# Patient Record
Sex: FEMALE | Race: WHITE | NOT HISPANIC OR LATINO
[De-identification: names, ages, dates, MRNs, and addresses within clinical notes are randomized per-mention and may not be internally consistent; named-entity substitution may affect disease eponyms.]

---

## 2017-04-01 ENCOUNTER — RX RENEWAL (OUTPATIENT)
Age: 27
End: 2017-04-01

## 2017-04-25 ENCOUNTER — RX RENEWAL (OUTPATIENT)
Age: 27
End: 2017-04-25

## 2017-05-22 ENCOUNTER — RX RENEWAL (OUTPATIENT)
Age: 27
End: 2017-05-22

## 2017-05-30 ENCOUNTER — APPOINTMENT (OUTPATIENT)
Dept: OBGYN | Facility: CLINIC | Age: 27
End: 2017-05-30

## 2017-05-30 VITALS
DIASTOLIC BLOOD PRESSURE: 62 MMHG | SYSTOLIC BLOOD PRESSURE: 114 MMHG | HEIGHT: 67 IN | WEIGHT: 267 LBS | BODY MASS INDEX: 41.91 KG/M2

## 2017-05-30 DIAGNOSIS — Z87.39 PERSONAL HISTORY OF OTHER DISEASES OF THE MUSCULOSKELETAL SYSTEM AND CONNECTIVE TISSUE: ICD-10-CM

## 2017-10-10 ENCOUNTER — RX RENEWAL (OUTPATIENT)
Age: 27
End: 2017-10-10

## 2017-12-02 ENCOUNTER — APPOINTMENT (OUTPATIENT)
Dept: OBGYN | Facility: CLINIC | Age: 27
End: 2017-12-02
Payer: MEDICAID

## 2017-12-02 ENCOUNTER — LABORATORY RESULT (OUTPATIENT)
Age: 27
End: 2017-12-02

## 2017-12-02 VITALS
DIASTOLIC BLOOD PRESSURE: 62 MMHG | SYSTOLIC BLOOD PRESSURE: 110 MMHG | BODY MASS INDEX: 43.95 KG/M2 | WEIGHT: 280 LBS | HEIGHT: 67 IN

## 2017-12-02 PROCEDURE — 99395 PREV VISIT EST AGE 18-39: CPT

## 2017-12-09 LAB — CYTOLOGY CVX/VAG DOC THIN PREP: NORMAL

## 2018-05-10 ENCOUNTER — EMERGENCY (EMERGENCY)
Facility: HOSPITAL | Age: 28
LOS: 1 days | Discharge: HOME | End: 2018-05-10
Attending: EMERGENCY MEDICINE
Payer: MEDICAID

## 2018-05-10 VITALS
RESPIRATION RATE: 18 BRPM | SYSTOLIC BLOOD PRESSURE: 133 MMHG | HEART RATE: 94 BPM | TEMPERATURE: 100 F | DIASTOLIC BLOOD PRESSURE: 83 MMHG | OXYGEN SATURATION: 100 %

## 2018-05-10 PROCEDURE — 99283 EMERGENCY DEPT VISIT LOW MDM: CPT

## 2018-05-10 PROCEDURE — 73630 X-RAY EXAM OF FOOT: CPT

## 2018-05-10 PROCEDURE — 73630 X-RAY EXAM OF FOOT: CPT | Mod: 26,RT

## 2018-05-10 RX ORDER — ACETAMINOPHEN 500 MG
975 TABLET ORAL ONCE
Qty: 0 | Refills: 0 | Status: COMPLETED | OUTPATIENT
Start: 2018-05-10 | End: 2018-05-10

## 2018-05-10 RX ORDER — IBUPROFEN 200 MG
600 TABLET ORAL ONCE
Qty: 0 | Refills: 0 | Status: DISCONTINUED | OUTPATIENT
Start: 2018-05-10 | End: 2018-05-10

## 2018-05-10 RX ADMIN — Medication 975 MILLIGRAM(S): at 21:40

## 2018-05-10 NOTE — ED ADULT NURSE NOTE - OBJECTIVE STATEMENT
28 Y female no PMH arrives to ED c/o ankle pain s/p trip and fall two hours prior. Patient reports she was chasing her toddler at her school, states she twisted her ankle during event. Reports she has pain when she dorsi flexes foot, no obvious deformities, bruising, trauma. Patient reports pain is 6/10 worse with movement.

## 2018-05-10 NOTE — ED PROVIDER NOTE - OBJECTIVE STATEMENT
29 y/o female no PMH presents with R foot injury. Was running on solid ground after her son on flat ground and inverted her R foot. Has pain over dorsal surface of her R foot, proximal metatarsals. No pain with ROM of ankle or knee. No f/c, CP, SOB, N/V/D, numbness, weakness. Took 600 motrin w/ mild relief. No allergies. Does take OCPs. 29 y/o female no PMH presents with R foot injury. Was running on solid ground after her son on flat ground and inverted her R foot. Has pain over dorsal surface of her R foot, proximal metatarsals. No pain with ROM of ankle or knee. No f/c, CP, SOB, N/V/D, numbness, weakness. Took 600 motrin w/ mild relief. No allergies. Does take OCPs.      Attending note. Patient was seen in fast track room #6. Agree with the above. Approximately 2 hours ago patient inverted her right ankle and is now complaining of pain in the midfoot. Pain is increased with weightbearing. Patient is any numbness or paresthesia. Patient has some pain in the left hand which has resolved. Patient denies any prior injury to the foot.

## 2018-05-10 NOTE — ED PROVIDER NOTE - PLAN OF CARE
1) Please return to the ED should you have any new or worsening symptoms, worsening pain, develop any concerning symptoms  2) Please follow up with [facility]. Please call [phone] to make an appointment.   3) Please take Ibuprofen (Motrin) 600mg by mouth every 6 hours as needed for pain. Please take this medication with food.

## 2018-05-10 NOTE — ED PROVIDER NOTE - PROGRESS NOTE DETAILS
Fuad Wolfe (Resident): no lisfranc fracture appreciated on XR - no other injuries identified - will wrap in Wagner dressing and d/c w/ podiatry f.u - nursing provided patient crutches and educated on how to use .

## 2018-05-10 NOTE — ED PROVIDER NOTE - MUSCULOSKELETAL, MLM
Strength appropriate for age. Full active range of motion of all 4 extremities. No pain with ROM of R knee or ankle. Tenderness over Lisfranc joint, dorsal surface of R foot. No bruising or open fracture. No tenderness over R lateral and medial malleolus. No pain with active ROM of R ankle. No tenderness over entire tib/fibula on the R. Remainder of extremities WNL.

## 2018-05-10 NOTE — ED PROVIDER NOTE - MEDICAL DECISION MAKING DETAILS
Fuad Wolfe (Resident): 29 y/o female p/w inversion injury to R foot - neurovasc intact, no knee pain or ankle pain w/ good ROM - based on exam, concern for poss Lisfranc vs metatarsal injury - will pain control and XR Fuad Wolfe (Resident): 27 y/o female p/w inversion injury to R foot - neurovasc intact, no knee pain or ankle pain w/ good ROM - based on exam, concern for poss Lisfranc vs metatarsal injury - will pain control and XR      Attending note-right foot injury. Patient has no ankle pain or tenderness. X-ray to rule out fracture. Likely sprain of Lisfranc joints.

## 2018-05-10 NOTE — ED PROVIDER NOTE - CARE PLAN
Principal Discharge DX:	Foot sprain, right, initial encounter Principal Discharge DX:	Foot sprain, right, initial encounter  Assessment and plan of treatment:	1) Please return to the ED should you have any new or worsening symptoms, worsening pain, develop any concerning symptoms  2) Please follow up with [facility]. Please call [phone] to make an appointment.   3) Please take Ibuprofen (Motrin) 600mg by mouth every 6 hours as needed for pain. Please take this medication with food.

## 2018-05-10 NOTE — ED PROVIDER NOTE - PHYSICAL EXAMINATION
Attending note. Patient is alert and in no acute distress. Examination of the right lower extremity reveals no deformity. Proximal leg is nontender. Squeeze test is negative. Malleoli are nontender. There is no tenderness deltoid, ATFL, CFL or Achilles. Patient has tenderness along the Lisfranc joint. There is no swelling of the foot with deformity of the foot. Sensation is intact and normal. Patient has good capillary refill. Skin is normal.

## 2018-06-25 ENCOUNTER — APPOINTMENT (OUTPATIENT)
Dept: OBGYN | Facility: CLINIC | Age: 28
End: 2018-06-25

## 2018-07-16 ENCOUNTER — RX RENEWAL (OUTPATIENT)
Age: 28
End: 2018-07-16

## 2018-07-30 ENCOUNTER — APPOINTMENT (OUTPATIENT)
Dept: OBGYN | Facility: CLINIC | Age: 28
End: 2018-07-30
Payer: MEDICAID

## 2018-07-30 VITALS
SYSTOLIC BLOOD PRESSURE: 102 MMHG | DIASTOLIC BLOOD PRESSURE: 80 MMHG | BODY MASS INDEX: 45.99 KG/M2 | HEIGHT: 67 IN | WEIGHT: 293 LBS

## 2018-07-30 PROCEDURE — 99213 OFFICE O/P EST LOW 20 MIN: CPT

## 2018-09-24 ENCOUNTER — EMERGENCY (EMERGENCY)
Facility: HOSPITAL | Age: 28
LOS: 1 days | Discharge: HOME | End: 2018-09-24
Attending: EMERGENCY MEDICINE
Payer: COMMERCIAL

## 2018-09-24 VITALS
HEIGHT: 70 IN | HEART RATE: 98 BPM | TEMPERATURE: 98 F | WEIGHT: 293 LBS | DIASTOLIC BLOOD PRESSURE: 86 MMHG | RESPIRATION RATE: 20 BRPM | SYSTOLIC BLOOD PRESSURE: 126 MMHG | OXYGEN SATURATION: 100 %

## 2018-09-24 LAB
ALBUMIN SERPL ELPH-MCNC: 4.3 G/DL — SIGNIFICANT CHANGE UP (ref 3.3–5)
ALP SERPL-CCNC: 60 U/L — SIGNIFICANT CHANGE UP (ref 40–120)
ALT FLD-CCNC: 16 U/L — SIGNIFICANT CHANGE UP (ref 10–45)
ANION GAP SERPL CALC-SCNC: 11 MMOL/L — SIGNIFICANT CHANGE UP (ref 5–17)
APPEARANCE UR: CLEAR — SIGNIFICANT CHANGE UP
APTT BLD: 30.6 SEC — SIGNIFICANT CHANGE UP (ref 27.5–37.4)
AST SERPL-CCNC: 14 U/L — SIGNIFICANT CHANGE UP (ref 10–40)
BACTERIA # UR AUTO: NEGATIVE — SIGNIFICANT CHANGE UP
BASOPHILS # BLD AUTO: 0 K/UL — SIGNIFICANT CHANGE UP (ref 0–0.2)
BASOPHILS NFR BLD AUTO: 0.3 % — SIGNIFICANT CHANGE UP (ref 0–2)
BILIRUB SERPL-MCNC: 0.2 MG/DL — SIGNIFICANT CHANGE UP (ref 0.2–1.2)
BILIRUB UR-MCNC: NEGATIVE — SIGNIFICANT CHANGE UP
BUN SERPL-MCNC: 12 MG/DL — SIGNIFICANT CHANGE UP (ref 7–23)
CALCIUM SERPL-MCNC: 9.7 MG/DL — SIGNIFICANT CHANGE UP (ref 8.4–10.5)
CHLORIDE SERPL-SCNC: 103 MMOL/L — SIGNIFICANT CHANGE UP (ref 96–108)
CO2 SERPL-SCNC: 27 MMOL/L — SIGNIFICANT CHANGE UP (ref 22–31)
COLOR SPEC: SIGNIFICANT CHANGE UP
CREAT SERPL-MCNC: 0.64 MG/DL — SIGNIFICANT CHANGE UP (ref 0.5–1.3)
DIFF PNL FLD: NEGATIVE — SIGNIFICANT CHANGE UP
EOSINOPHIL # BLD AUTO: 0.1 K/UL — SIGNIFICANT CHANGE UP (ref 0–0.5)
EOSINOPHIL NFR BLD AUTO: 1.3 % — SIGNIFICANT CHANGE UP (ref 0–6)
EPI CELLS # UR: 1 /HPF — SIGNIFICANT CHANGE UP
GAS PNL BLDV: SIGNIFICANT CHANGE UP
GLUCOSE SERPL-MCNC: 102 MG/DL — HIGH (ref 70–99)
GLUCOSE UR QL: NEGATIVE — SIGNIFICANT CHANGE UP
HCG SERPL-ACNC: <2 MIU/ML — SIGNIFICANT CHANGE UP
HCT VFR BLD CALC: 40.7 % — SIGNIFICANT CHANGE UP (ref 34.5–45)
HGB BLD-MCNC: 13.7 G/DL — SIGNIFICANT CHANGE UP (ref 11.5–15.5)
HYALINE CASTS # UR AUTO: 0 /LPF — SIGNIFICANT CHANGE UP (ref 0–2)
INR BLD: 0.95 RATIO — SIGNIFICANT CHANGE UP (ref 0.88–1.16)
KETONES UR-MCNC: NEGATIVE — SIGNIFICANT CHANGE UP
LEUKOCYTE ESTERASE UR-ACNC: NEGATIVE — SIGNIFICANT CHANGE UP
LIDOCAIN IGE QN: 31 U/L — SIGNIFICANT CHANGE UP (ref 7–60)
LYMPHOCYTES # BLD AUTO: 2.9 K/UL — SIGNIFICANT CHANGE UP (ref 1–3.3)
LYMPHOCYTES # BLD AUTO: 30 % — SIGNIFICANT CHANGE UP (ref 13–44)
MCHC RBC-ENTMCNC: 28.7 PG — SIGNIFICANT CHANGE UP (ref 27–34)
MCHC RBC-ENTMCNC: 33.7 GM/DL — SIGNIFICANT CHANGE UP (ref 32–36)
MCV RBC AUTO: 85.2 FL — SIGNIFICANT CHANGE UP (ref 80–100)
MONOCYTES # BLD AUTO: 0.6 K/UL — SIGNIFICANT CHANGE UP (ref 0–0.9)
MONOCYTES NFR BLD AUTO: 6.6 % — SIGNIFICANT CHANGE UP (ref 2–14)
NEUTROPHILS # BLD AUTO: 6 K/UL — SIGNIFICANT CHANGE UP (ref 1.8–7.4)
NEUTROPHILS NFR BLD AUTO: 61.8 % — SIGNIFICANT CHANGE UP (ref 43–77)
NITRITE UR-MCNC: NEGATIVE — SIGNIFICANT CHANGE UP
PH UR: 6 — SIGNIFICANT CHANGE UP (ref 5–8)
PLATELET # BLD AUTO: 257 K/UL — SIGNIFICANT CHANGE UP (ref 150–400)
POTASSIUM SERPL-MCNC: 3.9 MMOL/L — SIGNIFICANT CHANGE UP (ref 3.5–5.3)
POTASSIUM SERPL-SCNC: 3.9 MMOL/L — SIGNIFICANT CHANGE UP (ref 3.5–5.3)
PROT SERPL-MCNC: 7.3 G/DL — SIGNIFICANT CHANGE UP (ref 6–8.3)
PROT UR-MCNC: NEGATIVE — SIGNIFICANT CHANGE UP
PROTHROM AB SERPL-ACNC: 10.3 SEC — SIGNIFICANT CHANGE UP (ref 9.8–12.7)
RBC # BLD: 4.78 M/UL — SIGNIFICANT CHANGE UP (ref 3.8–5.2)
RBC # FLD: 12.2 % — SIGNIFICANT CHANGE UP (ref 10.3–14.5)
RBC CASTS # UR COMP ASSIST: 2 /HPF — SIGNIFICANT CHANGE UP (ref 0–4)
SODIUM SERPL-SCNC: 141 MMOL/L — SIGNIFICANT CHANGE UP (ref 135–145)
SP GR SPEC: 1.02 — SIGNIFICANT CHANGE UP
UROBILINOGEN FLD QL: NEGATIVE — SIGNIFICANT CHANGE UP
WBC # BLD: 9.7 K/UL — SIGNIFICANT CHANGE UP (ref 3.8–10.5)
WBC # FLD AUTO: 9.7 K/UL — SIGNIFICANT CHANGE UP (ref 3.8–10.5)
WBC UR QL: 1 /HPF — SIGNIFICANT CHANGE UP (ref 0–5)

## 2018-09-24 PROCEDURE — 76830 TRANSVAGINAL US NON-OB: CPT | Mod: 26

## 2018-09-24 PROCEDURE — 93975 VASCULAR STUDY: CPT

## 2018-09-24 PROCEDURE — 85730 THROMBOPLASTIN TIME PARTIAL: CPT

## 2018-09-24 PROCEDURE — 76830 TRANSVAGINAL US NON-OB: CPT

## 2018-09-24 PROCEDURE — 74177 CT ABD & PELVIS W/CONTRAST: CPT

## 2018-09-24 PROCEDURE — 83605 ASSAY OF LACTIC ACID: CPT

## 2018-09-24 PROCEDURE — 82803 BLOOD GASES ANY COMBINATION: CPT

## 2018-09-24 PROCEDURE — 82435 ASSAY OF BLOOD CHLORIDE: CPT

## 2018-09-24 PROCEDURE — 85027 COMPLETE CBC AUTOMATED: CPT

## 2018-09-24 PROCEDURE — 82330 ASSAY OF CALCIUM: CPT

## 2018-09-24 PROCEDURE — 85610 PROTHROMBIN TIME: CPT

## 2018-09-24 PROCEDURE — 80053 COMPREHEN METABOLIC PANEL: CPT

## 2018-09-24 PROCEDURE — 76705 ECHO EXAM OF ABDOMEN: CPT | Mod: 26,RT

## 2018-09-24 PROCEDURE — 83690 ASSAY OF LIPASE: CPT

## 2018-09-24 PROCEDURE — 99284 EMERGENCY DEPT VISIT MOD MDM: CPT

## 2018-09-24 PROCEDURE — 84702 CHORIONIC GONADOTROPIN TEST: CPT

## 2018-09-24 PROCEDURE — 93975 VASCULAR STUDY: CPT | Mod: 26

## 2018-09-24 PROCEDURE — 81001 URINALYSIS AUTO W/SCOPE: CPT

## 2018-09-24 PROCEDURE — 84295 ASSAY OF SERUM SODIUM: CPT

## 2018-09-24 PROCEDURE — 76705 ECHO EXAM OF ABDOMEN: CPT

## 2018-09-24 PROCEDURE — 84132 ASSAY OF SERUM POTASSIUM: CPT

## 2018-09-24 PROCEDURE — 87086 URINE CULTURE/COLONY COUNT: CPT

## 2018-09-24 PROCEDURE — 74177 CT ABD & PELVIS W/CONTRAST: CPT | Mod: 26

## 2018-09-24 PROCEDURE — 85014 HEMATOCRIT: CPT

## 2018-09-24 PROCEDURE — 82947 ASSAY GLUCOSE BLOOD QUANT: CPT

## 2018-09-24 PROCEDURE — 99284 EMERGENCY DEPT VISIT MOD MDM: CPT | Mod: 25

## 2018-09-24 PROCEDURE — 96374 THER/PROPH/DIAG INJ IV PUSH: CPT | Mod: XU

## 2018-09-24 RX ORDER — SODIUM CHLORIDE 9 MG/ML
2000 INJECTION, SOLUTION INTRAVENOUS ONCE
Qty: 0 | Refills: 0 | Status: COMPLETED | OUTPATIENT
Start: 2018-09-24 | End: 2018-09-24

## 2018-09-24 RX ORDER — ONDANSETRON 8 MG/1
4 TABLET, FILM COATED ORAL ONCE
Qty: 0 | Refills: 0 | Status: COMPLETED | OUTPATIENT
Start: 2018-09-24 | End: 2018-09-24

## 2018-09-24 RX ADMIN — SODIUM CHLORIDE 2000 MILLILITER(S): 9 INJECTION, SOLUTION INTRAVENOUS at 20:18

## 2018-09-24 RX ADMIN — ONDANSETRON 4 MILLIGRAM(S): 8 TABLET, FILM COATED ORAL at 20:17

## 2018-09-24 NOTE — ED PROVIDER NOTE - ATTENDING CONTRIBUTION TO CARE
I have seen and evaluated this patient with the [place] practice clinician.   I agree with the findings  unless other wise stated. I have amended notes where needed.  After my face to face bedside evaluation, I am noting: Prt with urinary frequency and lower abdominal pain transient blood in urine family h/o renal colic no fever h/o vomiting o/e obese no cva tenderness no abdominal masses discomfort epi and RUQ concern for UTI or renal colic CT abdomen RUQ US  all reported wnl No urine pyuria will have outpatient f/u   --engel

## 2018-09-24 NOTE — ED PROVIDER NOTE - OBJECTIVE STATEMENT
27 y/o obese female with no PMHx presented to the ED c/o back pain and urinary frequency x3 days. Patient stated 6 days ago had dysuria, urinary freq and hematuria. was prescribed cipro 500mg BIDx 5 days by urgent care for cystitis however pt stated Urgent Care called today with negative urine culture. dysuria/hematuria resolved however continues to have urinary frequency. Patient now c/o bilateral midthoracic pain L>R. Patient stated right sided back pain radiates to the groin. Has nausea and vomiting with associated chills but no fevers. Vomited 6 times within last 24 hours. LMP 2 days ago. Last BM today and has flatus. Similar episode occurred in the in 2016 was had negative RUQ ultrasound. Fam h/o gallstones. Patient denied CP, SOB, weakness, dizziness, headache, sick contacts, URI symptoms, current dysuria and hematuria, diarrhea

## 2018-09-24 NOTE — ED ADULT NURSE NOTE - OBJECTIVE STATEMENT
29 y/o F patient presents to ED from home c/o b/l flank pain and back pain today. Patient states she was recently seen at urgent care and was told she had cystitis. Patient reports she was prescribed ciprofloxacin. Patient endorses N/V. As per patient she had 6 episodes of vomiting in the last 24 hours. As per patient, LMP=2 days ago. Patient A&Ox3. lungs CTA. skin warm and intact. abdominal tenderness noted in right and left quadrants. ambulatory. Patient denies HA, dizziness, SOB, chest pain, bowel/bladder changes, fevers/chills. VSS. Safety and comfort ernestina 29 y/o F patient presents to ED from home c/o b/l flank pain and back pain today. Patient states she was recently seen at urgent care and was told she had cystitis. Patient reports she was prescribed ciprofloxacin. Patient endorses N/V. As per patient she had 6 episodes of vomiting in the last 24 hours. As per patient, LMP=2 days ago. Patient A&Ox3. lungs CTA. skin warm and intact. abdominal tenderness noted in right and left quadrants. ambulatory. Patient denies HA, dizziness, SOB, chest pain, bowel/bladder changes, fevers/chills. VSS. Safety and comfort measures provided and maintained. Family bedside. MD bedside.

## 2018-09-24 NOTE — ED ADULT NURSE NOTE - NSIMPLEMENTINTERV_GEN_ALL_ED
Implemented All Universal Safety Interventions:  [place] to call system. Call bell, personal items and telephone within reach. Instruct patient to call for assistance. Room bathroom lighting operational. Non-slip footwear when patient is off stretcher. Physically safe environment: no spills, clutter or unnecessary equipment. Stretcher in lowest position, wheels locked, appropriate side rails in place.

## 2018-09-24 NOTE — ED ADULT TRIAGE NOTE - CHIEF COMPLAINT QUOTE
flank pain radiating to front had blood visualized in urine last Thurs went to urgi care Fri started on antibiotics but pain continues despite cipro

## 2018-09-24 NOTE — ED PROVIDER NOTE - MEDICAL DECISION MAKING DETAILS
Prt with urinary frequency and lower abdominal pain transient blood in urine family h/o renal colic no fever h/o vomiting o/e obese no cva tenderness no abdominal masses discomfort epi and RUQ concern for UTI or renal colic CT abdomen RUQ US  re evaluate --engel

## 2018-09-25 VITALS
SYSTOLIC BLOOD PRESSURE: 120 MMHG | RESPIRATION RATE: 18 BRPM | TEMPERATURE: 98 F | OXYGEN SATURATION: 100 % | DIASTOLIC BLOOD PRESSURE: 81 MMHG | HEART RATE: 74 BPM

## 2018-09-25 LAB
CULTURE RESULTS: SIGNIFICANT CHANGE UP
SPECIMEN SOURCE: SIGNIFICANT CHANGE UP

## 2018-12-01 ENCOUNTER — EMERGENCY (EMERGENCY)
Facility: HOSPITAL | Age: 28
LOS: 1 days | Discharge: HOME | End: 2018-12-01
Attending: EMERGENCY MEDICINE
Payer: COMMERCIAL

## 2018-12-01 VITALS
SYSTOLIC BLOOD PRESSURE: 136 MMHG | WEIGHT: 289.91 LBS | HEART RATE: 98 BPM | TEMPERATURE: 99 F | HEIGHT: 70 IN | DIASTOLIC BLOOD PRESSURE: 80 MMHG | RESPIRATION RATE: 20 BRPM

## 2018-12-01 VITALS
RESPIRATION RATE: 16 BRPM | TEMPERATURE: 99 F | DIASTOLIC BLOOD PRESSURE: 60 MMHG | OXYGEN SATURATION: 100 % | HEART RATE: 91 BPM | SYSTOLIC BLOOD PRESSURE: 120 MMHG

## 2018-12-01 LAB
ALBUMIN SERPL ELPH-MCNC: 4.2 G/DL — SIGNIFICANT CHANGE UP (ref 3.3–5)
ALP SERPL-CCNC: 62 U/L — SIGNIFICANT CHANGE UP (ref 40–120)
ALT FLD-CCNC: 18 U/L — SIGNIFICANT CHANGE UP (ref 10–45)
ANION GAP SERPL CALC-SCNC: 18 MMOL/L — HIGH (ref 5–17)
AST SERPL-CCNC: 14 U/L — SIGNIFICANT CHANGE UP (ref 10–40)
BASOPHILS # BLD AUTO: 0 K/UL — SIGNIFICANT CHANGE UP (ref 0–0.2)
BASOPHILS NFR BLD AUTO: 0.4 % — SIGNIFICANT CHANGE UP (ref 0–2)
BILIRUB SERPL-MCNC: 0.2 MG/DL — SIGNIFICANT CHANGE UP (ref 0.2–1.2)
BUN SERPL-MCNC: 12 MG/DL — SIGNIFICANT CHANGE UP (ref 7–23)
CALCIUM SERPL-MCNC: 9.6 MG/DL — SIGNIFICANT CHANGE UP (ref 8.4–10.5)
CHLORIDE SERPL-SCNC: 102 MMOL/L — SIGNIFICANT CHANGE UP (ref 96–108)
CO2 SERPL-SCNC: 20 MMOL/L — LOW (ref 22–31)
CREAT SERPL-MCNC: 0.44 MG/DL — LOW (ref 0.5–1.3)
D DIMER BLD IA.RAPID-MCNC: 186 NG/ML DDU — SIGNIFICANT CHANGE UP
EOSINOPHIL # BLD AUTO: 0.1 K/UL — SIGNIFICANT CHANGE UP (ref 0–0.5)
EOSINOPHIL NFR BLD AUTO: 1.3 % — SIGNIFICANT CHANGE UP (ref 0–6)
GLUCOSE SERPL-MCNC: 106 MG/DL — HIGH (ref 70–99)
HCT VFR BLD CALC: 39.9 % — SIGNIFICANT CHANGE UP (ref 34.5–45)
HGB BLD-MCNC: 13.6 G/DL — SIGNIFICANT CHANGE UP (ref 11.5–15.5)
LYMPHOCYTES # BLD AUTO: 1.9 K/UL — SIGNIFICANT CHANGE UP (ref 1–3.3)
LYMPHOCYTES # BLD AUTO: 19.3 % — SIGNIFICANT CHANGE UP (ref 13–44)
MCHC RBC-ENTMCNC: 28.9 PG — SIGNIFICANT CHANGE UP (ref 27–34)
MCHC RBC-ENTMCNC: 34 GM/DL — SIGNIFICANT CHANGE UP (ref 32–36)
MCV RBC AUTO: 85 FL — SIGNIFICANT CHANGE UP (ref 80–100)
MONOCYTES # BLD AUTO: 0.5 K/UL — SIGNIFICANT CHANGE UP (ref 0–0.9)
MONOCYTES NFR BLD AUTO: 4.9 % — SIGNIFICANT CHANGE UP (ref 2–14)
NEUTROPHILS # BLD AUTO: 7.1 K/UL — SIGNIFICANT CHANGE UP (ref 1.8–7.4)
NEUTROPHILS NFR BLD AUTO: 74.1 % — SIGNIFICANT CHANGE UP (ref 43–77)
PLATELET # BLD AUTO: 280 K/UL — SIGNIFICANT CHANGE UP (ref 150–400)
POTASSIUM SERPL-MCNC: 3.8 MMOL/L — SIGNIFICANT CHANGE UP (ref 3.5–5.3)
POTASSIUM SERPL-SCNC: 3.8 MMOL/L — SIGNIFICANT CHANGE UP (ref 3.5–5.3)
PROT SERPL-MCNC: 7.2 G/DL — SIGNIFICANT CHANGE UP (ref 6–8.3)
RBC # BLD: 4.7 M/UL — SIGNIFICANT CHANGE UP (ref 3.8–5.2)
RBC # FLD: 12.5 % — SIGNIFICANT CHANGE UP (ref 10.3–14.5)
SODIUM SERPL-SCNC: 140 MMOL/L — SIGNIFICANT CHANGE UP (ref 135–145)
TROPONIN T, HIGH SENSITIVITY RESULT: <6 NG/L — SIGNIFICANT CHANGE UP (ref 0–51)
WBC # BLD: 9.6 K/UL — SIGNIFICANT CHANGE UP (ref 3.8–10.5)
WBC # FLD AUTO: 9.6 K/UL — SIGNIFICANT CHANGE UP (ref 3.8–10.5)

## 2018-12-01 PROCEDURE — 93308 TTE F-UP OR LMTD: CPT | Mod: 26

## 2018-12-01 PROCEDURE — 85379 FIBRIN DEGRADATION QUANT: CPT

## 2018-12-01 PROCEDURE — 80053 COMPREHEN METABOLIC PANEL: CPT

## 2018-12-01 PROCEDURE — 84484 ASSAY OF TROPONIN QUANT: CPT

## 2018-12-01 PROCEDURE — 99285 EMERGENCY DEPT VISIT HI MDM: CPT | Mod: 25

## 2018-12-01 PROCEDURE — 71046 X-RAY EXAM CHEST 2 VIEWS: CPT | Mod: 26

## 2018-12-01 PROCEDURE — 99284 EMERGENCY DEPT VISIT MOD MDM: CPT | Mod: 25

## 2018-12-01 PROCEDURE — 71046 X-RAY EXAM CHEST 2 VIEWS: CPT

## 2018-12-01 PROCEDURE — 93005 ELECTROCARDIOGRAM TRACING: CPT

## 2018-12-01 PROCEDURE — 85027 COMPLETE CBC AUTOMATED: CPT

## 2018-12-01 PROCEDURE — 93010 ELECTROCARDIOGRAM REPORT: CPT

## 2018-12-01 PROCEDURE — 93308 TTE F-UP OR LMTD: CPT

## 2018-12-01 RX ORDER — SODIUM CHLORIDE 9 MG/ML
1000 INJECTION, SOLUTION INTRAVENOUS ONCE
Qty: 0 | Refills: 0 | Status: COMPLETED | OUTPATIENT
Start: 2018-12-01 | End: 2018-12-01

## 2018-12-01 RX ADMIN — SODIUM CHLORIDE 2000 MILLILITER(S): 9 INJECTION, SOLUTION INTRAVENOUS at 19:01

## 2018-12-01 NOTE — ED PROVIDER NOTE - PROGRESS NOTE DETAILS
Elizabeth Goldberger PGY-2: dimer neg, cxr unrem. Pt feeling better Attending Viktoria Hester: pt feeling well. urine preganncy negative. d/w pt importance of following up with a cardiologist, consider possible need for holter. plan to d/c

## 2018-12-01 NOTE — ED PROVIDER NOTE - PLAN OF CARE
You were seen in the emergency department for chest pain. Please follow up with your primary doctor in the next 2-3 days. Return to the emergency department immediately if you experience difficulty breathing, worsening chest pain or any other concerning symptoms.

## 2018-12-01 NOTE — ED ADULT NURSE REASSESSMENT NOTE - NS ED NURSE REASSESS COMMENT FT1
Received report from ED NISREEN Cadet; patient reassessed- A&Ox3, has intermittent neck pain but does not need pain medication at this time. 20 g IV in L forearm patent and site WNL. Patient getting fluids and hen pending dispo.

## 2018-12-01 NOTE — ED PROVIDER NOTE - MEDICAL DECISION MAKING DETAILS
28f here for cp, lightheadedness. w hx recent URI sx. Tachycardic on exam (though not in triage ekg) and tachypneic w/o distress, clear lungs. Low-mod susp for PE given on OCPs and current vitals, vs possible pna. Labs incl Dimer, cxr, reassess 28f here for cp, lightheadedness. w hx recent URI sx. Tachycardic on exam (though not in triage ekg) and tachypneic w/o distress, clear lungs. Low-mod susp for PE given on OCPs and current vitals, vs. possible pna. Labs incl Dimer, cxr, reassess 28f here for cp, lightheadedness. w hx recent URI sx. Tachycardic on exam (though not in triage ekg) and tachypneic w/o distress, clear lungs. Low-mod susp for PE given on OCPs and current vitals, vs. possible pna. Labs incl Dimer, cxr, reassess  Attending Viktoria Hester: 27 y/o female on OCP presenting with chest pain, fatigue and mild sob. upon arrival pt with . no evidence of respiratory distress. pt is on abx for possible uri symptoms. lungs ctab. d dimer sent as pt on OCP with some mild sob which was negative making PE unlikely. pt does have family history of cardiac disease. pocus shows no sig pceff and troponin negative ischemia less likely. consider possible arrythmia. pt on tele. well appearing. will likely d/c with cards follow up and close return precautions. no abdominal pain

## 2018-12-01 NOTE — ED ADULT TRIAGE NOTE - CHIEF COMPLAINT QUOTE
left sided chest pain, intermittent radiating to the left side of neck starting at work at 1330 yesterday that lasted for 15 minutes. describes pain as aching with palpitations. patient c/o last episode cp 45 minutes ago. nausea, HA, and numbness to the mouth.

## 2018-12-01 NOTE — ED ADULT TRIAGE NOTE - WEIGHT IN LBS
mark to call the office reg 's response below and also sent the pt a msg thru Judith Wilde.   [phone]

## 2018-12-01 NOTE — ED PROVIDER NOTE - CARE PLAN
Assessment and plan of treatment:	You were seen in the emergency department for chest pain. Please follow up with your primary doctor in the next 2-3 days. Return to the emergency department immediately if you experience difficulty breathing, worsening chest pain or any other concerning symptoms. Principal Discharge DX:	Palpitation  Assessment and plan of treatment:	You were seen in the emergency department for chest pain. Please follow up with your primary doctor in the next 2-3 days. Return to the emergency department immediately if you experience difficulty breathing, worsening chest pain or any other concerning symptoms.

## 2018-12-01 NOTE — ED ADULT NURSE NOTE - NSIMPLEMENTINTERV_GEN_ALL_ED
Implemented All Universal Safety Interventions:  [place] to call system. Call bell, personal items and telephone within reach. Instruct patient to call for assistance. Room bathroom lighting operational. Non-slip footwear when patient is off stretcher. Physically safe environment: no spills, clutter or unnecessary equipment. Stretcher in lowest position, wheels locked, appropriate side rails in place.

## 2018-12-01 NOTE — ED PROVIDER NOTE - ATTENDING CONTRIBUTION TO CARE
Attending MD Viktoria Hester:  I personally have seen and examined this patient.  Resident note reviewed and agree on plan of care and except where noted.  See HPI, PE, and MDM for details.

## 2018-12-01 NOTE — ED ADULT NURSE NOTE - OBJECTIVE STATEMENT
27 y/o female denies PMH presents to ED reporting chest intermittent chest pain, beginning today. Pt is non-exertional, dull in nature and non-radiating. Pt reports having a h/a, nausea and neck pain for about one week. Pt reports oral contraceptive use and taking Amoxicillin for strep throat. On exam, AOx3, speaking in full & complete sentences. Lung sounds CTA, NAD, O2 sat 100%. Pt denies recent travel, sedentary lifestyle, smoking & recent surgery. Pt denies any warmth or tenderness of lower extremities. Lower extremities appear equal in size, no warmth, +2 peripheral pulses & capillary refill less than 2 seconds. Abdomen soft, non-tender, non-distended, normoactive bowel sounds in all 4 quadrants. Pt declines N/V/D, SOB, CP, fevers and chills at this time. Heplock placed, labs collected & sent to lab. Seen and evaluated by MD. EKG done. Place on CM, NSR HR 62.  at bedside.

## 2018-12-01 NOTE — ED PROVIDER NOTE - PHYSICAL EXAMINATION
Attending Viktoria Hester: Gen: NAD, heent: atrauamtic, eomi, perrla, mmm, op pink, uvula midline, neck; nttp, no nuchal rigidity, chest: nttp, no crepitus, cv: rrr, no murmurs, lungs: ctab, abd: soft, nontender, nondistended, no peritoneal signs, +BS, no guarding, ext: wwp, neg homans, skin: no rash, neuro: awake and alert, following commands, speech clear, sensation and strength intact, no focal deficits

## 2018-12-01 NOTE — ED PROVIDER NOTE - OBJECTIVE STATEMENT
28f no medical hx here c/o cp. 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 Also w SOB though pt states always feels SOB. Sx improved since then but recurred again today w worse pain. Also w left upper back pain. Pt takes OCPs, but no recent travel, no leg pain/swelling, no hx VTE. Also mentions sore throat w cough and nasal congestion 3 days ago for which was started on amox but has not taken consistently. Had fever 2 days ago none since.

## 2019-03-23 ENCOUNTER — EMERGENCY (EMERGENCY)
Facility: HOSPITAL | Age: 29
LOS: 1 days | Discharge: HOME | End: 2019-03-23
Attending: STUDENT IN AN ORGANIZED HEALTH CARE EDUCATION/TRAINING PROGRAM
Payer: SELF-PAY

## 2019-03-23 VITALS
HEIGHT: 70 IN | DIASTOLIC BLOOD PRESSURE: 78 MMHG | SYSTOLIC BLOOD PRESSURE: 113 MMHG | WEIGHT: 293 LBS | RESPIRATION RATE: 20 BRPM | HEART RATE: 98 BPM | OXYGEN SATURATION: 98 % | TEMPERATURE: 98 F

## 2019-03-23 PROCEDURE — 93010 ELECTROCARDIOGRAM REPORT: CPT

## 2019-03-23 PROCEDURE — 99284 EMERGENCY DEPT VISIT MOD MDM: CPT | Mod: 25

## 2019-03-23 PROCEDURE — 71046 X-RAY EXAM CHEST 2 VIEWS: CPT | Mod: 26

## 2019-03-23 NOTE — ED STATDOCS - OBJECTIVE STATEMENT
28 y/o female with no PMHx/no PSx presented to the ED c/o intermittent left sided chest x1 week. Patient described pain to be a heaviness or occasionally stabbing, SOB and associated palpitations. Patient has associated numbness to the LUE. Patient was evaluated by Urgent Care 4 days ago with negative EKG and asymptomatic bacteruria prescribed cipro. Patient had similar symptoms in 12/2018 was recommended cardiology eval but patient did not follow up. Patient also has URI symptoms as well. Patient denied OCP use, prolonged sedentary period, N/V/D, fever, chills, abdominal pain

## 2019-03-24 ENCOUNTER — TRANSCRIPTION ENCOUNTER (OUTPATIENT)
Age: 29
End: 2019-03-24

## 2019-03-24 VITALS
TEMPERATURE: 98 F | RESPIRATION RATE: 18 BRPM | DIASTOLIC BLOOD PRESSURE: 70 MMHG | SYSTOLIC BLOOD PRESSURE: 128 MMHG | HEART RATE: 81 BPM | OXYGEN SATURATION: 99 %

## 2019-03-24 LAB
ALBUMIN SERPL ELPH-MCNC: 4.2 G/DL — SIGNIFICANT CHANGE UP (ref 3.3–5)
ALP SERPL-CCNC: 68 U/L — SIGNIFICANT CHANGE UP (ref 40–120)
ALT FLD-CCNC: 24 U/L — SIGNIFICANT CHANGE UP (ref 10–45)
ANION GAP SERPL CALC-SCNC: 14 MMOL/L — SIGNIFICANT CHANGE UP (ref 5–17)
AST SERPL-CCNC: 17 U/L — SIGNIFICANT CHANGE UP (ref 10–40)
BASOPHILS # BLD AUTO: 0.1 K/UL — SIGNIFICANT CHANGE UP (ref 0–0.2)
BASOPHILS NFR BLD AUTO: 0.7 % — SIGNIFICANT CHANGE UP (ref 0–2)
BILIRUB SERPL-MCNC: 0.5 MG/DL — SIGNIFICANT CHANGE UP (ref 0.2–1.2)
BUN SERPL-MCNC: 8 MG/DL — SIGNIFICANT CHANGE UP (ref 7–23)
CALCIUM SERPL-MCNC: 9.7 MG/DL — SIGNIFICANT CHANGE UP (ref 8.4–10.5)
CHLORIDE SERPL-SCNC: 103 MMOL/L — SIGNIFICANT CHANGE UP (ref 96–108)
CO2 SERPL-SCNC: 23 MMOL/L — SIGNIFICANT CHANGE UP (ref 22–31)
CREAT SERPL-MCNC: 0.45 MG/DL — LOW (ref 0.5–1.3)
EOSINOPHIL # BLD AUTO: 0 K/UL — SIGNIFICANT CHANGE UP (ref 0–0.5)
EOSINOPHIL NFR BLD AUTO: 0.6 % — SIGNIFICANT CHANGE UP (ref 0–6)
GLUCOSE SERPL-MCNC: 106 MG/DL — HIGH (ref 70–99)
HCT VFR BLD CALC: 39.6 % — SIGNIFICANT CHANGE UP (ref 34.5–45)
HGB BLD-MCNC: 13.4 G/DL — SIGNIFICANT CHANGE UP (ref 11.5–15.5)
LIDOCAIN IGE QN: 25 U/L — SIGNIFICANT CHANGE UP (ref 7–60)
LYMPHOCYTES # BLD AUTO: 2.4 K/UL — SIGNIFICANT CHANGE UP (ref 1–3.3)
LYMPHOCYTES # BLD AUTO: 28.6 % — SIGNIFICANT CHANGE UP (ref 13–44)
MAGNESIUM SERPL-MCNC: 1.9 MG/DL — SIGNIFICANT CHANGE UP (ref 1.6–2.6)
MCHC RBC-ENTMCNC: 28.9 PG — SIGNIFICANT CHANGE UP (ref 27–34)
MCHC RBC-ENTMCNC: 33.8 GM/DL — SIGNIFICANT CHANGE UP (ref 32–36)
MCV RBC AUTO: 85.6 FL — SIGNIFICANT CHANGE UP (ref 80–100)
MONOCYTES # BLD AUTO: 0.7 K/UL — SIGNIFICANT CHANGE UP (ref 0–0.9)
MONOCYTES NFR BLD AUTO: 8.7 % — SIGNIFICANT CHANGE UP (ref 2–14)
NEUTROPHILS # BLD AUTO: 5.2 K/UL — SIGNIFICANT CHANGE UP (ref 1.8–7.4)
NEUTROPHILS NFR BLD AUTO: 61.4 % — SIGNIFICANT CHANGE UP (ref 43–77)
PHOSPHATE SERPL-MCNC: 3.6 MG/DL — SIGNIFICANT CHANGE UP (ref 2.5–4.5)
PLATELET # BLD AUTO: 252 K/UL — SIGNIFICANT CHANGE UP (ref 150–400)
POTASSIUM SERPL-MCNC: 3.7 MMOL/L — SIGNIFICANT CHANGE UP (ref 3.5–5.3)
POTASSIUM SERPL-SCNC: 3.7 MMOL/L — SIGNIFICANT CHANGE UP (ref 3.5–5.3)
PROT SERPL-MCNC: 6.9 G/DL — SIGNIFICANT CHANGE UP (ref 6–8.3)
RBC # BLD: 4.62 M/UL — SIGNIFICANT CHANGE UP (ref 3.8–5.2)
RBC # FLD: 12.5 % — SIGNIFICANT CHANGE UP (ref 10.3–14.5)
SODIUM SERPL-SCNC: 140 MMOL/L — SIGNIFICANT CHANGE UP (ref 135–145)
TSH SERPL-MCNC: 2.56 UIU/ML — SIGNIFICANT CHANGE UP (ref 0.27–4.2)
WBC # BLD: 8.5 K/UL — SIGNIFICANT CHANGE UP (ref 3.8–10.5)
WBC # FLD AUTO: 8.5 K/UL — SIGNIFICANT CHANGE UP (ref 3.8–10.5)

## 2019-03-24 PROCEDURE — 85027 COMPLETE CBC AUTOMATED: CPT

## 2019-03-24 PROCEDURE — 99283 EMERGENCY DEPT VISIT LOW MDM: CPT | Mod: 25

## 2019-03-24 PROCEDURE — 84100 ASSAY OF PHOSPHORUS: CPT

## 2019-03-24 PROCEDURE — 80053 COMPREHEN METABOLIC PANEL: CPT

## 2019-03-24 PROCEDURE — 84443 ASSAY THYROID STIM HORMONE: CPT

## 2019-03-24 PROCEDURE — 83690 ASSAY OF LIPASE: CPT

## 2019-03-24 PROCEDURE — 93005 ELECTROCARDIOGRAM TRACING: CPT

## 2019-03-24 PROCEDURE — 71046 X-RAY EXAM CHEST 2 VIEWS: CPT

## 2019-03-24 PROCEDURE — 83735 ASSAY OF MAGNESIUM: CPT

## 2019-03-24 RX ORDER — FAMOTIDINE 10 MG/ML
20 INJECTION INTRAVENOUS DAILY
Qty: 0 | Refills: 0 | Status: DISCONTINUED | OUTPATIENT
Start: 2019-03-24 | End: 2019-03-27

## 2019-03-24 RX ADMIN — Medication 30 MILLILITER(S): at 03:40

## 2019-03-24 RX ADMIN — FAMOTIDINE 20 MILLIGRAM(S): 10 INJECTION INTRAVENOUS at 03:40

## 2019-03-24 NOTE — ED PROVIDER NOTE - OBJECTIVE STATEMENT
29 yoF, otherwise healthy complaining of 1 week of L sided chest pain, palpitations, nausea, lightheadedness. Sx intermittent worse at night. Treated for UTI with cipro after being seen ar urgent care but stopped after 3 days. No urinary sx. Also complaining of rash, throat tightness and LUE numbness. Mild cough. No diarrhea or vomiting. LNMP was 3/10, takes oral contraceptives. No hx of VTE or recent travel. Does not weight gain, hair loss, and dry skin over last year. Thinks she had an abnormal TSH in the past but no follow up. Had workup here in ED for CP in Dec. Normal ekg, cxr, labs, neg dimer.

## 2019-03-24 NOTE — ED PROVIDER NOTE - PROGRESS NOTE DETAILS
Haverty PGY1- work up neg, pt feeling better and reassured, stable for d/c, will give nathaly f/u or pt can see parents PCP [place] PGY1- work up neg, pt feeling better and reassured, stable for d/c, will give nathaly f/u or pt can see parents PCP  Mikael Mckeon DO: agree with above. return precautions discussed.

## 2019-03-24 NOTE — ED PROVIDER NOTE - NSFOLLOWUPCLINICS_GEN_ALL_ED_FT
[facility] General Internal Medicine  General Internal Medicine  [address] [id number]  Phone: [phone]  Fax:   Follow Up Time:

## 2019-03-24 NOTE — ED PROVIDER NOTE - PHYSICAL EXAMINATION
PHYSICAL EXAM:  GENERAL: NAD, well-groomed, well-developed  HEAD:  Atraumatic, Normocephalic  EYES: EOMI, PERRLA, conjunctiva and sclera clear  ENMT: No tonsillar erythema, exudates, or enlargement; Moist mucous membranes, airway patent  NECK: Supple, No JVD, Normal thyroid  HEART: Regular rate and rhythm; No murmurs, rubs, or gallops  RESPIRATORY: CTA B/L, No W/R/R, no chest wall tenderness, no rash, no crepitus  ABDOMEN: Soft, Nontender, Nondistended;  BACK: no cva or midline tenderness  NEURO: A&Ox3, nonfocal, moving all extremities  EXTREMITIES:  2+ Peripheral Pulses, No clubbing, cyanosis, or edema  SKIN: warm, dry, normal color, no rash

## 2019-03-24 NOTE — ED PROVIDER NOTE - CLINICAL SUMMARY MEDICAL DECISION MAKING FREE TEXT BOX
Mino PGY1- non speficic sx for 1 week, seen by urgent care this week, significant w/u in ED in dec. 2018, no red flags, normal exam, tsh, lytes, thyroid, ekg, cxr, likely d/c with PCP f/u Mino PGY1- non speficic sx for 1 week, seen by urgent care this week, significant w/u in ED in dec. 2018, no red flags, normal exam, tsh, lytes, thyroid, ekg, cxr, likely d/c with PCP f/u  Mikael Mckeon DO: 28 yo F pmh with cp, palpitations, n/v. exam as above. low suspicion for acs, vte. plan: symptom relief, labs, cxr, ekg, reassess

## 2019-03-24 NOTE — ED ADULT NURSE NOTE - OBJECTIVE STATEMENT
Pt presents to ED with chest pain, AXOX3, mother at bedside, reports 1 week of left sided chest pain, radiating Pt presents to ED with chest pain, AXOX3, mother at bedside, reports 1 week of left sided chest pain with palpitations, radiating to left arm and neck, pt was seen at urgent care for symptoms and dx with UTI, pt took 3 days of cipro and and stopped abx, pt reports nausea, lightheadedness, reports cough, no vomiting or diarrhea reported, breathing unlabored, symmetrical, no shortness of breath, reports chills no fevers, no dysuria or hematuria.

## 2019-03-24 NOTE — ED PROVIDER NOTE - NSFOLLOWUPINSTRUCTIONS_ED_ALL_ED_FT
You workup here was normal. I have printed the results for you.     You can take MOTRIN for pain or the PEPCID which we gave you here if you feel it helped.    Please follow up with your primary doctor or be seen at [facility].     Return to ER for new or concerning symptoms.

## 2019-05-20 ENCOUNTER — EMERGENCY (EMERGENCY)
Facility: HOSPITAL | Age: 29
LOS: 1 days | Discharge: HOME | End: 2019-05-20
Attending: EMERGENCY MEDICINE | Admitting: EMERGENCY MEDICINE
Payer: MEDICAID

## 2019-05-20 VITALS
OXYGEN SATURATION: 100 % | SYSTOLIC BLOOD PRESSURE: 125 MMHG | HEART RATE: 91 BPM | TEMPERATURE: 98 F | RESPIRATION RATE: 17 BRPM | DIASTOLIC BLOOD PRESSURE: 62 MMHG

## 2019-05-20 LAB
ALBUMIN SERPL ELPH-MCNC: 4.6 G/DL — SIGNIFICANT CHANGE UP (ref 3.3–5)
ALP SERPL-CCNC: 61 U/L — SIGNIFICANT CHANGE UP (ref 40–120)
ALT FLD-CCNC: 24 U/L — SIGNIFICANT CHANGE UP (ref 4–33)
ANION GAP SERPL CALC-SCNC: 14 MMO/L — SIGNIFICANT CHANGE UP (ref 7–14)
APPEARANCE UR: CLEAR — SIGNIFICANT CHANGE UP
AST SERPL-CCNC: 18 U/L — SIGNIFICANT CHANGE UP (ref 4–32)
BASOPHILS # BLD AUTO: 0.06 K/UL — SIGNIFICANT CHANGE UP (ref 0–0.2)
BASOPHILS NFR BLD AUTO: 0.7 % — SIGNIFICANT CHANGE UP (ref 0–2)
BILIRUB SERPL-MCNC: 0.3 MG/DL — SIGNIFICANT CHANGE UP (ref 0.2–1.2)
BILIRUB UR-MCNC: NEGATIVE — SIGNIFICANT CHANGE UP
BLOOD UR QL VISUAL: NEGATIVE — SIGNIFICANT CHANGE UP
BUN SERPL-MCNC: 8 MG/DL — SIGNIFICANT CHANGE UP (ref 7–23)
CALCIUM SERPL-MCNC: 10.3 MG/DL — SIGNIFICANT CHANGE UP (ref 8.4–10.5)
CHLORIDE SERPL-SCNC: 103 MMOL/L — SIGNIFICANT CHANGE UP (ref 98–107)
CO2 SERPL-SCNC: 23 MMOL/L — SIGNIFICANT CHANGE UP (ref 22–31)
COLOR SPEC: SIGNIFICANT CHANGE UP
CREAT SERPL-MCNC: 0.47 MG/DL — LOW (ref 0.5–1.3)
EOSINOPHIL # BLD AUTO: 0.05 K/UL — SIGNIFICANT CHANGE UP (ref 0–0.5)
EOSINOPHIL NFR BLD AUTO: 0.5 % — SIGNIFICANT CHANGE UP (ref 0–6)
GLUCOSE SERPL-MCNC: 97 MG/DL — SIGNIFICANT CHANGE UP (ref 70–99)
GLUCOSE UR-MCNC: NEGATIVE — SIGNIFICANT CHANGE UP
HBA1C BLD-MCNC: 4.9 % — SIGNIFICANT CHANGE UP (ref 4–5.6)
HCG SERPL-ACNC: SIGNIFICANT CHANGE UP MIU/ML
HCT VFR BLD CALC: 40.7 % — SIGNIFICANT CHANGE UP (ref 34.5–45)
HGB BLD-MCNC: 13.5 G/DL — SIGNIFICANT CHANGE UP (ref 11.5–15.5)
IMM GRANULOCYTES NFR BLD AUTO: 0.3 % — SIGNIFICANT CHANGE UP (ref 0–1.5)
KETONES UR-MCNC: NEGATIVE — SIGNIFICANT CHANGE UP
LEUKOCYTE ESTERASE UR-ACNC: NEGATIVE — SIGNIFICANT CHANGE UP
LIDOCAIN IGE QN: 25.2 U/L — SIGNIFICANT CHANGE UP (ref 7–60)
LYMPHOCYTES # BLD AUTO: 2.05 K/UL — SIGNIFICANT CHANGE UP (ref 1–3.3)
LYMPHOCYTES # BLD AUTO: 22.3 % — SIGNIFICANT CHANGE UP (ref 13–44)
MCHC RBC-ENTMCNC: 28.5 PG — SIGNIFICANT CHANGE UP (ref 27–34)
MCHC RBC-ENTMCNC: 33.2 % — SIGNIFICANT CHANGE UP (ref 32–36)
MCV RBC AUTO: 85.9 FL — SIGNIFICANT CHANGE UP (ref 80–100)
MONOCYTES # BLD AUTO: 0.56 K/UL — SIGNIFICANT CHANGE UP (ref 0–0.9)
MONOCYTES NFR BLD AUTO: 6.1 % — SIGNIFICANT CHANGE UP (ref 2–14)
NEUTROPHILS # BLD AUTO: 6.43 K/UL — SIGNIFICANT CHANGE UP (ref 1.8–7.4)
NEUTROPHILS NFR BLD AUTO: 70.1 % — SIGNIFICANT CHANGE UP (ref 43–77)
NITRITE UR-MCNC: NEGATIVE — SIGNIFICANT CHANGE UP
NRBC # FLD: 0 K/UL — SIGNIFICANT CHANGE UP (ref 0–0)
PH UR: 6 — SIGNIFICANT CHANGE UP (ref 5–8)
PLATELET # BLD AUTO: 274 K/UL — SIGNIFICANT CHANGE UP (ref 150–400)
PMV BLD: 11.6 FL — SIGNIFICANT CHANGE UP (ref 7–13)
POTASSIUM SERPL-MCNC: 4.2 MMOL/L — SIGNIFICANT CHANGE UP (ref 3.5–5.3)
POTASSIUM SERPL-SCNC: 4.2 MMOL/L — SIGNIFICANT CHANGE UP (ref 3.5–5.3)
PROT SERPL-MCNC: 7.4 G/DL — SIGNIFICANT CHANGE UP (ref 6–8.3)
PROT UR-MCNC: NEGATIVE — SIGNIFICANT CHANGE UP
RBC # BLD: 4.74 M/UL — SIGNIFICANT CHANGE UP (ref 3.8–5.2)
RBC # FLD: 13 % — SIGNIFICANT CHANGE UP (ref 10.3–14.5)
SODIUM SERPL-SCNC: 140 MMOL/L — SIGNIFICANT CHANGE UP (ref 135–145)
SP GR SPEC: 1.01 — SIGNIFICANT CHANGE UP (ref 1–1.04)
UROBILINOGEN FLD QL: NORMAL — SIGNIFICANT CHANGE UP
WBC # BLD: 9.18 K/UL — SIGNIFICANT CHANGE UP (ref 3.8–10.5)
WBC # FLD AUTO: 9.18 K/UL — SIGNIFICANT CHANGE UP (ref 3.8–10.5)

## 2019-05-20 PROCEDURE — 72195 MRI PELVIS W/O DYE: CPT | Mod: 26

## 2019-05-20 PROCEDURE — 99218: CPT

## 2019-05-20 PROCEDURE — 76817 TRANSVAGINAL US OBSTETRIC: CPT | Mod: 26

## 2019-05-20 PROCEDURE — 74181 MRI ABDOMEN W/O CONTRAST: CPT | Mod: 26

## 2019-05-20 RX ORDER — ONDANSETRON 8 MG/1
4 TABLET, FILM COATED ORAL ONCE
Refills: 0 | Status: COMPLETED | OUTPATIENT
Start: 2019-05-20 | End: 2019-05-20

## 2019-05-20 RX ORDER — ONDANSETRON 8 MG/1
4 TABLET, FILM COATED ORAL EVERY 4 HOURS
Refills: 0 | Status: DISCONTINUED | OUTPATIENT
Start: 2019-05-20 | End: 2019-05-24

## 2019-05-20 RX ORDER — SODIUM CHLORIDE 9 MG/ML
1000 INJECTION INTRAMUSCULAR; INTRAVENOUS; SUBCUTANEOUS
Refills: 0 | Status: DISCONTINUED | OUTPATIENT
Start: 2019-05-20 | End: 2019-05-24

## 2019-05-20 RX ORDER — ACETAMINOPHEN 500 MG
1000 TABLET ORAL ONCE
Refills: 0 | Status: COMPLETED | OUTPATIENT
Start: 2019-05-20 | End: 2019-05-20

## 2019-05-20 RX ADMIN — Medication 1000 MILLIGRAM(S): at 20:00

## 2019-05-20 RX ADMIN — ONDANSETRON 4 MILLIGRAM(S): 8 TABLET, FILM COATED ORAL at 15:17

## 2019-05-20 RX ADMIN — Medication 400 MILLIGRAM(S): at 19:11

## 2019-05-20 RX ADMIN — SODIUM CHLORIDE 150 MILLILITER(S): 9 INJECTION INTRAMUSCULAR; INTRAVENOUS; SUBCUTANEOUS at 19:39

## 2019-05-20 RX ADMIN — Medication 1000 MILLIGRAM(S): at 19:39

## 2019-05-20 NOTE — CONSULT NOTE ADULT - ASSESSMENT
28yo  at 6w5d by LMP evaluated for diffuse abdominal and back pain. TVUS consistent with viable IUP at 6w. Pt with benign abdominal exam, no acute GYN complaints. Viable IUP confirmed on sono.       FULL NOTE TO FOLLOW 30yo  at 6w5d by LMP evaluated for diffuse abdominal and back pain. TVUS consistent with viable IUP at 6w. Pt with benign abdominal exam, no acute GYN complaints. Viable IUP confirmed on sono.   -Pt to be discussed with attending with final recommendations to follow

## 2019-05-20 NOTE — ED CDU PROVIDER INITIAL DAY NOTE - OBJECTIVE STATEMENT
30 y/o female,  (7 weeks pregnant), LMP , with no other significant medical history, presents to the ED complaining of persisting UTI symptoms.  Patient complains of suprapubic tenderness and urinary frequency 1 week ago.  She also experiences mid-thoracic back and flank pain that comes and goes.  The suprapubic tenderness, back pain and flank pain are described as a deep, aching pain that is graded a 6/10.  She has not taken anything for her pain.  Patient was seen by Urgent Care on Monday.   UA from Urgent Care was negative, however she was prescribed Amoxicillin due to her symptoms.  Patient has finished her course of Amoxicillin without relief of her symptoms.  Dr. De La Cruz recommended further evaluation in ED.  Denies dysuria, cloudy urine, fever, chills, SOB, chest pain, vomiting, vaginal bleeding, change in bowel movements.    CDU CASTILLO Lynn: 30 y/o F  currently 6 weeks pregnant here w/ periumbilical abdominal pain x 1 week, worse in the RLQ, associated w/ urinary frequency. States she initially went to an urgent care where she was treated for a UTI. Pt took abx w/o relief. When sx continued today, she called her gyn Dr. De La Cruz, who recommended going to the ED for further evaluation. Pt currently admits to lower abdominal cramps, sent to CDU for MRI abdomen to R/O acute appendicitis. Denies fevers, chills, N/V, vaginal discharge or any other complaints.

## 2019-05-20 NOTE — ED CDU PROVIDER INITIAL DAY NOTE - ABDOMINAL TENDER
VSS on RA. Bottled well throughout night. Voiding and stooling. Parents independent with cares. Plan on discharge today. Continue to monitor, report changes to provider.    suprapubic

## 2019-05-20 NOTE — ED ADULT TRIAGE NOTE - CHIEF COMPLAINT QUOTE
pt c/o abd pain x 7 days, went to urgi care and treated for UTI but states pain continues. pt 7w preg. denies n/v/d, vaginal bleeding/spotting.

## 2019-05-20 NOTE — ED PROVIDER NOTE - OBJECTIVE STATEMENT
30 y/o female,  (7 weeks pregnant), LMP , with no other significant medical history, presents to the ED complaining of persisting UTI symptoms.  Patient complains of suprapubic tenderness and urinary frequency 1 week ago.  She also experiences mid-thoracic back and flank pain that comes and goes.  The suprapubic tenderness, back pain and flank pain are described as a deep, aching pain that is graded a 6/10.  She has not taken anything for her pain.  Patient was seen by Urgent Care on Monday.   UA from Urgent Care was negative, however she was prescribed Amoxicillin due to her symptoms.  Patient has finished her course of Amoxicillin without relief of her symptoms.  Dr. De La Cruz recommended further evaluation in ED.  Denies dysuria, cloudy urine, fever, chills, SOB, chest pain, vomiting, vaginal bleeding, change in bowel movements. 28 y/o female,  (7 weeks pregnant), LMP , with no other significant medical history, presents to the ED complaining of persisting UTI symptoms.  Patient complains of suprapubic tenderness and urinary frequency 1 week ago.  She also experiences mid-thoracic back and flank pain that comes and goes.  The suprapubic tenderness, back pain and flank pain are described as a deep, aching pain that is graded a 6/10.  She has not taken anything for her pain.  Patient was seen by Urgent Care on Monday.   UA from Urgent Care was negative, however she was prescribed Amoxicillin due to her symptoms.  Patient has finished her course of Amoxicillin without relief of her symptoms.  Dr. De La Cruz recommended further evaluation in ED.  Denies dysuria, cloudy urine, fever, chills, SOB, chest pain, vomiting, vaginal bleeding, change in bowel movements.  Moe: 28 y/o F w/o Hx  @ 7 wks pw 1 wk of suprapubic pain, frequency and intermittent R flank pain.  Initially thought 2/2 to UTI, no improvement on amox.  Went to OB who advised ED visit.  No CP, SOB, fever, vomiting, d/c.

## 2019-05-20 NOTE — ED CDU PROVIDER INITIAL DAY NOTE - ATTENDING CONTRIBUTION TO CARE
I, Mikael Rosa MD, personally saw the patient with ACP.  I have personally performed a face to face diagnostic evaluation on this patient.  I have reviewed the ACP note and agree with the history, exam, and plan of care, except as noted.    pt w/ persistent RLQ pain and unremarkable w/u so far.  placed in CDU for MRI for evaluation of appendicitis.

## 2019-05-20 NOTE — ED PROVIDER NOTE - PHYSICAL EXAMINATION
Moe:  ***GEN - NAD; well appearing; A+O x3   ***PULMONARY - CTA b/l, symmetric breath sounds. ***CARDIAC -s1s2, RRR, no M,G,R  ***ABDOMEN - ND, RLQ TTP, soft, no guarding, no rebound, no ladi's   ***SKIN - no rash or bruising   ***NEUROLOGIC - alert and oriented, follows commands, sensation nl, motor nl, ***PSYCH - insight and judgment nl, memory nl, affect nl, thought nl

## 2019-05-20 NOTE — CONSULT NOTE ADULT - SUBJECTIVE AND OBJECTIVE BOX
HPI:  30yo  at 6w5d by LMP 4/3 p/w complaint of diffuse abdominal and back pain x1wk. Pain is cramping and aching in nature and intermittent. PMH significant for chronic back pain. Pain is 5/10 severity, she has not taken any medication to relive pain. Pain is worse with movement and better with rest. Pt also admits to dizziness/lightheadedness x5d. She is tolerating PO intake. Pt denies suprapubic pain, dysuria, increased urinary frequency/urgency, constipation/diarrhea, fever/chills, nausea/vomiting, cp/sob. Pt previously seen at  for symptoms and found to have a negative UA however she was discharged with Amoxicillin as there was suspicious symptoms due to UTI (however pt denies dysuria, hematuria). pt denies vaginal bleeding, vaginal discharge.       OB/GYN HISTORY:    status post uncomplicated    Regular monthly menses  GYN = Dr. De La Cruz  Last pap = 2018 nml per pt   denies h/o abnl paps, fibroids, endometriosis    PAST MEDICAL & SURGICAL HISTORY:  No pertinent past medical history  No significant past surgical history    Allergies    No Known Allergies    Intolerances    MEDICATIONS  (STANDING):  sodium chloride 0.9%. 1000 milliLiter(s) (150 mL/Hr) IV Continuous <Continuous>    MEDICATIONS  (PRN):  ondansetron Injectable 4 milliGRAM(s) IV Push every 4 hours PRN Nausea and/or Vomiting    FAMILY HISTORY:  noncontributory     SOCIAL HISTORY:   denies etoh abuse, denies tobacco use, denies drug use.       Vital Signs Last 24 Hrs  T(C): 36.6 (21 May 2019 00:27), Max: 36.7 (20 May 2019 13:08)  T(F): 97.9 (21 May 2019 00:27), Max: 98.1 (20 May 2019 20:30)  HR: 87 (21 May 2019 00:27) (87 - 94)  BP: 117/60 (21 May 2019 00:27) (117/60 - 131/51)  BP(mean): --  RR: 18 (21 May 2019 00:27) (17 - 18)  SpO2: 100% (21 May 2019 00:27) (99% - 100%)    PHYSICAL EXAM:      Constitutional: alert and oriented x 3    Respiratory: clear to ascultation bilaterally     Cardiovascular: regular rate and rhythm, no murmur    Gastrointestinal: soft, non tender, non-distended,  no rebound/guarding, + bowel sounds. No organomegaly, no palpable masses    Rectal: deferred    Extremities: Non-tender bilaterally, No edema    Neurological: Grossly intact            LABS:                        13.5   9.18  )-----------( 274      ( 20 May 2019 15:30 )             40.7     -    140  |  103  |  8   ----------------------------<  97  4.2   |  23  |  0.47<L>    Ca    10.3      20 May 2019 15:30    TPro  7.4  /  Alb  4.6  /  TBili  0.3  /  DBili  x   /  AST  18  /  ALT  24  /  AlkPhos  61        Urinalysis Basic - ( 20 May 2019 16:19 )    Color: LIGHT YELLOW / Appearance: CLEAR / S.010 / pH: 6.0  Gluc: NEGATIVE / Ketone: NEGATIVE  / Bili: NEGATIVE / Urobili: NORMAL   Blood: NEGATIVE / Protein: NEGATIVE / Nitrite: NEGATIVE   Leuk Esterase: NEGATIVE / RBC: x / WBC x   Sq Epi: x / Non Sq Epi: x / Bacteria: x            RADIOLOGY & ADDITIONAL STUDIES:  < from: US Transvaginal, OB (19 @ 16:33) >    EXAM:  US OB TRANSVAGINAL        PROCEDURE DATE:  May 20 2019         INTERPRETATION:  CLINICAL INFORMATION: Lower abdominal pain and dysuria.    LMP: 4/3/2019.    Estimated Gestational Age by LMP: 6 weeks 5 days.    COMPARISON: Pelvic sonogram 2018.    Endovaginal pelvic sonogram only. Color and Spectral Doppler was   performed.    FINDINGS:    Uterus: Within normal limits.    Gestational Sac Size (mean): 1.7 cm, consistent with 6 weeks 0 days.    Crown Rump Length: 0.6 cm.     Estimated Gestational Age: 6 weeks 3 days.    Yolk Sac: Normal.    Fetal Heart Rate: 119 bpm.    Right ovary: 1.4 x 1.2 x 1.7 cm. Within normal limits. Normal arterial   and venous waveforms.    Left ovary: 1.8 x 0.9 x 1.9 cm. Within normal limits. Normal arterial and   venous waveforms.    Fluid: None.    IMPRESSION:    Single viable intrauterine pregnancy.  Estimated gestational age of 6 weeks 0 days.  Estimated due date of  2020.    LEXA TELLEZ M.D., ATTENDING RADIOLOGIST  This document has been electronically signed. May 20 2019  4:44PM          < end of copied text >    < from: MR Pelvis No Cont (.20.19 @ 22:44) >    EXAM:  MR PELVIS      EXAM:  MR ABDOMEN        PROCEDURE DATE:  May 20 2019         INTERPRETATION:  CLINICAL INFORMATION: Right lower quadrant abdominal pain    COMPARISON: CT abdomen/pelvis 2018    PROCEDURE:   MRI of the pelvis was performed without intravenous contrast.  -    FINDINGS:    A normal appendix is identified. There are no inflammatory changes in the   right lower quadrant abdominal area.    Both ovaries are within normal limits. There are no adnexal masses seen.    There isan intrauterine gestational sac. There is a trace amount of   pelvic free fluid.     The retroperitoneal vascular structures are within normal limits.    The visualized portions of the liver, gallbladder, spleen, pancreas and   kidneys are within normal limits.    IMPRESSION:    Normal appendix.    LEROY LARES M.D., RADIOLOGY RESIDENT  This document has been electronically signed.  LEIGH ANN SADLER M.D., ATTENDING RADIOLOGIST  This document has been electronically signed. May 20 2019 11:30PM     < end of copied text >

## 2019-05-21 VITALS
HEART RATE: 88 BPM | SYSTOLIC BLOOD PRESSURE: 118 MMHG | DIASTOLIC BLOOD PRESSURE: 54 MMHG | OXYGEN SATURATION: 100 % | RESPIRATION RATE: 18 BRPM | TEMPERATURE: 98 F

## 2019-05-21 DIAGNOSIS — O26.899 OTHER SPECIFIED PREGNANCY RELATED CONDITIONS, UNSPECIFIED TRIMESTER: ICD-10-CM

## 2019-05-21 PROCEDURE — 99217: CPT

## 2019-05-21 NOTE — ED CDU PROVIDER SUBSEQUENT DAY NOTE - CONSTITUTIONAL, MLM
normal... Well appearing, well nourished, awake, alert, oriented to person, place, time/situation and in no apparent distress.  Pt. verbalizing clearly and effortlessly.

## 2019-05-21 NOTE — ED CDU PROVIDER DISPOSITION NOTE - CLINICAL COURSE
Patient is a 31 yo female that was admitted to observation for abdominal pain during pregnancy. She is  at 7 weeks. She had a negative urinalysis and MRI of the abdomen. She was seen and cleared by OBGYN. She has been comfortable and tolerating PO. At this point, patient is stable for discharge and f/u with OBGYN as an outpatient. Return precautions discussed.

## 2019-05-21 NOTE — ED CDU PROVIDER SUBSEQUENT DAY NOTE - PROGRESS NOTE DETAILS
Pt objectively noted to be resting comfortably in the interim; no issues thus far.  Pt. will be signed out to the day CDU PA (Ursula) and attending (Dr. Gonzalez) at 0700 hrs.

## 2019-05-21 NOTE — ED CDU PROVIDER SUBSEQUENT DAY NOTE - HISTORY
CDU Initial Note HPI: "..28 y/o F  currently 6 weeks pregnant here w/ periumbilical abdominal pain x 1 week, worse in the RLQ, associated w/ urinary frequency. States she initially went to an urgent care where she was treated for a UTI. Pt took abx w/o relief. When sx continued today, she called her gyn Dr. De La Cruz, who recommended going to the ED for further evaluation. Pt currently admits to lower abdominal cramps, sent to CDU for MRI abdomen to R/O acute appendicitis. Denies fevers, chills, N/V, vaginal discharge or any other complaints."  In the interim, pt objectively noted to be resting comfortably; no issues thus far.  MRI abdomen/pelvis completed; "trace amount of pelvic free fluid" noted, but no acute pathology identified.  Pt. was evaluated by Ob/Gyn team; no acute intervention felt warranted.  PO challenge with clears will be attempted.

## 2019-05-21 NOTE — PROGRESS NOTE ADULT - SUBJECTIVE AND OBJECTIVE BOX
Doing well  Not bleeding anymore  AVSS   RENATE >200 cc last shift  Hgb 10.0  creatinine 0.71  abd; soft, NT, ND +BS  ext:0c/c/e  plan:  possible DC RENATE and DC home tomorrow Patient seen and examined at bedside, no acute overnight events. No acute complaints other than some cramps, pain well controlled. No VB. Patient is ambulating, passing flatus, voiding spontaneously, and tolerating regular diet. Denies CP, SOB, N/V, fevers, and chills.    Vital Signs Last 24 Hours  T(C): 36.8 (05-21-19 @ 05:00), Max: 36.8 (05-21-19 @ 05:00)  HR: 88 (05-21-19 @ 05:00) (87 - 94)  BP: 118/54 (05-21-19 @ 05:00) (117/60 - 131/51)  RR: 18 (05-21-19 @ 05:00) (17 - 18)  SpO2: 100% (05-21-19 @ 05:00) (99% - 100%)    I&O's Detail      Physical Exam:  General: NAD  CV: NR, RR, S1, S2, no M/R/G  Lungs: CTA-B  Abdomen: Soft, mildly LLQ tenderness on deep palpation, non-distended, +BS  Ext: No pain or swelling    Labs:             13.5   9.18  )-----------( 274      ( 05-20 @ 15:30 )             40.7         MEDICATIONS  (STANDING):  sodium chloride 0.9%. 1000 milliLiter(s) (150 mL/Hr) IV Continuous <Continuous>    MEDICATIONS  (PRN):  ondansetron Injectable 4 milliGRAM(s) IV Push every 4 hours PRN Nausea and/or Vomiting

## 2019-05-21 NOTE — PROGRESS NOTE ADULT - PROBLEM SELECTOR PLAN 1
- no acute obstetric issues at this time, plan to discuss clearance for discharge with attending  - counseled to return to office or ED if abdominal pain worsens or with vaginal bleeding  - plan to f/u at Dr. De La Cruz's office in 1-2 weeks    H Jose PGY1

## 2019-05-21 NOTE — PROGRESS NOTE ADULT - ASSESSMENT
29yo  @ 6w5dp/w abdominal pain. CDU overnight for r/o appy. MRI revealed normal appendix. TVUS showed IUP consistent with dates. Cramping likely consistent with 1st trimester cramping. No acute obstetric issues at this time.

## 2019-05-22 LAB
BACTERIA UR CULT: SIGNIFICANT CHANGE UP
SPECIMEN SOURCE: SIGNIFICANT CHANGE UP

## 2019-06-03 ENCOUNTER — APPOINTMENT (OUTPATIENT)
Dept: OBGYN | Facility: CLINIC | Age: 29
End: 2019-06-03
Payer: MEDICAID

## 2019-06-03 VITALS
BODY MASS INDEX: 41.95 KG/M2 | SYSTOLIC BLOOD PRESSURE: 120 MMHG | DIASTOLIC BLOOD PRESSURE: 70 MMHG | WEIGHT: 293 LBS | HEIGHT: 70 IN

## 2019-06-03 DIAGNOSIS — Z30.9 ENCOUNTER FOR CONTRACEPTIVE MANAGEMENT, UNSPECIFIED: ICD-10-CM

## 2019-06-03 LAB
HCG UR QL: POSITIVE
QUALITY CONTROL: YES

## 2019-06-03 PROCEDURE — 81025 URINE PREGNANCY TEST: CPT

## 2019-06-03 PROCEDURE — 81003 URINALYSIS AUTO W/O SCOPE: CPT | Mod: QW

## 2019-06-03 PROCEDURE — 0501F PRENATAL FLOW SHEET: CPT

## 2019-06-03 PROCEDURE — 76817 TRANSVAGINAL US OBSTETRIC: CPT

## 2019-06-04 ENCOUNTER — RESULT CHARGE (OUTPATIENT)
Age: 29
End: 2019-06-04

## 2019-06-04 ENCOUNTER — APPOINTMENT (OUTPATIENT)
Dept: OBGYN | Facility: CLINIC | Age: 29
End: 2019-06-04

## 2019-06-04 LAB
BILIRUB UR QL STRIP: NORMAL
GLUCOSE UR-MCNC: NORMAL
HCG UR QL: 0.2 EU/DL
HGB UR QL STRIP.AUTO: NORMAL
KETONES UR-MCNC: NORMAL
LEUKOCYTE ESTERASE UR QL STRIP: NORMAL
NITRITE UR QL STRIP: NORMAL
PH UR STRIP: 5.5
PROT UR STRIP-MCNC: NORMAL
SP GR UR STRIP: 1.03

## 2019-06-06 ENCOUNTER — NON-APPOINTMENT (OUTPATIENT)
Age: 29
End: 2019-06-06

## 2019-06-06 LAB
BACTERIA UR CULT: NORMAL
C TRACH RRNA SPEC QL NAA+PROBE: NOT DETECTED
N GONORRHOEA RRNA SPEC QL NAA+PROBE: NOT DETECTED
SOURCE AMPLIFICATION: NORMAL
SOURCE AMPLIFICATION: NORMAL
T VAGINALIS RRNA SPEC QL NAA+PROBE: NOT DETECTED

## 2019-06-06 RX ORDER — NORETHINDRONE ACETATE AND ETHINYL ESTRADIOL AND FERROUS FUMARATE 1MG-20(21)
1-20 KIT ORAL DAILY
Qty: 28 | Refills: 5 | Status: DISCONTINUED | COMMUNITY
Start: 2018-08-14 | End: 2019-06-06

## 2019-06-06 RX ORDER — NORETHINDRONE ACETATE AND ETHINYL ESTRADIOL AND FERROUS FUMARATE 1MG-20(21)
1-20 KIT ORAL
Qty: 28 | Refills: 0 | Status: DISCONTINUED | COMMUNITY
Start: 2018-08-14 | End: 2019-06-06

## 2019-06-25 ENCOUNTER — NON-APPOINTMENT (OUTPATIENT)
Age: 29
End: 2019-06-25

## 2019-06-25 ENCOUNTER — APPOINTMENT (OUTPATIENT)
Dept: OBGYN | Facility: CLINIC | Age: 29
End: 2019-06-25
Payer: MEDICAID

## 2019-06-25 VITALS
HEIGHT: 70 IN | WEIGHT: 293 LBS | SYSTOLIC BLOOD PRESSURE: 116 MMHG | BODY MASS INDEX: 41.95 KG/M2 | DIASTOLIC BLOOD PRESSURE: 68 MMHG

## 2019-06-25 DIAGNOSIS — O36.80X0 PREGNANCY WITH INCONCLUSIVE FETAL VIABILITY, NOT APPLICABLE OR UNSPECIFIED: ICD-10-CM

## 2019-06-25 LAB
BILIRUB UR QL STRIP: NORMAL
GLUCOSE UR-MCNC: NORMAL
HCG UR QL: 0.2 EU/DL
HGB UR QL STRIP.AUTO: NORMAL
KETONES UR-MCNC: NORMAL
LEUKOCYTE ESTERASE UR QL STRIP: NORMAL
NITRITE UR QL STRIP: NORMAL
PH UR STRIP: 6
PROT UR STRIP-MCNC: NORMAL
SP GR UR STRIP: 1.01

## 2019-06-25 PROCEDURE — 36415 COLL VENOUS BLD VENIPUNCTURE: CPT

## 2019-06-25 PROCEDURE — 0502F SUBSEQUENT PRENATAL CARE: CPT

## 2019-06-25 PROCEDURE — 76801 OB US < 14 WKS SINGLE FETUS: CPT

## 2019-06-27 LAB
ABO + RH PNL BLD: NORMAL
B19V IGG SER QL IA: 5.5 INDEX
B19V IGG+IGM SER-IMP: NORMAL
B19V IGG+IGM SER-IMP: POSITIVE
B19V IGM FLD-ACNC: 0.5
B19V IGM SER-ACNC: NEGATIVE
BASOPHILS # BLD AUTO: 0.02 K/UL
BASOPHILS NFR BLD AUTO: 0.2 %
BLD GP AB SCN SERPL QL: NORMAL
EOSINOPHIL # BLD AUTO: 0.02 K/UL
EOSINOPHIL NFR BLD AUTO: 0.2 %
HBV SURFACE AG SERPL QL IA: NONREACTIVE
HCT VFR BLD CALC: 36.7 %
HCV AB SER QL: NONREACTIVE
HCV S/CO RATIO: 0.09 S/CO
HGB BLD-MCNC: 11.6 G/DL
HIV1+2 AB SPEC QL IA.RAPID: NONREACTIVE
IMM GRANULOCYTES NFR BLD AUTO: 0.5 %
LEAD BLD-MCNC: <1 UG/DL
LYMPHOCYTES # BLD AUTO: 1.52 K/UL
LYMPHOCYTES NFR BLD AUTO: 18 %
MAN DIFF?: NORMAL
MCHC RBC-ENTMCNC: 28.1 PG
MCHC RBC-ENTMCNC: 31.6 GM/DL
MCV RBC AUTO: 88.9 FL
MEV IGG FLD QL IA: 23.1 AU/ML
MEV IGG+IGM SER-IMP: NEGATIVE
MONOCYTES # BLD AUTO: 0.41 K/UL
MONOCYTES NFR BLD AUTO: 4.9 %
NEUTROPHILS # BLD AUTO: 6.43 K/UL
NEUTROPHILS NFR BLD AUTO: 76.2 %
PLATELET # BLD AUTO: 229 K/UL
RBC # BLD: 4.13 M/UL
RBC # FLD: 13.6 %
RUBV IGG FLD-ACNC: 1.1 INDEX
RUBV IGG SER-IMP: POSITIVE
T PALLIDUM AB SER QL IA: NEGATIVE
TSH SERPL-ACNC: 1.08 UIU/ML
VZV AB TITR SER: POSITIVE
VZV IGG SER IF-ACNC: 744.3 INDEX
WBC # FLD AUTO: 8.44 K/UL

## 2019-07-02 ENCOUNTER — OTHER (OUTPATIENT)
Age: 29
End: 2019-07-02

## 2019-07-23 ENCOUNTER — APPOINTMENT (OUTPATIENT)
Dept: OBGYN | Facility: CLINIC | Age: 29
End: 2019-07-23
Payer: MEDICAID

## 2019-07-23 ENCOUNTER — NON-APPOINTMENT (OUTPATIENT)
Age: 29
End: 2019-07-23

## 2019-07-23 VITALS
HEIGHT: 70 IN | WEIGHT: 293 LBS | SYSTOLIC BLOOD PRESSURE: 120 MMHG | DIASTOLIC BLOOD PRESSURE: 80 MMHG | BODY MASS INDEX: 41.95 KG/M2

## 2019-07-23 PROCEDURE — 0502F SUBSEQUENT PRENATAL CARE: CPT

## 2019-07-25 ENCOUNTER — APPOINTMENT (OUTPATIENT)
Dept: ANTEPARTUM | Facility: CLINIC | Age: 29
End: 2019-07-25

## 2019-07-25 ENCOUNTER — APPOINTMENT (OUTPATIENT)
Dept: MATERNAL FETAL MEDICINE | Facility: CLINIC | Age: 29
End: 2019-07-25
Payer: MEDICAID

## 2019-07-25 ENCOUNTER — ASOB RESULT (OUTPATIENT)
Age: 29
End: 2019-07-25

## 2019-07-25 PROCEDURE — 99213 OFFICE O/P EST LOW 20 MIN: CPT

## 2019-07-29 LAB
2ND TRIMESTER DATA: NORMAL
AFP PNL SERPL: NORMAL
AFP SERPL-ACNC: NORMAL
B-HCG FREE SERPL-MCNC: NORMAL
CLINICAL BIOCHEMIST REVIEW: NORMAL
INHIBIN A SERPL-MCNC: NORMAL
NOTES NTD: NORMAL
U ESTRIOL SERPL-SCNC: NORMAL

## 2019-08-20 ENCOUNTER — NON-APPOINTMENT (OUTPATIENT)
Age: 29
End: 2019-08-20

## 2019-08-20 ENCOUNTER — APPOINTMENT (OUTPATIENT)
Dept: OBGYN | Facility: CLINIC | Age: 29
End: 2019-08-20
Payer: MEDICAID

## 2019-08-20 VITALS
BODY MASS INDEX: 41.95 KG/M2 | HEIGHT: 70 IN | DIASTOLIC BLOOD PRESSURE: 64 MMHG | WEIGHT: 293 LBS | SYSTOLIC BLOOD PRESSURE: 120 MMHG

## 2019-08-20 LAB
BILIRUB UR QL STRIP: NORMAL
GLUCOSE UR-MCNC: NORMAL
HCG UR QL: 0.2 EU/DL
HGB UR QL STRIP.AUTO: NORMAL
KETONES UR-MCNC: NORMAL
LEUKOCYTE ESTERASE UR QL STRIP: NORMAL
NITRITE UR QL STRIP: NORMAL
PH UR STRIP: 6
PROT UR STRIP-MCNC: NORMAL
SP GR UR STRIP: 1.02

## 2019-08-20 PROCEDURE — 0502F SUBSEQUENT PRENATAL CARE: CPT

## 2019-08-23 ENCOUNTER — APPOINTMENT (OUTPATIENT)
Dept: ANTEPARTUM | Facility: CLINIC | Age: 29
End: 2019-08-23
Payer: MEDICAID

## 2019-08-23 ENCOUNTER — ASOB RESULT (OUTPATIENT)
Age: 29
End: 2019-08-23

## 2019-08-23 PROCEDURE — 76805 OB US >/= 14 WKS SNGL FETUS: CPT

## 2019-09-06 ENCOUNTER — APPOINTMENT (OUTPATIENT)
Dept: ANTEPARTUM | Facility: CLINIC | Age: 29
End: 2019-09-06

## 2019-09-17 ENCOUNTER — NON-APPOINTMENT (OUTPATIENT)
Age: 29
End: 2019-09-17

## 2019-09-17 ENCOUNTER — APPOINTMENT (OUTPATIENT)
Dept: OBGYN | Facility: CLINIC | Age: 29
End: 2019-09-17
Payer: MEDICAID

## 2019-09-17 VITALS
BODY MASS INDEX: 41.95 KG/M2 | WEIGHT: 293 LBS | HEIGHT: 70 IN | SYSTOLIC BLOOD PRESSURE: 114 MMHG | DIASTOLIC BLOOD PRESSURE: 70 MMHG

## 2019-09-17 DIAGNOSIS — Z3A.15 15 WEEKS GESTATION OF PREGNANCY: ICD-10-CM

## 2019-09-17 PROCEDURE — 0502F SUBSEQUENT PRENATAL CARE: CPT

## 2019-09-20 LAB
BILIRUB UR QL STRIP: NORMAL
GLUCOSE UR-MCNC: NORMAL
HCG UR QL: 0.2 EU/DL
HGB UR QL STRIP.AUTO: NORMAL
KETONES UR-MCNC: NORMAL
LEUKOCYTE ESTERASE UR QL STRIP: NORMAL
NITRITE UR QL STRIP: NORMAL
PH UR STRIP: 6
PROT UR STRIP-MCNC: NORMAL
SP GR UR STRIP: 1.03

## 2019-10-15 ENCOUNTER — NON-APPOINTMENT (OUTPATIENT)
Age: 29
End: 2019-10-15

## 2019-10-15 ENCOUNTER — APPOINTMENT (OUTPATIENT)
Dept: OBGYN | Facility: CLINIC | Age: 29
End: 2019-10-15
Payer: MEDICAID

## 2019-10-15 VITALS
WEIGHT: 293 LBS | HEIGHT: 70 IN | SYSTOLIC BLOOD PRESSURE: 124 MMHG | DIASTOLIC BLOOD PRESSURE: 74 MMHG | BODY MASS INDEX: 41.95 KG/M2

## 2019-10-15 DIAGNOSIS — Z3A.23 23 WEEKS GESTATION OF PREGNANCY: ICD-10-CM

## 2019-10-15 LAB
BILIRUB UR QL STRIP: NORMAL
GLUCOSE UR-MCNC: NORMAL
HCG UR QL: 0.2 EU/DL
HGB UR QL STRIP.AUTO: NORMAL
KETONES UR-MCNC: NORMAL
LEUKOCYTE ESTERASE UR QL STRIP: NORMAL
NITRITE UR QL STRIP: NORMAL
PH UR STRIP: 6.5
PROT UR STRIP-MCNC: NORMAL
SP GR UR STRIP: 1.01

## 2019-10-15 PROCEDURE — 0502F SUBSEQUENT PRENATAL CARE: CPT

## 2019-10-15 PROCEDURE — 36415 COLL VENOUS BLD VENIPUNCTURE: CPT

## 2019-10-16 ENCOUNTER — NON-APPOINTMENT (OUTPATIENT)
Age: 29
End: 2019-10-16

## 2019-10-16 LAB
BASOPHILS # BLD AUTO: 0.02 K/UL
BASOPHILS NFR BLD AUTO: 0.2 %
EOSINOPHIL # BLD AUTO: 0.03 K/UL
EOSINOPHIL NFR BLD AUTO: 0.3 %
GLUCOSE 1H P 50 G GLC PO SERPL-MCNC: 110 MG/DL
HCT VFR BLD CALC: 34.9 %
HGB BLD-MCNC: 11.2 G/DL
IMM GRANULOCYTES NFR BLD AUTO: 0.6 %
LYMPHOCYTES # BLD AUTO: 1.46 K/UL
LYMPHOCYTES NFR BLD AUTO: 14.4 %
MAN DIFF?: NORMAL
MCHC RBC-ENTMCNC: 28.1 PG
MCHC RBC-ENTMCNC: 32.1 GM/DL
MCV RBC AUTO: 87.7 FL
MONOCYTES # BLD AUTO: 0.48 K/UL
MONOCYTES NFR BLD AUTO: 4.7 %
NEUTROPHILS # BLD AUTO: 8.12 K/UL
NEUTROPHILS NFR BLD AUTO: 79.8 %
PLATELET # BLD AUTO: 190 K/UL
RBC # BLD: 3.98 M/UL
RBC # FLD: 14.6 %
T PALLIDUM AB SER QL IA: NEGATIVE
WBC # FLD AUTO: 10.17 K/UL

## 2019-11-04 ENCOUNTER — ASOB RESULT (OUTPATIENT)
Age: 29
End: 2019-11-04

## 2019-11-04 ENCOUNTER — APPOINTMENT (OUTPATIENT)
Dept: ANTEPARTUM | Facility: CLINIC | Age: 29
End: 2019-11-04
Payer: MEDICAID

## 2019-11-04 PROCEDURE — 76816 OB US FOLLOW-UP PER FETUS: CPT

## 2019-11-12 ENCOUNTER — NON-APPOINTMENT (OUTPATIENT)
Age: 29
End: 2019-11-12

## 2019-11-12 ENCOUNTER — APPOINTMENT (OUTPATIENT)
Dept: OBGYN | Facility: CLINIC | Age: 29
End: 2019-11-12
Payer: MEDICAID

## 2019-11-12 VITALS
DIASTOLIC BLOOD PRESSURE: 68 MMHG | HEIGHT: 70 IN | SYSTOLIC BLOOD PRESSURE: 114 MMHG | BODY MASS INDEX: 41.95 KG/M2 | WEIGHT: 293 LBS

## 2019-11-12 DIAGNOSIS — Z3A.27 27 WEEKS GESTATION OF PREGNANCY: ICD-10-CM

## 2019-11-12 DIAGNOSIS — Z23 ENCOUNTER FOR IMMUNIZATION: ICD-10-CM

## 2019-11-12 PROCEDURE — 90471 IMMUNIZATION ADMIN: CPT

## 2019-11-12 PROCEDURE — 90472 IMMUNIZATION ADMIN EACH ADD: CPT

## 2019-11-12 PROCEDURE — 90715 TDAP VACCINE 7 YRS/> IM: CPT

## 2019-11-12 PROCEDURE — 90656 IIV3 VACC NO PRSV 0.5 ML IM: CPT

## 2019-11-13 LAB
BILIRUB UR QL STRIP: NORMAL
GLUCOSE UR-MCNC: NORMAL
HCG UR QL: 0.2 EU/DL
HGB UR QL STRIP.AUTO: NORMAL
KETONES UR-MCNC: NORMAL
LEUKOCYTE ESTERASE UR QL STRIP: NORMAL
NITRITE UR QL STRIP: NORMAL
PH UR STRIP: 7
PROT UR STRIP-MCNC: NORMAL
SP GR UR STRIP: 1.02

## 2019-11-26 ENCOUNTER — NON-APPOINTMENT (OUTPATIENT)
Age: 29
End: 2019-11-26

## 2019-11-26 ENCOUNTER — OUTPATIENT (OUTPATIENT)
Dept: OUTPATIENT SERVICES | Facility: HOSPITAL | Age: 29
LOS: 1 days | Discharge: HOME | End: 2019-11-26
Payer: MEDICAID

## 2019-11-26 ENCOUNTER — APPOINTMENT (OUTPATIENT)
Dept: OBGYN | Facility: CLINIC | Age: 29
End: 2019-11-26
Payer: MEDICAID

## 2019-11-26 VITALS
DIASTOLIC BLOOD PRESSURE: 90 MMHG | SYSTOLIC BLOOD PRESSURE: 142 MMHG | BODY MASS INDEX: 41.95 KG/M2 | WEIGHT: 293 LBS | HEIGHT: 70 IN

## 2019-11-26 VITALS
HEART RATE: 107 BPM | SYSTOLIC BLOOD PRESSURE: 124 MMHG | RESPIRATION RATE: 16 BRPM | TEMPERATURE: 99 F | DIASTOLIC BLOOD PRESSURE: 71 MMHG

## 2019-11-26 VITALS — HEART RATE: 108 BPM | OXYGEN SATURATION: 98 %

## 2019-11-26 DIAGNOSIS — O26.899 OTHER SPECIFIED PREGNANCY RELATED CONDITIONS, UNSPECIFIED TRIMESTER: ICD-10-CM

## 2019-11-26 DIAGNOSIS — Z3A.00 WEEKS OF GESTATION OF PREGNANCY NOT SPECIFIED: ICD-10-CM

## 2019-11-26 LAB
ALBUMIN SERPL ELPH-MCNC: 3.6 G/DL — SIGNIFICANT CHANGE UP (ref 3.3–5)
ALP SERPL-CCNC: 73 U/L — SIGNIFICANT CHANGE UP (ref 40–120)
ALT FLD-CCNC: 10 U/L — SIGNIFICANT CHANGE UP (ref 4–33)
ANION GAP SERPL CALC-SCNC: 12 MMO/L — SIGNIFICANT CHANGE UP (ref 7–14)
APPEARANCE UR: SIGNIFICANT CHANGE UP
APTT BLD: 26.4 SEC — LOW (ref 27.5–36.3)
AST SERPL-CCNC: 11 U/L — SIGNIFICANT CHANGE UP (ref 4–32)
BACTERIA # UR AUTO: SIGNIFICANT CHANGE UP
BASOPHILS # BLD AUTO: 0.03 K/UL — SIGNIFICANT CHANGE UP (ref 0–0.2)
BASOPHILS NFR BLD AUTO: 0.3 % — SIGNIFICANT CHANGE UP (ref 0–2)
BILIRUB SERPL-MCNC: 0.4 MG/DL — SIGNIFICANT CHANGE UP (ref 0.2–1.2)
BILIRUB UR QL STRIP: NORMAL
BILIRUB UR-MCNC: NEGATIVE — SIGNIFICANT CHANGE UP
BLOOD UR QL VISUAL: NEGATIVE — SIGNIFICANT CHANGE UP
BUN SERPL-MCNC: 10 MG/DL — SIGNIFICANT CHANGE UP (ref 7–23)
CALCIUM SERPL-MCNC: 9.4 MG/DL — SIGNIFICANT CHANGE UP (ref 8.4–10.5)
CHLORIDE SERPL-SCNC: 104 MMOL/L — SIGNIFICANT CHANGE UP (ref 98–107)
CO2 SERPL-SCNC: 22 MMOL/L — SIGNIFICANT CHANGE UP (ref 22–31)
COLOR SPEC: YELLOW — SIGNIFICANT CHANGE UP
CREAT ?TM UR-MCNC: 143.9 MG/DL — SIGNIFICANT CHANGE UP
CREAT SERPL-MCNC: 0.54 MG/DL — SIGNIFICANT CHANGE UP (ref 0.5–1.3)
EOSINOPHIL # BLD AUTO: 0.03 K/UL — SIGNIFICANT CHANGE UP (ref 0–0.5)
EOSINOPHIL NFR BLD AUTO: 0.3 % — SIGNIFICANT CHANGE UP (ref 0–6)
EPI CELLS # UR: SIGNIFICANT CHANGE UP
FIBRINOGEN PPP-MCNC: 658 MG/DL — HIGH (ref 350–510)
GLUCOSE SERPL-MCNC: 122 MG/DL — HIGH (ref 70–99)
GLUCOSE UR-MCNC: NEGATIVE — SIGNIFICANT CHANGE UP
GLUCOSE UR-MCNC: NORMAL
HCG UR QL: 0.2 EU/DL
HCT VFR BLD CALC: 35.8 % — SIGNIFICANT CHANGE UP (ref 34.5–45)
HGB BLD-MCNC: 11.3 G/DL — LOW (ref 11.5–15.5)
HGB UR QL STRIP.AUTO: NORMAL
IMM GRANULOCYTES NFR BLD AUTO: 0.6 % — SIGNIFICANT CHANGE UP (ref 0–1.5)
INR BLD: 0.97 — SIGNIFICANT CHANGE UP (ref 0.88–1.17)
KETONES UR-MCNC: 40 — SIGNIFICANT CHANGE UP
KETONES UR-MCNC: NORMAL
LDH SERPL L TO P-CCNC: 163 U/L — SIGNIFICANT CHANGE UP (ref 135–225)
LEUKOCYTE ESTERASE UR QL STRIP: NORMAL
LEUKOCYTE ESTERASE UR-ACNC: SIGNIFICANT CHANGE UP
LYMPHOCYTES # BLD AUTO: 17.8 % — SIGNIFICANT CHANGE UP (ref 13–44)
LYMPHOCYTES # BLD AUTO: 2.06 K/UL — SIGNIFICANT CHANGE UP (ref 1–3.3)
MCHC RBC-ENTMCNC: 27.2 PG — SIGNIFICANT CHANGE UP (ref 27–34)
MCHC RBC-ENTMCNC: 31.6 % — LOW (ref 32–36)
MCV RBC AUTO: 86.3 FL — SIGNIFICANT CHANGE UP (ref 80–100)
MONOCYTES # BLD AUTO: 0.54 K/UL — SIGNIFICANT CHANGE UP (ref 0–0.9)
MONOCYTES NFR BLD AUTO: 4.7 % — SIGNIFICANT CHANGE UP (ref 2–14)
NEUTROPHILS # BLD AUTO: 8.83 K/UL — HIGH (ref 1.8–7.4)
NEUTROPHILS NFR BLD AUTO: 76.3 % — SIGNIFICANT CHANGE UP (ref 43–77)
NITRITE UR QL STRIP: NORMAL
NITRITE UR-MCNC: NEGATIVE — SIGNIFICANT CHANGE UP
NRBC # FLD: 0.05 K/UL — SIGNIFICANT CHANGE UP (ref 0–0)
PH UR STRIP: 7
PH UR: 6 — SIGNIFICANT CHANGE UP (ref 5–8)
PLATELET # BLD AUTO: 195 K/UL — SIGNIFICANT CHANGE UP (ref 150–400)
PMV BLD: 12.5 FL — SIGNIFICANT CHANGE UP (ref 7–13)
POTASSIUM SERPL-MCNC: 4.2 MMOL/L — SIGNIFICANT CHANGE UP (ref 3.5–5.3)
POTASSIUM SERPL-SCNC: 4.2 MMOL/L — SIGNIFICANT CHANGE UP (ref 3.5–5.3)
PROT SERPL-MCNC: 6.5 G/DL — SIGNIFICANT CHANGE UP (ref 6–8.3)
PROT UR STRIP-MCNC: NORMAL
PROT UR-MCNC: 12.8 MG/DL — SIGNIFICANT CHANGE UP
PROT UR-MCNC: 20 — SIGNIFICANT CHANGE UP
PROTHROM AB SERPL-ACNC: 11.1 SEC — SIGNIFICANT CHANGE UP (ref 9.8–13.1)
RBC # BLD: 4.15 M/UL — SIGNIFICANT CHANGE UP (ref 3.8–5.2)
RBC # FLD: 15.2 % — HIGH (ref 10.3–14.5)
SODIUM SERPL-SCNC: 138 MMOL/L — SIGNIFICANT CHANGE UP (ref 135–145)
SP GR SPEC: 1.03 — SIGNIFICANT CHANGE UP (ref 1–1.04)
SP GR UR STRIP: 1.02
URATE SERPL-MCNC: 4.8 MG/DL — SIGNIFICANT CHANGE UP (ref 2.5–7)
UROBILINOGEN FLD QL: NORMAL — SIGNIFICANT CHANGE UP
WBC # BLD: 11.56 K/UL — HIGH (ref 3.8–10.5)
WBC # FLD AUTO: 11.56 K/UL — HIGH (ref 3.8–10.5)
WBC UR QL: SIGNIFICANT CHANGE UP (ref 0–?)

## 2019-11-26 PROCEDURE — 0502F SUBSEQUENT PRENATAL CARE: CPT

## 2019-11-26 PROCEDURE — 99202 OFFICE O/P NEW SF 15 MIN: CPT

## 2019-11-26 PROCEDURE — 59025 FETAL NON-STRESS TEST: CPT | Mod: 26

## 2019-11-26 PROCEDURE — 76818 FETAL BIOPHYS PROFILE W/NST: CPT | Mod: 26

## 2019-11-26 NOTE — OB PROVIDER TRIAGE NOTE - NSOBPROVIDERNOTE_OBGYN_ALL_OB_FT
Dr. De La Cruz's pt. is a 28y/o   EGA 33.6wks. Pt. was sent if from MD's office for elevated blood pressures (140/90s) and decrease fetal movement throughout the day. Pt. states she had good fetal movement while in triage. Pt. also reports of a headache this morning which was relieved with Tylenol. Pt. denies a headache currently, visual changes, epigastric/RUQ pain. Pt. denies abdominal contractions, VB, and LOF.    AP: Denies  Medical Hx: Migraines  Surgical Hx: Denies  OBGYN Hx:  FT 2015 7-9  Meds: PNV, magnesium (for migraines)  NKDA    Assessment/Plan:  BP:122/75, 125/62, 114/60, 113/57, 121/65, 109/59, 125/57  TAS: Cephalic presentation, anterior placenta, BERT:16, EFW: 2878gm, BPP:8/8  FHR: 135bpm, moderate variability, accels, no decels  1 contraction in 1.5hrs                         11.3   11.56 )-----------( 195      ( 2019 18:28 )             35.8   11-    138  |  104  |  10  ----------------------------<  122<H>  4.2   |  22  |  0.54    Ca    9.4      2019 18:28    TPro  6.5  /  Alb  3.6  /  TBili  0.4  /  DBili  x   /  AST  11  /  ALT  10  /  AlkPhos  73  -    PT:  PTT:  INR:  Fibrinogen:    Uric Acid:  LDH:    P:C Ratio:    Urinalysis Basic - ( 2019 18:28 )    Color: YELLOW / Appearance: HAZY / S.026 / pH: 6.0  Gluc: NEGATIVE / Ketone: 40  / Bili: NEGATIVE / Urobili: NORMAL   Blood: NEGATIVE / Protein: 20 / Nitrite: NEGATIVE   Leuk Esterase: SMALL / RBC: x / WBC 2-5   Sq Epi: x / Non Sq Epi: SMALL / Bacteria: SMALL Dr. De La Cruz's pt. is a 28y/o   EGA 33.6wks. Pt. was sent if from MD's office for elevated blood pressures (140/90s) and decrease fetal movement throughout the day. Pt. states she had good fetal movement while in triage. Pt. also reports of a headache this morning which was relieved with Tylenol. Pt. denies a headache currently, visual changes, epigastric/RUQ pain. Pt. denies abdominal contractions, VB, and LOF.    AP: Denies  Medical Hx: Migraines  Surgical Hx: Denies  OBGYN Hx:  FT 2015 7-9  Meds: PNV, magnesium (for migraines)  NKDA    Assessment/Plan:  BP:122/75, 125/62, 114/60, 113/57, 121/65, 109/59, 125/57  TAS: Cephalic presentation, anterior placenta, BERT:16, EFW: 2878gm, BPP:8/8  FHR: 135bpm, moderate variability, accels, no decels  1 contraction in 1.5hrs                         11.3   11.56 )-----------( 195      ( 2019 18:28 )             35.8       138  |  104  |  10  ----------------------------<  122<H>  4.2   |  22  |  0.54    Ca    9.4      2019 18:28    TPro  6.5  /  Alb  3.6  /  TBili  0.4  /  DBili  x   /  AST  11  /  ALT  10  /  AlkPhos  73  -    PT: 11.1  INR: 0.97  PTT: 26.4  Fibrinogen: 658    Uric Acid: 4.8  LDH:163    P:C Ratio: 0.09    Urinalysis Basic - ( 2019 18:28 )    Color: YELLOW / Appearance: HAZY / S.026 / pH: 6.0  Gluc: NEGATIVE / Ketone: 40  / Bili: NEGATIVE / Urobili: NORMAL   Blood: NEGATIVE / Protein: 20 / Nitrite: NEGATIVE   Leuk Esterase: SMALL / RBC: x / WBC 2-5   Sq Epi: x / Non Sq Epi: SMALL / Bacteria: SMALL Dr. De La Cruz's pt. is a 30y/o   EGA 33.6wks. Pt. was sent if from MD's office for elevated blood pressures (140/90s) and decrease fetal movement throughout the day. Pt. states she had good fetal movement while in triage. Pt. also reports of a headache this morning which was relieved with Tylenol. Pt. denies a headache currently, visual changes, epigastric/RUQ pain. Pt. denies abdominal contractions, VB, and LOF.    AP: Denies  Medical Hx: Migraines  Surgical Hx: Denies  OBGYN Hx:  FT 2015 7-9  Meds: PNV, magnesium (for migraines)  NKDA    Assessment/Plan:  BP:122/75, 125/62, 114/60, 113/57, 121/65, 109/59, 125/57  TAS: Cephalic presentation, anterior placenta, BERT:16, EFW: 2878gm, BPP:8/8  FHR: 135bpm, moderate variability, accels, no decels  1 contraction in 1.5hrs                         11.3   11.56 )-----------( 195      ( 2019 18:28 )             35.8       138  |  104  |  10  ----------------------------<  122<H>  4.2   |  22  |  0.54    Ca    9.4      2019 18:28    TPro  6.5  /  Alb  3.6  /  TBili  0.4  /  DBili  x   /  AST  11  /  ALT  10  /  AlkPhos  73  -    PT: 11.1  INR: 0.97  PTT: 26.4  Fibrinogen: 658    Uric Acid: 4.8  LDH:163    P:C Ratio: 0.09    Urinalysis Basic - ( 2019 18:28 )    Color: YELLOW / Appearance: HAZY / S.026 / pH: 6.0  Gluc: NEGATIVE / Ketone: 40  / Bili: NEGATIVE / Urobili: NORMAL   Blood: NEGATIVE / Protein: 20 / Nitrite: NEGATIVE   Leuk Esterase: SMALL / RBC: x / WBC 2-5   Sq Epi: x / Non Sq Epi: SMALL / Bacteria: SMALL    No evidence of preeclampsia at this time. Discussed findings with Dr. Gilmore. Pt. d/c'd home with 24hr urine and to follow up with Dr. De La Cruz next week. Pt. instructed to return to triage with increase abdominal contractions, LOF, VB, headache, visual changes, and epigastric/RUQ pain. Increase PO hydration encouraged.

## 2019-11-26 NOTE — OB PROVIDER TRIAGE NOTE - ADDITIONAL INSTRUCTIONS
No evidence of preeclampsia at this time. Discussed findings with Dr. Gilmore. Pt. d/c'd home with 24hr urine and to follow up with Dr. De La Cruz next week. Pt. instructed to return to triage with increase abdominal contractions, LOF, VB, headache, visual changes, and epigastric/RUQ pain. Increase PO hydration encouraged.

## 2019-11-26 NOTE — OB PROVIDER TRIAGE NOTE - NS_FINALEDD_OBGYN_ALL_OB_DT
Elyssa Hernandez is a 53 y.o. year old  who presents for annual exam.  S/P LSH / BSO 2006.  Not on ERT.  No bleeding. Hot flashes and sweats have essentially resolved.  Denies recent changes in her medical / surgical history.  No GYN complaints.  Mammogram scheduled 19.    Pap 18: Negative    Past Medical History:   Diagnosis Date    Breast disorder     Thyroid disease        Past Surgical History:   Procedure Laterality Date    BREAST BIOPSY Left     core    COLONOSCOPY N/A 2014    Performed by Jad Hill MD at [facility] ([facility])    HYSTERECTOMY  2006    LYMPHADENECTOMY      OOPHORECTOMY Left 2006    TEMPOROMANDIBULAR JOINT SURGERY         OB History        0    Para        Term                AB        Living           SAB        TAB        Ectopic        Multiple        Live Births                     ROS:  GENERAL: Feeling well overall.   SKIN: Denies rash or lesions.   HEAD: Denies head injury or headache.   NODES: Denies enlarged lymph nodes.   CHEST: Denies chest pain or shortness of breath.   CARDIOVASCULAR: Denies palpitations or left sided chest pain.   ABDOMEN: No abdominal pain, nausea, vomiting or rectal bleeding.   URINARY: No dysuria or hematuria.  REPRODUCTIVE: See HPI.   BREASTS: Denies pain, lumps, or nipple discharge.   HEMATOLOGIC: No easy bruisability or excessive bleeding.   MUSCULOSKELETAL: Denies joint pain or swelling.   NEUROLOGIC: Denies syncope or weakness.   PSYCHIATRIC: Denies depression.    PE:  (chaperone present during entire exam)  APPEARANCE: Well nourished, well developed, in no acute distress.  NODES: No inguinal lymph node enlargement.  ABDOMEN: Soft. No tenderness or masses. No hernias.  BREASTS: Symmetrical, no skin changes or visible lesions. No palpable masses, nipple discharge or adenopathy bilaterally.  PELVIC: Normal external female genitalia without lesions. Normal hair distribution. Adequate perineal body, normal  urethral meatus. Vagina without lesions or discharge. No significant cystocele or rectocele. Cervix without lesions.   Uterus and cervix surgically absent. Bimanual exam revealed no masses, tenderness or abnormality.  ANUS: Normal.    Diagnosis:  1. Well woman exam with routine gynecological exam    2. Postmenopausal status    3. History of hysterectomy, supracervical    4. Visit for screening mammogram          PLAN:         Patient was counseled today on postmenopausal issues.  Mammogram 8/22/19.    Follow-up in 1 year.   08-Jan-2020

## 2019-11-26 NOTE — OB RN TRIAGE NOTE - NSNURSINGINSTR_OBGYN_ALL_OB_FT
pt evaluated, pt d/c to home with written and verbal instructions, 24 hour urine collection to start.

## 2019-11-26 NOTE — OB RN TRIAGE NOTE - CHIEF COMPLAINT QUOTE
"I was sent from the MD office for elevated BP, headaches and nausea and cramping and decreased fetal movement since yesterday." "I was sent from the MD office for elevated BP, headaches and nausea and cramping and decreased fetal movement since yesterday."  history of migraines "I was sent from the MD office for elevated BP, headaches-no headache at present after taking tylenol earlier, nausea and cramping and decreased fetal movement since yesterday."  history of migraines

## 2019-11-26 NOTE — OB PROVIDER TRIAGE NOTE - HISTORY OF PRESENT ILLNESS
Dr. De La Cruz's pt. is a 30y/o   EGA 33.6wks. Pt. was sent if from MD's office for elevated blood pressures (140/90s) and decrease fetal movement throughout the day. Pt. states she had good fetal movement while in triage. Pt. also reports of a headache this morning which was relieved with Tylenol. Pt. denies a headache currently, visual changes, epigastric/RUQ pain. Pt. denies abdominal contractions, VB, and LOF.

## 2019-12-10 ENCOUNTER — APPOINTMENT (OUTPATIENT)
Dept: OBGYN | Facility: CLINIC | Age: 29
End: 2019-12-10
Payer: MEDICAID

## 2019-12-10 ENCOUNTER — NON-APPOINTMENT (OUTPATIENT)
Age: 29
End: 2019-12-10

## 2019-12-10 VITALS
WEIGHT: 293 LBS | BODY MASS INDEX: 41.95 KG/M2 | HEIGHT: 70 IN | SYSTOLIC BLOOD PRESSURE: 112 MMHG | DIASTOLIC BLOOD PRESSURE: 70 MMHG

## 2019-12-10 DIAGNOSIS — Z3A.31 31 WEEKS GESTATION OF PREGNANCY: ICD-10-CM

## 2019-12-10 DIAGNOSIS — Z3A.34 34 WEEKS GESTATION OF PREGNANCY: ICD-10-CM

## 2019-12-10 PROCEDURE — 36415 COLL VENOUS BLD VENIPUNCTURE: CPT

## 2019-12-10 PROCEDURE — 0502F SUBSEQUENT PRENATAL CARE: CPT

## 2019-12-11 LAB
BASOPHILS # BLD AUTO: 0.02 K/UL
BASOPHILS NFR BLD AUTO: 0.2 %
EOSINOPHIL # BLD AUTO: 0.02 K/UL
EOSINOPHIL NFR BLD AUTO: 0.2 %
HCT VFR BLD CALC: 37.1 %
HGB BLD-MCNC: 11.7 G/DL
HIV1+2 AB SPEC QL IA.RAPID: NONREACTIVE
IMM GRANULOCYTES NFR BLD AUTO: 0.5 %
LYMPHOCYTES # BLD AUTO: 1.69 K/UL
LYMPHOCYTES NFR BLD AUTO: 15.8 %
MAN DIFF?: NORMAL
MCHC RBC-ENTMCNC: 27.9 PG
MCHC RBC-ENTMCNC: 31.5 GM/DL
MCV RBC AUTO: 88.3 FL
MONOCYTES # BLD AUTO: 0.57 K/UL
MONOCYTES NFR BLD AUTO: 5.3 %
NEUTROPHILS # BLD AUTO: 8.38 K/UL
NEUTROPHILS NFR BLD AUTO: 78 %
PLATELET # BLD AUTO: 181 K/UL
RBC # BLD: 4.2 M/UL
RBC # FLD: 15.9 %
WBC # FLD AUTO: 10.73 K/UL

## 2019-12-12 ENCOUNTER — NON-APPOINTMENT (OUTPATIENT)
Age: 29
End: 2019-12-12

## 2019-12-12 LAB
GP B STREP DNA SPEC QL NAA+PROBE: DETECTED
GP B STREP DNA SPEC QL NAA+PROBE: NORMAL
SOURCE GBS: NORMAL

## 2019-12-17 ENCOUNTER — NON-APPOINTMENT (OUTPATIENT)
Age: 29
End: 2019-12-17

## 2019-12-17 ENCOUNTER — APPOINTMENT (OUTPATIENT)
Dept: OBGYN | Facility: CLINIC | Age: 29
End: 2019-12-17
Payer: MEDICAID

## 2019-12-17 VITALS
SYSTOLIC BLOOD PRESSURE: 126 MMHG | BODY MASS INDEX: 41.95 KG/M2 | DIASTOLIC BLOOD PRESSURE: 80 MMHG | HEIGHT: 70 IN | WEIGHT: 293 LBS

## 2019-12-17 DIAGNOSIS — Z3A.36 36 WEEKS GESTATION OF PREGNANCY: ICD-10-CM

## 2019-12-17 DIAGNOSIS — E66.01 OBESITY COMPLICATING PREGNANCY, THIRD TRIMESTER: ICD-10-CM

## 2019-12-17 DIAGNOSIS — O99.213 OBESITY COMPLICATING PREGNANCY, THIRD TRIMESTER: ICD-10-CM

## 2019-12-17 LAB
BILIRUB UR QL STRIP: NORMAL
BILIRUB UR QL STRIP: NORMAL
GLUCOSE UR-MCNC: NORMAL
GLUCOSE UR-MCNC: NORMAL
HCG UR QL: 0.2 EU/DL
HCG UR QL: 0.2 EU/DL
HGB UR QL STRIP.AUTO: NORMAL
HGB UR QL STRIP.AUTO: NORMAL
KETONES UR-MCNC: 15
KETONES UR-MCNC: 15
LEUKOCYTE ESTERASE UR QL STRIP: NORMAL
LEUKOCYTE ESTERASE UR QL STRIP: NORMAL
NITRITE UR QL STRIP: NORMAL
NITRITE UR QL STRIP: NORMAL
PH UR STRIP: 5.5
PH UR STRIP: 5.5
PROT UR STRIP-MCNC: NORMAL
PROT UR STRIP-MCNC: NORMAL
SP GR UR STRIP: 1.03
SP GR UR STRIP: 1.03

## 2019-12-17 PROCEDURE — 0502F SUBSEQUENT PRENATAL CARE: CPT

## 2019-12-20 ENCOUNTER — ASOB RESULT (OUTPATIENT)
Age: 29
End: 2019-12-20

## 2019-12-20 ENCOUNTER — APPOINTMENT (OUTPATIENT)
Dept: ANTEPARTUM | Facility: CLINIC | Age: 29
End: 2019-12-20
Payer: MEDICAID

## 2019-12-20 PROCEDURE — 76816 OB US FOLLOW-UP PER FETUS: CPT

## 2019-12-20 PROCEDURE — 0502F SUBSEQUENT PRENATAL CARE: CPT

## 2019-12-20 PROCEDURE — 76819 FETAL BIOPHYS PROFIL W/O NST: CPT

## 2019-12-21 ENCOUNTER — OUTPATIENT (OUTPATIENT)
Dept: OUTPATIENT SERVICES | Facility: HOSPITAL | Age: 29
LOS: 1 days | Discharge: HOME | End: 2019-12-21
Payer: MEDICAID

## 2019-12-21 VITALS — HEART RATE: 105 BPM | OXYGEN SATURATION: 96 %

## 2019-12-21 VITALS
RESPIRATION RATE: 14 BRPM | TEMPERATURE: 99 F | SYSTOLIC BLOOD PRESSURE: 113 MMHG | HEART RATE: 131 BPM | DIASTOLIC BLOOD PRESSURE: 56 MMHG

## 2019-12-21 DIAGNOSIS — Z3A.00 WEEKS OF GESTATION OF PREGNANCY NOT SPECIFIED: ICD-10-CM

## 2019-12-21 DIAGNOSIS — O26.899 OTHER SPECIFIED PREGNANCY RELATED CONDITIONS, UNSPECIFIED TRIMESTER: ICD-10-CM

## 2019-12-21 PROCEDURE — 99213 OFFICE O/P EST LOW 20 MIN: CPT | Mod: 25

## 2019-12-21 PROCEDURE — 76815 OB US LIMITED FETUS(S): CPT | Mod: 26

## 2019-12-21 PROCEDURE — 93010 ELECTROCARDIOGRAM REPORT: CPT

## 2019-12-21 NOTE — OB RN TRIAGE NOTE - PMH
No pertinent past medical history    Normal vaginal delivery  2015  FT (M) 7#9  Termination of pregnancy (fetus)

## 2019-12-21 NOTE — OB PROVIDER TRIAGE NOTE - NSOBPROVIDERNOTE_OBGYN_ALL_OB_FT
30 y/o  @ 37.3 wks gest presents with c/o painful uterine ctxns every 4-5 minutes since 0300 denies any LOF or VB reports +FM denies any n/v/d denies any fever or chills ap care uncomplicated thus far        abdomen: soft, nt on palp  SVE: 0.5/50/-3  T(C): 37 (19 @ 09:42), Max: 37 (19 @ 09:42)  HR: 107 (19 @ 10:26) (101 - 131)  BP: 113/56 (19 @ 10:10) (113/56 - 113/56)  RR: 14 (19 @ 09:42) (14 - 14)  SpO2: 97% (19 @ 10:26) (97% - 97%)  NST in progress    NKA  med hx: denies  surg hx:  D&C x's 1  gyn hx: denies  ob hx:   ETOP x's 1  2015  FT 7#9, no comp  meds: PNV 30 y/o  @ 37.3 wks gest presents with c/o painful uterine ctxns every 4-5 minutes since 0300 denies any LOF or VB reports +FM denies any n/v/d denies any fever or chills ap care uncomplicated thus far        abdomen: soft, nt on palp  SVE: 0.5/50/-3  T(C): 37 (19 @ 09:42), Max: 37 (19 @ 09:42)  HR: 107 (19 @ 10:26) (101 - 131)  BP: 113/56 (19 @ 10:10) (113/56 - 113/56)  RR: 14 (19 @ 09:42) (14 - 14)  SpO2: 97% (19 @ 10:26) (97% - 97%)  NST in progress    NKA  med hx: denies  surg hx:  D&C x's 1  gyn hx: denies  ob hx:   ETOP x's 1  2015  FT 7#9, no comp  meds: PNV      addendum @ 1135    cat 1 FHT  toco: irreg       TAS: BPP;  vtx anterior placenta BERT: 11.14      no evidence of active labor   pt comfortable   maternal and fetal status reassuring   d/w dr tse   discharge home  labor precautions d/w pt  increase fluid intake  instructed on fetal kickcounts  follow up with dr riggins as sched   w/v discharge instructions given  discharged home

## 2019-12-21 NOTE — OB PROVIDER TRIAGE NOTE - ADDITIONAL INSTRUCTIONS
no evidence of active labor   pt comfortable   maternal and fetal status reassuring   d/w dr tse   discharge home  labor precautions d/w pt  increase fluid intake  instructed on fetal kickcounts  follow up with dr riggins as sched 12/23  w/v discharge instructions given  discharged home

## 2019-12-23 ENCOUNTER — NON-APPOINTMENT (OUTPATIENT)
Age: 29
End: 2019-12-23

## 2019-12-23 ENCOUNTER — APPOINTMENT (OUTPATIENT)
Dept: OBGYN | Facility: CLINIC | Age: 29
End: 2019-12-23
Payer: MEDICAID

## 2019-12-23 VITALS
SYSTOLIC BLOOD PRESSURE: 120 MMHG | WEIGHT: 293 LBS | DIASTOLIC BLOOD PRESSURE: 80 MMHG | BODY MASS INDEX: 41.95 KG/M2 | HEIGHT: 70 IN

## 2019-12-23 PROCEDURE — 0502F SUBSEQUENT PRENATAL CARE: CPT

## 2019-12-30 ENCOUNTER — NON-APPOINTMENT (OUTPATIENT)
Age: 29
End: 2019-12-30

## 2019-12-30 ENCOUNTER — APPOINTMENT (OUTPATIENT)
Dept: OBGYN | Facility: CLINIC | Age: 29
End: 2019-12-30
Payer: MEDICAID

## 2019-12-30 VITALS
DIASTOLIC BLOOD PRESSURE: 80 MMHG | BODY MASS INDEX: 41.95 KG/M2 | HEIGHT: 70 IN | WEIGHT: 293 LBS | SYSTOLIC BLOOD PRESSURE: 124 MMHG

## 2019-12-30 LAB
BILIRUB UR QL STRIP: NORMAL
GLUCOSE UR-MCNC: NORMAL
HCG UR QL: 0.2 EU/DL
HGB UR QL STRIP.AUTO: NORMAL
KETONES UR-MCNC: NORMAL
LEUKOCYTE ESTERASE UR QL STRIP: NORMAL
NITRITE UR QL STRIP: NORMAL
PH UR STRIP: 6.5
PROT UR STRIP-MCNC: NORMAL
SP GR UR STRIP: 1.02

## 2019-12-30 PROCEDURE — 0502F SUBSEQUENT PRENATAL CARE: CPT

## 2019-12-31 ENCOUNTER — FORM ENCOUNTER (OUTPATIENT)
Age: 29
End: 2019-12-31

## 2020-01-01 ENCOUNTER — OUTPATIENT (OUTPATIENT)
Dept: INPATIENT UNIT | Facility: HOSPITAL | Age: 30
LOS: 1 days | Discharge: HOME | End: 2020-01-01
Payer: MEDICAID

## 2020-01-01 VITALS
HEART RATE: 105 BPM | SYSTOLIC BLOOD PRESSURE: 134 MMHG | RESPIRATION RATE: 18 BRPM | TEMPERATURE: 98 F | DIASTOLIC BLOOD PRESSURE: 86 MMHG

## 2020-01-01 VITALS — HEART RATE: 94 BPM | DIASTOLIC BLOOD PRESSURE: 64 MMHG | SYSTOLIC BLOOD PRESSURE: 125 MMHG

## 2020-01-01 DIAGNOSIS — O26.899 OTHER SPECIFIED PREGNANCY RELATED CONDITIONS, UNSPECIFIED TRIMESTER: ICD-10-CM

## 2020-01-01 DIAGNOSIS — Z3A.00 WEEKS OF GESTATION OF PREGNANCY NOT SPECIFIED: ICD-10-CM

## 2020-01-01 PROCEDURE — 71046 X-RAY EXAM CHEST 2 VIEWS: CPT | Mod: 26

## 2020-01-01 PROCEDURE — 99213 OFFICE O/P EST LOW 20 MIN: CPT | Mod: 25

## 2020-01-01 RX ORDER — CITRIC ACID/SODIUM CITRATE 300-500 MG
30 SOLUTION, ORAL ORAL ONCE
Refills: 0 | Status: COMPLETED | OUTPATIENT
Start: 2020-01-01 | End: 2020-01-01

## 2020-01-01 RX ORDER — OXYCODONE AND ACETAMINOPHEN 5; 325 MG/1; MG/1
1 TABLET ORAL ONCE
Refills: 0 | Status: DISCONTINUED | OUTPATIENT
Start: 2020-01-01 | End: 2020-01-01

## 2020-01-01 RX ADMIN — OXYCODONE AND ACETAMINOPHEN 1 TABLET(S): 5; 325 TABLET ORAL at 13:21

## 2020-01-01 RX ADMIN — Medication 30 MILLILITER(S): at 14:38

## 2020-01-01 NOTE — OB PROVIDER TRIAGE NOTE - ADDITIONAL INSTRUCTIONS
addendum @ 1630:  Dr Wang d/w radiology resident  preliminary report : liver obstructing view of ribs  Dr Nicole aware of report  maternal and fetal staus reassuring  likely musculoskeletal related / costochondritis  pt cleared for discharge home  pt reports 0/10 on pain scale  labor precautions d/w pt  increase fluid intake  instructed on fetal kickcounts   follow up with dr riggins as sched 1/6  w/v discharge instructions given  discharged home addendum @ 1630:  Dr Wang d/w radiology resident  preliminary report : liver obstructing view of ribs  Dr Nicole aware of report  maternal and fetal status reassuring  likely musculoskeletal related / costochondritis  pt cleared for discharge home  pt reports 0/10 on pain scale  labor precautions d/w pt  increase fluid intake  instructed on fetal kickcounts   rx: Percocet 1 tab po every 6 hours as needed dispense # 5   follow up with dr riggins as sched 1/6  w/v discharge instructions given  discharged home

## 2020-01-01 NOTE — OB PROVIDER TRIAGE NOTE - NSOBPROVIDERNOTE_OBGYN_ALL_OB_FT
30 y/o  @ 39 wks gest presents with c/o pain on her right rib cage since Monday and states since Monday the pain has intensified denies any SOB or chest pain denies any pain upon inhalation or exhalation  pt reports when she turns to her left side she experiences the pain or when she tries to sit up she experiences the pain 5-6/10 on pain scale pt reports she had a productive cough x's 1 week and last took Tylenol 1000 mg @ 1100 with " some" relief . denies any uc's vb or lof reports +FM ap care comp by:  pt reports on  EFW was 9#3 and is to follow up with dr De La Cruz on Monday for mode of delivery      abdomen: soft, nt on palp  SVE: closed/50/-3  T(C): 36.9 (20 @ 12:13), Max: 36.9 (20 @ 11:47)  HR: 125 (20 @ 13:15) (99 - 125)  BP: 132/80 (20 @ 13:21) (119/74 - 134/86)  RR: 18 (20 @ 11:47) (18 - 18)  SpO2: --  right rib tender upon palpation   NST in progress  warm pack applied to right rib       NKA  med hx: denies  surg hx: denies  gyn hx: denies  ob hx:   2015  FT 7#9 , no comp  meds:   PNV      ht: 5'10  wt: 297 lbs      plan of care d/w dr velasquez  likely musculoskeletal/ costochondritis  Percocet 1 tab po   will continue to monitor 30 y/o  @ 39 wks gest presents with c/o pain on her right rib cage since Monday and states since Monday the pain has intensified denies any SOB or chest pain denies any pain upon inhalation or exhalation  pt reports when she turns to her left side she experiences the pain or when she tries to sit up she experiences the pain 5-6/10 on pain scale pt reports she had a productive cough x's 1 week and last took Tylenol 1000 mg @ 1100 with " some" relief . denies any uc's vb or lof reports +FM ap care comp by:  pt reports on  EFW was 9#3 and is to follow up with dr De La Cruz on Monday for mode of delivery      abdomen: soft, nt on palp  SVE: closed/50/-3  T(C): 36.9 (20 @ 12:13), Max: 36.9 (20 @ 11:47)  HR: 125 (20 @ 13:15) (99 - 125)  BP: 132/80 (20 @ 13:21) (119/74 - 134/86)  RR: 18 (20 @ 11:47) (18 - 18)  SpO2: --  right rib tender upon palpation / point tenderness  NST in progress  warm pack applied to right rib       NKA  med hx: denies  surg hx: denies  gyn hx: denies  ob hx:   2015  FT 7#9 , no comp  meds:   PNV      ht: 5'10  wt: 297 lbs      plan of care d/w dr nicole  likely musculoskeletal/ costochondritis  Percocet 1 tab po   will continue to monitor      addendum @ 1330 :  Dr Nicole in to evaluate pt   pt desires to have x-ray to r/o broken rib   will continue to monitor 30 y/o  @ 39 wks gest presents with c/o pain on her right rib cage since Monday and states since Monday the pain has intensified denies any SOB or chest pain denies any pain upon inhalation or exhalation  pt reports when she turns to her left side she experiences the pain or when she tries to sit up she experiences the pain 5-6/10 on pain scale pt reports she had a productive cough x's 1 week and last took Tylenol 1000 mg @ 1100 with " some" relief . denies any uc's vb or lof reports +FM ap care comp by:  pt reports on  EFW was 9#3 and is to follow up with dr De La Cruz on Monday for mode of delivery      abdomen: soft, nt on palp  SVE: closed/50/-3  T(C): 36.9 (20 @ 12:13), Max: 36.9 (20 @ 11:47)  HR: 125 (20 @ 13:15) (99 - 125)  BP: 132/80 (20 @ 13:21) (119/74 - 134/86)  RR: 18 (20 @ 11:47) (18 - 18)  SpO2: --  right rib tender upon palpation / point tenderness  NST in progress  warm pack applied to right rib       NKA  med hx: denies  surg hx: denies  gyn hx: denies  ob hx:   2015  FT 7#9 , no comp  meds:   PNV      ht: 5'10  wt: 297 lbs      plan of care d/w dr nicole  likely musculoskeletal/ costochondritis  Percocet 1 tab po   will continue to monitor      addendum @ 1330 :  Dr Nicole in to evaluate pt   pt desires to have x-ray to r/o broken rib   will continue to monitor      addendum @ 1600:  pt reports "full" relief from Percocet   denies any discomfort at this time  cat 1 FHT  toco: irreg  pt to radiology for chest x-ray via wheelchair  will continue to monitor 30 y/o  @ 39 wks gest presents with c/o pain on her right rib cage since Monday and states since Monday the pain has intensified denies any SOB or chest pain denies any pain upon inhalation or exhalation  pt reports when she turns to her left side she experiences the pain or when she tries to sit up she experiences the pain 5-6/10 on pain scale pt reports she had a productive cough x's 1 week and last took Tylenol 1000 mg @ 1100 with " some" relief . denies any uc's vb or lof reports +FM ap care comp by:  pt reports on  EFW was 9#3 and is to follow up with dr De La Cruz on Monday for mode of delivery      abdomen: soft, nt on palp  SVE: closed/50/-3  T(C): 36.9 (20 @ 12:13), Max: 36.9 (20 @ 11:47)  HR: 125 (20 @ 13:15) (99 - 125)  BP: 132/80 (20 @ 13:21) (119/74 - 134/86)  RR: 18 (20 @ 11:47) (18 - 18)  SpO2: --  right rib tender upon palpation / point tenderness  NST in progress  warm pack applied to right rib       NKA  med hx: denies  surg hx: denies  gyn hx: denies  ob hx:   2015  FT 7#9 , no comp  meds:   PNV      ht: 5'10  wt: 297 lbs      plan of care d/w dr nicole  likely musculoskeletal/ costochondritis  Percocet 1 tab po   will continue to monitor      addendum @ 1330 :  Dr Nicole in to evaluate pt   pt desires to have x-ray to r/o broken rib   will continue to monitor      addendum @ 1600:  pt reports "full" relief from Percocet   denies any discomfort at this time  cat 1 FHT, FH tracing discontinuous 2/2 pt being uncomfortable   toco: irreg  pt to radiology for chest x-ray via wheelchair  will continue to monitor      addendum @ 1630:  Dr Wang d/w radiology resident  preliminary report : liver obstructing view of ribs  Dr Nicole aware of report  maternal and fetal staus reassuring  likely musculoskeletal related / costochondritis  pt cleared for discharge home  pt reports 0/10 on pain scale  labor precautions d/w pt  increase fluid intake  instructed on fetal kickcounts   follow up with dr de la cruz as sched   w/v discharge instructions given  discharged home 28 y/o  @ 39 wks gest presents with c/o pain on her right rib cage since Monday and states since Monday the pain has intensified denies any SOB or chest pain denies any pain upon inhalation or exhalation  pt reports when she turns to her left side she experiences the pain or when she tries to sit up she experiences the pain 5-6/10 on pain scale pt reports she had a productive cough x's 1 week and last took Tylenol 1000 mg @ 1100 with " some" relief . denies any uc's vb or lof reports +FM ap care comp by:  pt reports on  EFW was 9#3 and is to follow up with dr De La Cruz on Monday for mode of delivery      abdomen: soft, nt on palp  SVE: closed/50/-3  T(C): 36.9 (20 @ 12:13), Max: 36.9 (20 @ 11:47)  HR: 125 (20 @ 13:15) (99 - 125)  BP: 132/80 (20 @ 13:21) (119/74 - 134/86)  RR: 18 (20 @ 11:47) (18 - 18)  SpO2: --  right rib tender upon palpation / point tenderness  NST in progress  warm pack applied to right rib       NKA  med hx: denies  surg hx: denies  gyn hx: denies  ob hx:   2015  FT 7#9 , no comp  meds:   PNV      ht: 5'10  wt: 297 lbs      plan of care d/w dr nicole  likely musculoskeletal/ costochondritis  Percocet 1 tab po   will continue to monitor      addendum @ 1330 :  Dr Nicole in to evaluate pt   pt desires to have x-ray to r/o broken rib   will continue to monitor      addendum @ 1600:  pt reports "full" relief from Percocet   denies any discomfort at this time  cat 1 FHT, FH tracing discontinuous 2/2 pt being uncomfortable   toco: irreg  TAS: BPP: 8/8 vtx posterior placenta BERT: 10.5  pt to radiology for chest x-ray via wheelchair  will continue to monitor      addendum @ 1630:  Dr Wang d/w radiology resident  preliminary report : liver obstructing view of ribs  Dr Nicole aware of report  maternal and fetal staus reassuring  likely musculoskeletal related / costochondritis  pt cleared for discharge home  pt reports 0/10 on pain scale  labor precautions d/w pt  increase fluid intake  instructed on fetal kickcounts   follow up with dr de la cruz as sched   w/v discharge instructions given  discharged home

## 2020-01-01 NOTE — OB PROVIDER TRIAGE NOTE - NS_FETALHEARTHEAR_OBGYN_ALL_OB
Pressure injury noted to coccyx. Treatment ordered.       05/18/18 0930   Wound 05/18/18 0419 upper;midline coccyx pressure injury   Date first assessed/Time first assessed: 05/18/18 0419   Present On Admission : yes;picture taken  Orientation: upper;midline  Location: coccyx  Type: pressure injury  Stage, Pressure Injury: Stage 2   Dressing Appearance intact;moist drainage   Base clean;moist;other (see comments)  (Red)   Red (%), Wound Tissue Color 100   Periwound dry;intact;blanchable;other (see comments)  (scar tissue)   Periwound Temperature warm   Periwound Skin Turgor soft   Wound Length (cm) 1.5 cm   Wound Width (cm) 3 cm   Wound Depth (cm) 0.3 cm   Drainage Characteristics/Odor sanguineous   Drainage Amount small      Yes

## 2020-01-06 ENCOUNTER — NON-APPOINTMENT (OUTPATIENT)
Age: 30
End: 2020-01-06

## 2020-01-06 ENCOUNTER — APPOINTMENT (OUTPATIENT)
Dept: OBGYN | Facility: CLINIC | Age: 30
End: 2020-01-06
Payer: MEDICAID

## 2020-01-06 VITALS
BODY MASS INDEX: 41.95 KG/M2 | DIASTOLIC BLOOD PRESSURE: 78 MMHG | WEIGHT: 293 LBS | SYSTOLIC BLOOD PRESSURE: 114 MMHG | HEIGHT: 70 IN

## 2020-01-06 LAB
BILIRUB UR QL STRIP: NORMAL
GLUCOSE UR-MCNC: NORMAL
HCG UR QL: 0.2 EU/DL
HGB UR QL STRIP.AUTO: NORMAL
KETONES UR-MCNC: NORMAL
LEUKOCYTE ESTERASE UR QL STRIP: NORMAL
NITRITE UR QL STRIP: NORMAL
PH UR STRIP: 5.5
PROT UR STRIP-MCNC: 30
SP GR UR STRIP: 1.03

## 2020-01-06 PROCEDURE — 0502F SUBSEQUENT PRENATAL CARE: CPT

## 2020-01-07 ENCOUNTER — OUTPATIENT (OUTPATIENT)
Dept: OUTPATIENT SERVICES | Facility: HOSPITAL | Age: 30
LOS: 1 days | End: 2020-01-07

## 2020-01-07 ENCOUNTER — OUTPATIENT (OUTPATIENT)
Dept: INPATIENT UNIT | Facility: HOSPITAL | Age: 30
LOS: 1 days | Discharge: HOME | End: 2020-01-07
Payer: MEDICAID

## 2020-01-07 ENCOUNTER — TRANSCRIPTION ENCOUNTER (OUTPATIENT)
Age: 30
End: 2020-01-07

## 2020-01-07 VITALS
WEIGHT: 293 LBS | TEMPERATURE: 97 F | OXYGEN SATURATION: 98 % | DIASTOLIC BLOOD PRESSURE: 72 MMHG | HEIGHT: 68 IN | HEART RATE: 95 BPM | RESPIRATION RATE: 18 BRPM | SYSTOLIC BLOOD PRESSURE: 110 MMHG

## 2020-01-07 VITALS — SYSTOLIC BLOOD PRESSURE: 120 MMHG | HEART RATE: 102 BPM | DIASTOLIC BLOOD PRESSURE: 77 MMHG

## 2020-01-07 VITALS
DIASTOLIC BLOOD PRESSURE: 81 MMHG | RESPIRATION RATE: 16 BRPM | HEART RATE: 96 BPM | SYSTOLIC BLOOD PRESSURE: 124 MMHG | TEMPERATURE: 99 F

## 2020-01-07 DIAGNOSIS — O36.63X0 MATERNAL CARE FOR EXCESSIVE FETAL GROWTH, THIRD TRIMESTER, NOT APPLICABLE OR UNSPECIFIED: ICD-10-CM

## 2020-01-07 DIAGNOSIS — R06.02 SHORTNESS OF BREATH: ICD-10-CM

## 2020-01-07 DIAGNOSIS — Z01.818 ENCOUNTER FOR OTHER PREPROCEDURAL EXAMINATION: ICD-10-CM

## 2020-01-07 DIAGNOSIS — Z3A.00 WEEKS OF GESTATION OF PREGNANCY NOT SPECIFIED: ICD-10-CM

## 2020-01-07 DIAGNOSIS — O26.899 OTHER SPECIFIED PREGNANCY RELATED CONDITIONS, UNSPECIFIED TRIMESTER: ICD-10-CM

## 2020-01-07 DIAGNOSIS — O36.60X0 MATERNAL CARE FOR EXCESSIVE FETAL GROWTH, UNSPECIFIED TRIMESTER, NOT APPLICABLE OR UNSPECIFIED: ICD-10-CM

## 2020-01-07 LAB
AMNISURE ROM (RUPTURE OF MEMBRANES): NEGATIVE — SIGNIFICANT CHANGE UP
APPEARANCE UR: SIGNIFICANT CHANGE UP
BACTERIA # UR AUTO: SIGNIFICANT CHANGE UP
BILIRUB UR-MCNC: NEGATIVE — SIGNIFICANT CHANGE UP
BLD GP AB SCN SERPL QL: NEGATIVE — SIGNIFICANT CHANGE UP
BLOOD UR QL VISUAL: NEGATIVE — SIGNIFICANT CHANGE UP
COD CRY URNS QL: SIGNIFICANT CHANGE UP
COLOR SPEC: YELLOW — SIGNIFICANT CHANGE UP
EPI CELLS # UR: SIGNIFICANT CHANGE UP
GLUCOSE UR-MCNC: NEGATIVE — SIGNIFICANT CHANGE UP
HCT VFR BLD CALC: 37.9 % — SIGNIFICANT CHANGE UP (ref 34.5–45)
HGB BLD-MCNC: 12.2 G/DL — SIGNIFICANT CHANGE UP (ref 11.5–15.5)
KETONES UR-MCNC: SIGNIFICANT CHANGE UP
LEUKOCYTE ESTERASE UR-ACNC: HIGH
MCHC RBC-ENTMCNC: 27.7 PG — SIGNIFICANT CHANGE UP (ref 27–34)
MCHC RBC-ENTMCNC: 32.2 % — SIGNIFICANT CHANGE UP (ref 32–36)
MCV RBC AUTO: 85.9 FL — SIGNIFICANT CHANGE UP (ref 80–100)
NITRITE UR-MCNC: NEGATIVE — SIGNIFICANT CHANGE UP
NRBC # FLD: 0 K/UL — SIGNIFICANT CHANGE UP (ref 0–0)
PH UR: 6 — SIGNIFICANT CHANGE UP (ref 5–8)
PLATELET # BLD AUTO: 193 K/UL — SIGNIFICANT CHANGE UP (ref 150–400)
PMV BLD: 13 FL — SIGNIFICANT CHANGE UP (ref 7–13)
PROT UR-MCNC: 70 — SIGNIFICANT CHANGE UP
RBC # BLD: 4.41 M/UL — SIGNIFICANT CHANGE UP (ref 3.8–5.2)
RBC # FLD: 15.4 % — HIGH (ref 10.3–14.5)
RBC CASTS # UR COMP ASSIST: SIGNIFICANT CHANGE UP (ref 0–?)
RH IG SCN BLD-IMP: POSITIVE — SIGNIFICANT CHANGE UP
SP GR SPEC: 1.04 — SIGNIFICANT CHANGE UP (ref 1–1.04)
UROBILINOGEN FLD QL: NORMAL — SIGNIFICANT CHANGE UP
WBC # BLD: 9.8 K/UL — SIGNIFICANT CHANGE UP (ref 3.8–10.5)
WBC # FLD AUTO: 9.8 K/UL — SIGNIFICANT CHANGE UP (ref 3.8–10.5)
WBC UR QL: SIGNIFICANT CHANGE UP (ref 0–?)

## 2020-01-07 PROCEDURE — 99215 OFFICE O/P EST HI 40 MIN: CPT

## 2020-01-07 RX ORDER — MAGNESIUM CHLORIDE
0 CRYSTALS MISCELLANEOUS
Qty: 0 | Refills: 0 | DISCHARGE

## 2020-01-07 RX ORDER — CALCIUM CARBONATE 500(1250)
1 TABLET ORAL
Qty: 0 | Refills: 0 | DISCHARGE

## 2020-01-07 NOTE — OB PST NOTE - ASSESSMENT
30 yo female presents to PST unit with pre-op diagnosis of maternal care for excessive fetal growth, third trimester scheduled for primary  section on 2020.

## 2020-01-07 NOTE — OB PST NOTE - NSHPREVIEWOFSYSTEMS_GEN_ALL_CORE
General: No fever, chills, sweating, anorexia, weight loss or weight gain. No polyphagia, polyurea, polydypsia, malaise, or fatigue    Skin: No rashes, itching, or dryness. No change in size/color of moles. No tumors, brittle nails, pitted nails, or hair loss    Breast: No tenderness, lumps, or nipple discharge      Ophthalmologic: No diplopia, photophobia, lacrimation, blurred Vision , or eye discharge    ENMT Symptoms: No hearing difficulty, ear pain, tinnitus, or vertigo. No sinus symptoms, nasal congestion, nasal   discharge, or nasal obstruction    Respiratory and Thorax: No wheezing, dyspnea, cough, hemoptysis, or pleuritic chest pain     Cardiovascular: No chest pain, palpitations, dyspnea on exertion, orthopnea, paroxysmal nocturnal dyspnea,   peripheral edema, or claudication    Gastrointestinal: nausea & vomiting with pregnancy, heartburn,  diarrhea, constipation, change in bowel habits, flatulence, abdominal pain, or melena    Genitourinary/ Pelvis: No hematuria, renal colic, or flank pain.  No urine discoloration, incontinence, dysuria, or urinary hesitancy. Normal urinary frequency. No nocturia, abnormal vaginal bleeding, vaginal discharge, spotting, pelvic pain, or vaginal leakage    Musculoskeletal: Pain in the right upper chest wall due to recent coughing -improved with percocet, arthritis, joint swelling, muscle cramping, muscle weakness, neck pain, arm pain, or leg pain    Neurological: No transient paralysis, weakness, paresthesias, or seizures. No syncope, tremors, vertigo, loss of sensation, difficulty walking, loss of consciousness, hemiparesis, confusion, or facial palsy    Psychiatric: No suicidal ideation, depression, anxiety, insomnia, memory loss, paranoia, mood swings, agitation, hallucinations, or hyperactivity    Hematology: No gum bleeding, nose bleeding, or skin lumps    Lymphatic: No enlarged or tender lymph nodes. No extremity swelling    Endocrine: No heat or cold intolerance    Immunologic: No recurrent or persistent infections

## 2020-01-07 NOTE — OB PROVIDER TRIAGE NOTE - NS_RISKASSESSMENT_OBGYN_ALL_OB
Mastoid Interpolation Flap Division And Inset Text: Division and inset of the mastoid interpolation flap was performed to achieve optimal aesthetic result, restore normal anatomic appearance and avoid distortion of normal anatomy, expedite and facilitate wound healing, achieve optimal functional result and because linear closure either not possible or would produce suboptimal result. The patient was prepped and draped in the usual manner. The pedicle was infiltrated with local anesthesia. The pedicle was sectioned with a #15 blade. The pedicle was de-bulked and trimmed to match the shape of the defect. Hemostasis was achieved. The flap donor site and free margin of the flap were secured with deep buried sutures and the wound edges were re-approximated. No

## 2020-01-07 NOTE — OB PST NOTE - PMH
Maternal care for excessive fetal growth    No pertinent past medical history    Normal vaginal delivery  2015  FT (M) 7#9  Termination of pregnancy (fetus)

## 2020-01-07 NOTE — OB PST NOTE - PROBLEM SELECTOR PLAN 2
Discussed with Dr. Arroyo.  EKG printed in chart.   Advised pt. to follow up with PCP after delivery.

## 2020-01-07 NOTE — OB PST NOTE - HISTORY OF PRESENT ILLNESS
28 yo female presents to PST unit with pre-op diagnosis of maternal care for excessive fetal growth, third trimester scheduled for primary  section on 2020. PARVEEN 2020. She reports positive fetal movement but denies loss of fluid or vaginal bleeding. She reports infrequent uterine contractions.  Indication for  section is macrosomia.

## 2020-01-07 NOTE — OB PROVIDER TRIAGE NOTE - NSHPPHYSICALEXAM_GEN_ALL_CORE
Speculum: Negative pooling, equivocal nitrazine, negative fern  SVE: 1/20/-3  TAS: Cephalic presentation, anterior placenta, BERT:10.3, BPP:8/8  NST in progress

## 2020-01-07 NOTE — OB PST NOTE - NSHPPHYSICALEXAM_GEN_ALL_CORE
Constitutional: Well Developed, Well Groomed, Well Nourished, No Distress    Eyes: PERRL, EOMI, conjunctiva clear    Ears: Normal    Mouth & Gums: Normal, moist    Pharynx: No tenderness, discharge, or peritonsillar abscess    Tonsils: No Redness, discharge, tenderness, or swelling    Neck: Supple, no JVD, normal thyroid glands, no carotid bruits, no cervical vertebral or paraspinal tenderness    Breast: Normal shape, no masses, no tenderness, nipples normal, no nipple discharge    Back: Normal shape, ROM intact, strength intact, no vertebral tenderness    Respiratory: Airway patent, breath sounds equal, good air movement, respiration non-labored, clear to auscultation bilateral, no chest wall tenderness, no intercostal retractions, no rales, no wheezes, no rhonchi, no subcutaneous emphysema    Cardiovascular:  Regular rate and rhythm, no rubs or murmur, normal PMI    Gastrointestinal: Gravid abdomen     Extremities: No clubbing, cyanosis, or pedal edema    Vascular:  Carotid Pulse normal , Radial Pulse normal, Femoral Pulse normal, DP pulse normal, PT pulse normal    Neurological: alert & oriented x 3, sensation intact, deep reflexes intact, cranial nerve intact, normal strength    Skin: warm and dry, normal color    Lymph Nodes: normal posterior cervical lymph node, normal anterior cervical lymph node, normal supraclavicular lymph node, normal axillary lymph node, normal inguinal lymph node, normal femoral lymph node    Musculoskeletal: ROM intact, no joint swelling, warmth, or calf tenderness. Normal strength    Psychiatric: normal affect, normal behavior

## 2020-01-07 NOTE — OB PST NOTE - PROBLEM SELECTOR PLAN 1
scheduled for primary  section on 2020.  Verbal and written pre-op instructions provided to patient. Patient verbalized understanding. ]  patient given verbal and written instruction with teach back on chlorhexidine shampoo, and the patient verbalized understanding with return demonstration.   No food after midnight.  Pending labs T&S, CBC, RPR, UA

## 2020-01-07 NOTE — OB PST NOTE - NSANTHOSAYNRD_GEN_A_CORE
No. EUN screening performed.  STOP BANG Legend: 0-2 = LOW Risk; 3-4 = INTERMEDIATE Risk; 5-8 = HIGH Risk

## 2020-01-07 NOTE — OB PROVIDER TRIAGE NOTE - HISTORY OF PRESENT ILLNESS
Dr. De La Cruz's pt. is a 30y/o  EGA 39.6wks  pt reports of leakage of clear fluid around 1845 and irregular contractions. Pt. denies vaginal bleeding. Pt. reports good fetal movement. Pt. is scheduled for elective  tomorrow at 9am for LGA.

## 2020-01-07 NOTE — OB PROVIDER TRIAGE NOTE - NSOBPROVIDERNOTE_OBGYN_ALL_OB_FT
Dr. De La Cruz's pt. is a 30y/o  EGA 39.6wks  pt reports of leakage of clear fluid around 1845 and irregular contractions. Pt. denies vaginal bleeding. Pt. reports good fetal movement. Pt. is scheduled for elective  tomorrow at 9am for LGA.     AP: Denies  Medical/surgical Hx: Denies  OBGYN Hx:   2015 7-9  TOP [facility]  Meds:PNV  NKDA    Assessment/Plan  NPO since  "few pieces of chicken"  Speculum: Negative pooling, equivocal nitrazine, negative fern  SVE: /-3  TAS: Cephalic presentation, anterior placenta, BERT:10.3, BPP:8/8  FHR: 145bpm, moderate variability, accels, no decels  Chris irregularly Dr. De La Cruz's pt. is a 30y/o  EGA 39.6wks  pt reports of leakage of clear fluid around 1845 and irregular contractions. Pt. denies vaginal bleeding. Pt. reports good fetal movement. Pt. is scheduled for elective  tomorrow at 9am for LGA.     AP: Denies  Medical/surgical Hx: Denies  OBGYN Hx:   2015 7-9  TOP   Meds:PNV  NKDA    Assessment/Plan  NPO since  "few pieces of chicken"  Speculum: Negative pooling, equivocal nitrazine, negative fern  SVE: /3  TAS: Cephalic presentation, anterior placenta, BERT:10.3, BPP:8/8  FHR: 145bpm, moderate variability, accels, no decels  Chris irregularly  Amnisure Neg.     No evidence of rupture of membranes. Discussed findings with Dr. Obrien. Pt. d/c'd home. Pt. to return to the hospital tomorrow for scheduled . Pt. to return to triage with increase abdominal contractions, leakage of fluid, vaginal bleeding, or decrease fetal movement.

## 2020-01-07 NOTE — OB PROVIDER TRIAGE NOTE - ADDITIONAL INSTRUCTIONS
Discussed findings with Dr. Obrien. Pt. d/c'd home. Pt. to return to the hospital tomorrow for scheduled . Pt. to return to triage with increase abdominal contractions, leakage of fluid, vaginal bleeding, or decrease fetal movement.

## 2020-01-08 ENCOUNTER — APPOINTMENT (OUTPATIENT)
Dept: OBGYN | Facility: CLINIC | Age: 30
End: 2020-01-08

## 2020-01-08 ENCOUNTER — TRANSCRIPTION ENCOUNTER (OUTPATIENT)
Age: 30
End: 2020-01-08

## 2020-01-08 ENCOUNTER — INPATIENT (INPATIENT)
Facility: HOSPITAL | Age: 30
LOS: 2 days | Discharge: HOME | End: 2020-01-11
Attending: OBSTETRICS & GYNECOLOGY | Admitting: OBSTETRICS & GYNECOLOGY
Payer: MEDICAID

## 2020-01-08 VITALS — DIASTOLIC BLOOD PRESSURE: 68 MMHG | SYSTOLIC BLOOD PRESSURE: 126 MMHG | HEART RATE: 114 BPM | TEMPERATURE: 98 F

## 2020-01-08 DIAGNOSIS — O36.63X0 MATERNAL CARE FOR EXCESSIVE FETAL GROWTH, THIRD TRIMESTER, NOT APPLICABLE OR UNSPECIFIED: ICD-10-CM

## 2020-01-08 DIAGNOSIS — K08.409 PARTIAL LOSS OF TEETH, UNSPECIFIED CAUSE, UNSPECIFIED CLASS: Chronic | ICD-10-CM

## 2020-01-08 LAB
BLD GP AB SCN SERPL QL: NEGATIVE — SIGNIFICANT CHANGE UP
RH IG SCN BLD-IMP: POSITIVE — SIGNIFICANT CHANGE UP
T PALLIDUM AB TITR SER: NEGATIVE — SIGNIFICANT CHANGE UP

## 2020-01-08 PROCEDURE — 59510 CESAREAN DELIVERY: CPT | Mod: U9

## 2020-01-08 RX ORDER — SODIUM CHLORIDE 9 MG/ML
1000 INJECTION, SOLUTION INTRAVENOUS
Refills: 0 | Status: DISCONTINUED | OUTPATIENT
Start: 2020-01-08 | End: 2020-01-09

## 2020-01-08 RX ORDER — ACETAMINOPHEN 500 MG
975 TABLET ORAL
Refills: 0 | Status: DISCONTINUED | OUTPATIENT
Start: 2020-01-08 | End: 2020-01-11

## 2020-01-08 RX ORDER — FAMOTIDINE 10 MG/ML
20 INJECTION INTRAVENOUS ONCE
Refills: 0 | Status: COMPLETED | OUTPATIENT
Start: 2020-01-08 | End: 2020-01-08

## 2020-01-08 RX ORDER — SODIUM CHLORIDE 9 MG/ML
500 INJECTION, SOLUTION INTRAVENOUS ONCE
Refills: 0 | Status: COMPLETED | OUTPATIENT
Start: 2020-01-08 | End: 2020-01-08

## 2020-01-08 RX ORDER — CITRIC ACID/SODIUM CITRATE 300-500 MG
30 SOLUTION, ORAL ORAL ONCE
Refills: 0 | Status: DISCONTINUED | OUTPATIENT
Start: 2020-01-08 | End: 2020-01-08

## 2020-01-08 RX ORDER — OXYTOCIN 10 UNIT/ML
333.33 VIAL (ML) INJECTION
Qty: 20 | Refills: 0 | Status: DISCONTINUED | OUTPATIENT
Start: 2020-01-08 | End: 2020-01-08

## 2020-01-08 RX ORDER — OXYCODONE HYDROCHLORIDE 5 MG/1
5 TABLET ORAL
Refills: 0 | Status: COMPLETED | OUTPATIENT
Start: 2020-01-08 | End: 2020-01-15

## 2020-01-08 RX ORDER — HYDROMORPHONE HYDROCHLORIDE 2 MG/ML
1 INJECTION INTRAMUSCULAR; INTRAVENOUS; SUBCUTANEOUS ONCE
Refills: 0 | Status: DISCONTINUED | OUTPATIENT
Start: 2020-01-09 | End: 2020-01-09

## 2020-01-08 RX ORDER — SODIUM CHLORIDE 9 MG/ML
1000 INJECTION, SOLUTION INTRAVENOUS ONCE
Refills: 0 | Status: DISCONTINUED | OUTPATIENT
Start: 2020-01-08 | End: 2020-01-11

## 2020-01-08 RX ORDER — GLYCERIN ADULT
1 SUPPOSITORY, RECTAL RECTAL AT BEDTIME
Refills: 0 | Status: DISCONTINUED | OUTPATIENT
Start: 2020-01-08 | End: 2020-01-11

## 2020-01-08 RX ORDER — KETOROLAC TROMETHAMINE 30 MG/ML
30 SYRINGE (ML) INJECTION EVERY 6 HOURS
Refills: 0 | Status: DISCONTINUED | OUTPATIENT
Start: 2020-01-08 | End: 2020-01-09

## 2020-01-08 RX ORDER — ONDANSETRON 8 MG/1
4 TABLET, FILM COATED ORAL EVERY 6 HOURS
Refills: 0 | Status: DISCONTINUED | OUTPATIENT
Start: 2020-01-09 | End: 2020-01-09

## 2020-01-08 RX ORDER — METOCLOPRAMIDE HCL 10 MG
10 TABLET ORAL ONCE
Refills: 0 | Status: DISCONTINUED | OUTPATIENT
Start: 2020-01-08 | End: 2020-01-08

## 2020-01-08 RX ORDER — OXYCODONE HYDROCHLORIDE 5 MG/1
5 TABLET ORAL ONCE
Refills: 0 | Status: DISCONTINUED | OUTPATIENT
Start: 2020-01-08 | End: 2020-01-11

## 2020-01-08 RX ORDER — ACETAMINOPHEN 500 MG
2 TABLET ORAL
Qty: 0 | Refills: 0 | DISCHARGE

## 2020-01-08 RX ORDER — HEPARIN SODIUM 5000 [USP'U]/ML
10000 INJECTION INTRAVENOUS; SUBCUTANEOUS EVERY 12 HOURS
Refills: 0 | Status: DISCONTINUED | OUTPATIENT
Start: 2020-01-08 | End: 2020-01-11

## 2020-01-08 RX ORDER — TETANUS TOXOID, REDUCED DIPHTHERIA TOXOID AND ACELLULAR PERTUSSIS VACCINE, ADSORBED 5; 2.5; 8; 8; 2.5 [IU]/.5ML; [IU]/.5ML; UG/.5ML; UG/.5ML; UG/.5ML
0.5 SUSPENSION INTRAMUSCULAR ONCE
Refills: 0 | Status: DISCONTINUED | OUTPATIENT
Start: 2020-01-08 | End: 2020-01-11

## 2020-01-08 RX ORDER — METOCLOPRAMIDE HCL 10 MG
10 TABLET ORAL ONCE
Refills: 0 | Status: COMPLETED | OUTPATIENT
Start: 2020-01-08 | End: 2020-01-08

## 2020-01-08 RX ORDER — OXYCODONE HYDROCHLORIDE 5 MG/1
10 TABLET ORAL
Refills: 0 | Status: DISCONTINUED | OUTPATIENT
Start: 2020-01-09 | End: 2020-01-09

## 2020-01-08 RX ORDER — LANOLIN
1 OINTMENT (GRAM) TOPICAL EVERY 6 HOURS
Refills: 0 | Status: DISCONTINUED | OUTPATIENT
Start: 2020-01-08 | End: 2020-01-11

## 2020-01-08 RX ORDER — SODIUM CHLORIDE 9 MG/ML
1000 INJECTION, SOLUTION INTRAVENOUS
Refills: 0 | Status: DISCONTINUED | OUTPATIENT
Start: 2020-01-08 | End: 2020-01-08

## 2020-01-08 RX ORDER — CITRIC ACID/SODIUM CITRATE 300-500 MG
30 SOLUTION, ORAL ORAL ONCE
Refills: 0 | Status: COMPLETED | OUTPATIENT
Start: 2020-01-08 | End: 2020-01-08

## 2020-01-08 RX ORDER — MAGNESIUM HYDROXIDE 400 MG/1
30 TABLET, CHEWABLE ORAL
Refills: 0 | Status: DISCONTINUED | OUTPATIENT
Start: 2020-01-08 | End: 2020-01-11

## 2020-01-08 RX ORDER — IBUPROFEN 200 MG
600 TABLET ORAL EVERY 6 HOURS
Refills: 0 | Status: COMPLETED | OUTPATIENT
Start: 2020-01-08 | End: 2020-12-06

## 2020-01-08 RX ORDER — DIPHENHYDRAMINE HCL 50 MG
25 CAPSULE ORAL EVERY 6 HOURS
Refills: 0 | Status: DISCONTINUED | OUTPATIENT
Start: 2020-01-08 | End: 2020-01-11

## 2020-01-08 RX ORDER — SODIUM CHLORIDE 9 MG/ML
1000 INJECTION, SOLUTION INTRAVENOUS ONCE
Refills: 0 | Status: DISCONTINUED | OUTPATIENT
Start: 2020-01-08 | End: 2020-01-08

## 2020-01-08 RX ORDER — BUTORPHANOL TARTRATE 2 MG/ML
0.12 INJECTION, SOLUTION INTRAMUSCULAR; INTRAVENOUS EVERY 6 HOURS
Refills: 0 | Status: DISCONTINUED | OUTPATIENT
Start: 2020-01-09 | End: 2020-01-09

## 2020-01-08 RX ORDER — OXYCODONE HYDROCHLORIDE 5 MG/1
5 TABLET ORAL
Refills: 0 | Status: DISCONTINUED | OUTPATIENT
Start: 2020-01-09 | End: 2020-01-09

## 2020-01-08 RX ORDER — HYDROMORPHONE HYDROCHLORIDE 2 MG/ML
0.5 INJECTION INTRAMUSCULAR; INTRAVENOUS; SUBCUTANEOUS
Refills: 0 | Status: DISCONTINUED | OUTPATIENT
Start: 2020-01-08 | End: 2020-01-08

## 2020-01-08 RX ORDER — MAGNESIUM CARBONATE 54 MG/5 ML
1 LIQUID (ML) ORAL
Qty: 0 | Refills: 0 | DISCHARGE

## 2020-01-08 RX ORDER — FLUTICASONE PROPIONATE 50 MCG
1 SPRAY, SUSPENSION NASAL
Qty: 0 | Refills: 0 | DISCHARGE

## 2020-01-08 RX ORDER — SIMETHICONE 80 MG/1
80 TABLET, CHEWABLE ORAL EVERY 4 HOURS
Refills: 0 | Status: DISCONTINUED | OUTPATIENT
Start: 2020-01-08 | End: 2020-01-11

## 2020-01-08 RX ORDER — OXYTOCIN 10 UNIT/ML
333.33 VIAL (ML) INJECTION
Qty: 20 | Refills: 0 | Status: DISCONTINUED | OUTPATIENT
Start: 2020-01-08 | End: 2020-01-09

## 2020-01-08 RX ORDER — NALOXONE HYDROCHLORIDE 4 MG/.1ML
0.1 SPRAY NASAL
Refills: 0 | Status: DISCONTINUED | OUTPATIENT
Start: 2020-01-09 | End: 2020-01-09

## 2020-01-08 RX ORDER — SODIUM CHLORIDE 9 MG/ML
1000 INJECTION, SOLUTION INTRAVENOUS ONCE
Refills: 0 | Status: COMPLETED | OUTPATIENT
Start: 2020-01-08 | End: 2020-01-08

## 2020-01-08 RX ADMIN — Medication 975 MILLIGRAM(S): at 21:01

## 2020-01-08 RX ADMIN — HYDROMORPHONE HYDROCHLORIDE 0.5 MILLIGRAM(S): 2 INJECTION INTRAMUSCULAR; INTRAVENOUS; SUBCUTANEOUS at 18:30

## 2020-01-08 RX ADMIN — Medication 30 MILLILITER(S): at 08:12

## 2020-01-08 RX ADMIN — SODIUM CHLORIDE 1000 MILLILITER(S): 9 INJECTION, SOLUTION INTRAVENOUS at 16:04

## 2020-01-08 RX ADMIN — Medication 975 MILLIGRAM(S): at 22:00

## 2020-01-08 RX ADMIN — HYDROMORPHONE HYDROCHLORIDE 0.5 MILLIGRAM(S): 2 INJECTION INTRAMUSCULAR; INTRAVENOUS; SUBCUTANEOUS at 15:27

## 2020-01-08 RX ADMIN — HEPARIN SODIUM 10000 UNIT(S): 5000 INJECTION INTRAVENOUS; SUBCUTANEOUS at 18:18

## 2020-01-08 RX ADMIN — FAMOTIDINE 20 MILLIGRAM(S): 10 INJECTION INTRAVENOUS at 08:12

## 2020-01-08 RX ADMIN — Medication 10 MILLIGRAM(S): at 08:12

## 2020-01-08 RX ADMIN — HYDROMORPHONE HYDROCHLORIDE 0.5 MILLIGRAM(S): 2 INJECTION INTRAMUSCULAR; INTRAVENOUS; SUBCUTANEOUS at 18:18

## 2020-01-08 RX ADMIN — SODIUM CHLORIDE 2000 MILLILITER(S): 9 INJECTION, SOLUTION INTRAVENOUS at 08:12

## 2020-01-08 RX ADMIN — SODIUM CHLORIDE 200 MILLILITER(S): 9 INJECTION, SOLUTION INTRAVENOUS at 16:32

## 2020-01-08 RX ADMIN — SODIUM CHLORIDE 125 MILLILITER(S): 9 INJECTION, SOLUTION INTRAVENOUS at 08:12

## 2020-01-08 NOTE — OB PROVIDER H&P - HISTORY OF PRESENT ILLNESS
29 year old white female  EDC 2020 at 40weeks present for primary  section  today for LGA  EFW >9#    . She reports positive fetal movement but denies loss of fluid or vaginal bleeding.  denied any ap issues denied any fetal issues   was seen in triage  for rib pain  no suspected fracture and pain improved with percocet and last dose 1/3     med hx obesity BMI 45  surghx wisdom teeth extraction 2015   meds pnvqd  tums Tylenol Mucinex Flonase   allergies  Neosporin  ear drops  ear swelling   OB hx  2015  FT  male 7.9#               2014 TOP x1    Gyn hx denied

## 2020-01-08 NOTE — OB RN DELIVERY SUMMARY - NS_SEPSISRSKCALC_OBGYN_ALL_OB_FT
EOS calculated successfully. EOS Risk Factor: 0.5/1000 live births ([facility] national incidence); GA=40w;Temp=98.42; ROM=0.017; GBS='Positive'; Antibiotics='No antibiotics or any antibiotics < 2 hrs prior to birth'

## 2020-01-08 NOTE — OB PROVIDER H&P - PMH
"Anesthesia Transfer of Care Note    Patient: Crystal Hurley    Procedure(s) Performed: Procedure(s) (LRB):  DELIVERY-CEASAREAN SECTION (N/A)    Patient location: Labor and Delivery    Anesthesia Type: spinal    Transport from OR: Transported from OR on room air with adequate spontaneous ventilation    Post pain: adequate analgesia    Post assessment: no apparent anesthetic complications    Post vital signs: stable    Level of consciousness: awake, alert and oriented    Nausea/Vomiting: no nausea/vomiting    Complications: none    Transfer of care protocol was followed      Last vitals:     8:24 AM  Bp: 124/74  Hr: 83  SpO2: 99%      Visit Vitals  /82   Pulse 85   Temp 36.7 °C (98 °F) (Oral)   Resp 18   Ht 5' 7" (1.702 m)   Wt 75.3 kg (166 lb 0.1 oz)   LMP 07/08/2016 (Approximate)   SpO2 (!) 91%   Breastfeeding? No   BMI 26.00 kg/m²     "
Maternal care for excessive fetal growth    No pertinent past medical history    Normal vaginal delivery  2015  FT (M) 7#9  Termination of pregnancy (fetus)

## 2020-01-08 NOTE — OB POSTPARTUM EVENT NOTE - NS_EVENTSUMMARY1_OBGYN_ALL_OB_FT
Pt s/p primary scheduled c section for macrosomia. First hour urine output 15ml, pt 134kg, adequate output thus 67ml/hour. Vitals WNL, lochia moderate with small clot. 1500ml iv fluids given during case.

## 2020-01-08 NOTE — DISCHARGE NOTE OB - MEDICATION SUMMARY - MEDICATIONS TO TAKE
I will START or STAY ON the medications listed below when I get home from the hospital:    ibuprofen 600 mg oral tablet  -- 1 tab(s) by mouth every 6 hours  -- Indication: For  delivery delivered    acetaminophen 325 mg oral tablet  -- 3 tab(s) by mouth   -- Indication: For  delivery delivered    Prenatal Multivitamins with Folic Acid 1 mg oral tablet  -- 1 tab(s) by mouth once a day  -- Indication: For  delivery delivered

## 2020-01-08 NOTE — DISCHARGE NOTE OB - PATIENT PORTAL LINK FT
You can access the FollowMyHealth Patient Portal offered by [facility] by registering at the following website: [email or web address]. By joining [employer]’s FollowMyHealth portal, you will also be able to view your health information using other applications (apps) compatible with our system.

## 2020-01-08 NOTE — DISCHARGE NOTE OB - MATERIALS PROVIDED
Vaccinations/[facility] Hearing Screen Program/Shaken Baby Prevention Handout/Back To Sleep Handout/Birth Certificate Instructions/Discharge Medication Information for Patients and Families Pocket Guide/Prescription for Breast Pump/Breastfeeding Mother’s Support Group Information/Guide to Postpartum Care/[facility] Screening Program/Bottle Feeding Log/MMR Vaccination (VIS Pub Date: 2012)/Tdap Vaccination (VIS Pub Date: 2012)/[facility]  Immunization Record

## 2020-01-08 NOTE — OB NEONATOLOGY/PEDIATRICIAN DELIVERY SUMMARY - NSPEDSNEONOTESA_OBGYN_ALL_OB_FT
Baby born to a 29 year old white female at 40weeks via primary  for macrosomia. HIV NR, RPR NR, HepB negative, rubella immune, GBS positive. no ROM, not in labor.    Baby came out crying, good tone. DCC for 20 seconds done. Dried and stimulated under radiant warmer. Transitioned well. APGAR 8/9 at 1 and 5 minutes respectively.    Gross physical examination was unremarkable, full physical exam do be done in  nursery.    Mother wants to breast feed, yes for hepatitis B vaccine for the baby.

## 2020-01-08 NOTE — OB NEONATOLOGY/PEDIATRICIAN DELIVERY SUMMARY - BABY A: APGAR 5 MIN COLOR, DELIVERY
(1) body pink, extremities blue [General Appearance - Alert] : alert [General Appearance - In No Acute Distress] : in no acute distress [General Appearance - Well-Appearing] : healthy appearing [Sclera] : the sclera and conjunctiva were normal [PERRL With Normal Accommodation] : pupils were equal in size, round, and reactive to light [Extraocular Movements] : extraocular movements were intact [Outer Ear] : the ears and nose were normal in appearance [Nasal Cavity] : the nasal mucosa and septum were normal [Oropharynx] : the oropharynx was normal [Neck Appearance] : the appearance of the neck was normal [Thyroid Diffuse Enlargement] : the thyroid was not enlarged [Respiration, Rhythm And Depth] : normal respiratory rhythm and effort [Auscultation Breath Sounds / Voice Sounds] : lungs were clear to auscultation bilaterally [Heart Rate And Rhythm] : heart rate was normal and rhythm regular [Heart Sounds] : normal S1 and S2 [Murmurs] : no murmurs [Full Pulse] : the pedal pulses are present [Edema] : there was no peripheral edema [Bowel Sounds] : normal bowel sounds [Abdomen Soft] : soft [Abdomen Tenderness] : non-tender [Cervical Lymph Nodes Enlarged Posterior Bilaterally] : posterior cervical [Cervical Lymph Nodes Enlarged Anterior Bilaterally] : anterior cervical [Supraclavicular Lymph Nodes Enlarged Bilaterally] : supraclavicular [No CVA Tenderness] : no ~M costovertebral angle tenderness [No Spinal Tenderness] : no spinal tenderness [Abnormal Walk] : normal gait [Involuntary Movements] : no involuntary movements were seen [Musculoskeletal - Swelling] : no joint swelling seen [Motor Tone] : muscle strength and tone were normal [Deep Tendon Reflexes (DTR)] : deep tendon reflexes were 2+ and symmetric [Motor Exam] : the motor exam was normal [Oriented To Time, Place, And Person] : oriented to person, place, and time [Affect] : the affect was normal [Mood] : the mood was normal [] : no hepato-splenomegaly [FreeTextEntry1] : scattered freckles

## 2020-01-08 NOTE — OB RN PATIENT PROFILE - ALERT: PERTINENT HISTORY
Non Invasive Prenatal Screen (NIPS)/Ultra Screen at 12 Weeks/Fetal Non-Stress Test (NST)/1st Trimester Sonogram/20 Week Level II Sonogram

## 2020-01-08 NOTE — DISCHARGE NOTE OB - CARE PROVIDER_API CALL
Mikael De La Cruz)  Obstetrics and Gynecology  [address] [facility], [address] [place] [address]  Phone: [phone]  Fax: [phone]  Follow Up Time:

## 2020-01-09 LAB
BASOPHILS # BLD AUTO: 0.02 K/UL — SIGNIFICANT CHANGE UP (ref 0–0.2)
BASOPHILS NFR BLD AUTO: 0.2 % — SIGNIFICANT CHANGE UP (ref 0–2)
EOSINOPHIL # BLD AUTO: 0.01 K/UL — SIGNIFICANT CHANGE UP (ref 0–0.5)
EOSINOPHIL NFR BLD AUTO: 0.1 % — SIGNIFICANT CHANGE UP (ref 0–6)
HCT VFR BLD CALC: 27.1 % — LOW (ref 34.5–45)
HGB BLD-MCNC: 9 G/DL — LOW (ref 11.5–15.5)
IMM GRANULOCYTES NFR BLD AUTO: 0.7 % — SIGNIFICANT CHANGE UP (ref 0–1.5)
LYMPHOCYTES # BLD AUTO: 1.82 K/UL — SIGNIFICANT CHANGE UP (ref 1–3.3)
LYMPHOCYTES # BLD AUTO: 18.4 % — SIGNIFICANT CHANGE UP (ref 13–44)
MCHC RBC-ENTMCNC: 28.5 PG — SIGNIFICANT CHANGE UP (ref 27–34)
MCHC RBC-ENTMCNC: 33.2 % — SIGNIFICANT CHANGE UP (ref 32–36)
MCV RBC AUTO: 85.8 FL — SIGNIFICANT CHANGE UP (ref 80–100)
MONOCYTES # BLD AUTO: 0.69 K/UL — SIGNIFICANT CHANGE UP (ref 0–0.9)
MONOCYTES NFR BLD AUTO: 7 % — SIGNIFICANT CHANGE UP (ref 2–14)
NEUTROPHILS # BLD AUTO: 7.27 K/UL — SIGNIFICANT CHANGE UP (ref 1.8–7.4)
NEUTROPHILS NFR BLD AUTO: 73.6 % — SIGNIFICANT CHANGE UP (ref 43–77)
NRBC # FLD: 0 K/UL — SIGNIFICANT CHANGE UP (ref 0–0)
PLATELET # BLD AUTO: 173 K/UL — SIGNIFICANT CHANGE UP (ref 150–400)
PMV BLD: 13.2 FL — HIGH (ref 7–13)
RBC # BLD: 3.16 M/UL — LOW (ref 3.8–5.2)
RBC # FLD: 15.3 % — HIGH (ref 10.3–14.5)
WBC # BLD: 9.88 K/UL — SIGNIFICANT CHANGE UP (ref 3.8–10.5)
WBC # FLD AUTO: 9.88 K/UL — SIGNIFICANT CHANGE UP (ref 3.8–10.5)

## 2020-01-09 RX ORDER — FERROUS SULFATE 325(65) MG
325 TABLET ORAL THREE TIMES A DAY
Refills: 0 | Status: DISCONTINUED | OUTPATIENT
Start: 2020-01-09 | End: 2020-01-11

## 2020-01-09 RX ORDER — OXYCODONE HYDROCHLORIDE 5 MG/1
5 TABLET ORAL
Refills: 0 | Status: DISCONTINUED | OUTPATIENT
Start: 2020-01-09 | End: 2020-01-11

## 2020-01-09 RX ORDER — ASCORBIC ACID 60 MG
500 TABLET,CHEWABLE ORAL DAILY
Refills: 0 | Status: DISCONTINUED | OUTPATIENT
Start: 2020-01-09 | End: 2020-01-11

## 2020-01-09 RX ORDER — IBUPROFEN 200 MG
600 TABLET ORAL EVERY 6 HOURS
Refills: 0 | Status: DISCONTINUED | OUTPATIENT
Start: 2020-01-09 | End: 2020-01-11

## 2020-01-09 RX ADMIN — Medication 30 MILLIGRAM(S): at 06:23

## 2020-01-09 RX ADMIN — OXYCODONE HYDROCHLORIDE 5 MILLIGRAM(S): 5 TABLET ORAL at 13:39

## 2020-01-09 RX ADMIN — Medication 975 MILLIGRAM(S): at 04:36

## 2020-01-09 RX ADMIN — OXYCODONE HYDROCHLORIDE 5 MILLIGRAM(S): 5 TABLET ORAL at 14:39

## 2020-01-09 RX ADMIN — Medication 975 MILLIGRAM(S): at 03:35

## 2020-01-09 RX ADMIN — Medication 975 MILLIGRAM(S): at 22:00

## 2020-01-09 RX ADMIN — OXYCODONE HYDROCHLORIDE 5 MILLIGRAM(S): 5 TABLET ORAL at 23:53

## 2020-01-09 RX ADMIN — OXYCODONE HYDROCHLORIDE 5 MILLIGRAM(S): 5 TABLET ORAL at 07:00

## 2020-01-09 RX ADMIN — Medication 30 MILLIGRAM(S): at 13:00

## 2020-01-09 RX ADMIN — Medication 30 MILLIGRAM(S): at 00:40

## 2020-01-09 RX ADMIN — Medication 975 MILLIGRAM(S): at 21:20

## 2020-01-09 RX ADMIN — HEPARIN SODIUM 10000 UNIT(S): 5000 INJECTION INTRAVENOUS; SUBCUTANEOUS at 06:22

## 2020-01-09 RX ADMIN — Medication 30 MILLIGRAM(S): at 00:01

## 2020-01-09 RX ADMIN — Medication 30 MILLIGRAM(S): at 13:42

## 2020-01-09 RX ADMIN — SIMETHICONE 80 MILLIGRAM(S): 80 TABLET, CHEWABLE ORAL at 21:24

## 2020-01-09 RX ADMIN — OXYCODONE HYDROCHLORIDE 5 MILLIGRAM(S): 5 TABLET ORAL at 19:01

## 2020-01-09 RX ADMIN — HEPARIN SODIUM 10000 UNIT(S): 5000 INJECTION INTRAVENOUS; SUBCUTANEOUS at 18:01

## 2020-01-09 RX ADMIN — OXYCODONE HYDROCHLORIDE 5 MILLIGRAM(S): 5 TABLET ORAL at 18:01

## 2020-01-09 RX ADMIN — OXYCODONE HYDROCHLORIDE 5 MILLIGRAM(S): 5 TABLET ORAL at 06:40

## 2020-01-09 RX ADMIN — Medication 30 MILLIGRAM(S): at 06:50

## 2020-01-09 RX ADMIN — Medication 600 MILLIGRAM(S): at 23:53

## 2020-01-09 RX ADMIN — Medication 325 MILLIGRAM(S): at 23:53

## 2020-01-09 NOTE — PROGRESS NOTE ADULT - SUBJECTIVE AND OBJECTIVE BOX
OB Progress Note:  Delivery, POD#1    S: 30yo POD#1 s/p LTCS . Her pain is well controlled. She is tolerating a regular diet and passing flatus. Denies N/V. Denies CP/SOB/lightheadedness/dizziness.   She is ambulating without difficulty.   Voiding spontaneously.     O:   Vital Signs Last 24 Hrs  T(C): 36.8 (2020 05:20), Max: 37 (2020 22:35)  T(F): 98.3 (2020 05:20), Max: 98.6 (2020 22:35)  HR: 60 (2020 01:47) (60 - 107)  BP: 107/50 (2020 01:47) (86/55 - 146/60)  BP(mean): 81 (2020 17:00) (61 - 85)  RR: 18 (2020 01:47) (15 - 23)  SpO2: 98% (2020 01:47) (98% - 100%)    Labs:  Blood type: A Positive  Rubella IgG: RPR: Negative                          12.2   9.80 >-----------< 193    ( 07 @ 10:35 )             37.9                  PE:  General: NAD  Abdomen: Mildly distended, appropriately tender, incision c/d/i.  Extremities: No erythema, no pitting edema

## 2020-01-09 NOTE — PROGRESS NOTE ADULT - PROBLEM SELECTOR PLAN 1
- Continue regular diet.  - Increase ambulation.  - Continue motrin, tylenol, oxycodone PRN for pain control.   - F/u AM HELADIO Moseley PGY1

## 2020-01-10 RX ORDER — ACETAMINOPHEN 500 MG
3 TABLET ORAL
Qty: 0 | Refills: 0 | DISCHARGE
Start: 2020-01-10

## 2020-01-10 RX ORDER — IBUPROFEN 200 MG
1 TABLET ORAL
Qty: 0 | Refills: 0 | DISCHARGE
Start: 2020-01-10

## 2020-01-10 RX ADMIN — Medication 600 MILLIGRAM(S): at 00:30

## 2020-01-10 RX ADMIN — Medication 600 MILLIGRAM(S): at 06:01

## 2020-01-10 RX ADMIN — HEPARIN SODIUM 10000 UNIT(S): 5000 INJECTION INTRAVENOUS; SUBCUTANEOUS at 06:01

## 2020-01-10 RX ADMIN — Medication 975 MILLIGRAM(S): at 04:00

## 2020-01-10 RX ADMIN — Medication 600 MILLIGRAM(S): at 06:44

## 2020-01-10 RX ADMIN — Medication 600 MILLIGRAM(S): at 12:21

## 2020-01-10 RX ADMIN — Medication 975 MILLIGRAM(S): at 14:40

## 2020-01-10 RX ADMIN — Medication 975 MILLIGRAM(S): at 22:07

## 2020-01-10 RX ADMIN — OXYCODONE HYDROCHLORIDE 5 MILLIGRAM(S): 5 TABLET ORAL at 00:30

## 2020-01-10 RX ADMIN — HEPARIN SODIUM 10000 UNIT(S): 5000 INJECTION INTRAVENOUS; SUBCUTANEOUS at 17:38

## 2020-01-10 RX ADMIN — Medication 600 MILLIGRAM(S): at 12:50

## 2020-01-10 RX ADMIN — SIMETHICONE 80 MILLIGRAM(S): 80 TABLET, CHEWABLE ORAL at 17:38

## 2020-01-10 RX ADMIN — Medication 975 MILLIGRAM(S): at 03:26

## 2020-01-10 RX ADMIN — OXYCODONE HYDROCHLORIDE 5 MILLIGRAM(S): 5 TABLET ORAL at 07:27

## 2020-01-10 RX ADMIN — OXYCODONE HYDROCHLORIDE 5 MILLIGRAM(S): 5 TABLET ORAL at 21:31

## 2020-01-10 RX ADMIN — OXYCODONE HYDROCHLORIDE 5 MILLIGRAM(S): 5 TABLET ORAL at 08:00

## 2020-01-10 RX ADMIN — OXYCODONE HYDROCHLORIDE 5 MILLIGRAM(S): 5 TABLET ORAL at 14:09

## 2020-01-10 RX ADMIN — Medication 600 MILLIGRAM(S): at 23:56

## 2020-01-10 RX ADMIN — SIMETHICONE 80 MILLIGRAM(S): 80 TABLET, CHEWABLE ORAL at 07:27

## 2020-01-10 RX ADMIN — Medication 975 MILLIGRAM(S): at 14:08

## 2020-01-10 RX ADMIN — Medication 1 TABLET(S): at 12:21

## 2020-01-10 RX ADMIN — Medication 600 MILLIGRAM(S): at 18:10

## 2020-01-10 RX ADMIN — Medication 500 MILLIGRAM(S): at 12:21

## 2020-01-10 RX ADMIN — Medication 600 MILLIGRAM(S): at 17:38

## 2020-01-10 RX ADMIN — Medication 975 MILLIGRAM(S): at 21:31

## 2020-01-10 NOTE — PROGRESS NOTE ADULT - SUBJECTIVE AND OBJECTIVE BOX
Pt without complaints  Vital Signs Last 24 Hrs  T(C): 36.8 (10 Chung 2020 05:42), Max: 36.9 (09 Jan 2020 22:32)  T(F): 98.2 (10 Chung 2020 05:42), Max: 98.4 (09 Jan 2020 22:32)  HR: 80 (10 Chung 2020 05:42) (80 - 103)  BP: 123/61 (10 Chung 2020 05:42) (123/61 - 138/71)  BP(mean): --  RR: 17 (10 Chung 2020 05:42) (17 - 17)  SpO2: 99% (10 Chung 2020 05:42) (99% - 100%)  Blood Type: A Positive  abdomen - soft, appropriately tender, non-distended  incision - clean, dry, intact  extremeties - nontender  s/p c/s  doing well  routine post op care  dc home 1/11 am

## 2020-01-11 VITALS
RESPIRATION RATE: 18 BRPM | OXYGEN SATURATION: 99 % | SYSTOLIC BLOOD PRESSURE: 124 MMHG | HEART RATE: 74 BPM | TEMPERATURE: 98 F | DIASTOLIC BLOOD PRESSURE: 52 MMHG

## 2020-01-11 RX ADMIN — Medication 975 MILLIGRAM(S): at 10:05

## 2020-01-11 RX ADMIN — SIMETHICONE 80 MILLIGRAM(S): 80 TABLET, CHEWABLE ORAL at 09:38

## 2020-01-11 RX ADMIN — Medication 975 MILLIGRAM(S): at 09:35

## 2020-01-11 RX ADMIN — HEPARIN SODIUM 10000 UNIT(S): 5000 INJECTION INTRAVENOUS; SUBCUTANEOUS at 05:36

## 2020-01-11 RX ADMIN — MAGNESIUM HYDROXIDE 30 MILLILITER(S): 400 TABLET, CHEWABLE ORAL at 09:38

## 2020-01-11 RX ADMIN — Medication 600 MILLIGRAM(S): at 05:36

## 2020-01-11 RX ADMIN — Medication 600 MILLIGRAM(S): at 06:24

## 2020-01-11 RX ADMIN — Medication 600 MILLIGRAM(S): at 00:30

## 2020-01-11 RX ADMIN — Medication 325 MILLIGRAM(S): at 05:36

## 2020-01-11 NOTE — PROGRESS NOTE ADULT - SUBJECTIVE AND OBJECTIVE BOX
Patient assessed at 0919.  Subjective  Pain: Patient denies any pain at the time of assessment. Pain being managed well by pain management protocol.  Complaints: Patient reports headache this am but denies any blur vision, dizziness and/or weakness/fatigue. Patient states she slept well. Patient denies any history of elevated blood pressures in this pregnancy and/or previous pregnancy/delivery.   Milestones:  Alert and orientedx3. Out of bed ambulating. Positive flatus. Positive bowel movement. Voiding.  Tolerating a regular diet.  Infant feeding: formula feeding  Feeding related issues and/or concerns: none    OBJECTIVE:  T(C): 36.7 (20 @ 05:46), Max: 36.9 (01-10-20 @ 13:50)  HR: 74 (20 @ 05:46) (74 - 100)  BP: 124/52 (20 @ 05:46) (124/52 - 135/56)  RR: 18 (20 @ 05:46) (18 - 18)  SpO2: 99% (20 @ 05:46) (99% - 100%)  Wt(kg): --      LABS:                9  9.88 )-------------( 173        ( 2020 07:17 )              27.1    Blood Type: A Positive    RPR: Negative    Rubella Immune          MEDICATIONS  (STANDING):  acetaminophen   Tablet .. 975 milliGRAM(s) Oral <User Schedule>  ascorbic acid 500 milliGRAM(s) Oral daily  diphtheria/tetanus/pertussis (acellular) Vaccine (ADAcel) 0.5 milliLiter(s) IntraMuscular once  ferrous    sulfate 325 milliGRAM(s) Oral three times a day  heparin  Injectable 90547 Unit(s) SubCutaneous every 12 hours  ibuprofen  Tablet. 600 milliGRAM(s) Oral every 6 hours  lactated ringers Bolus 1000 milliLiter(s) IV Bolus once  prenatal multivitamin 1 Tablet(s) Oral daily    MEDICATIONS  (PRN):  diphenhydrAMINE 25 milliGRAM(s) Oral every 6 hours PRN Itching  glycerin Suppository - Adult 1 Suppository(s) Rectal at bedtime PRN Constipation  lanolin Ointment 1 Application(s) Topical every 6 hours PRN Sore Nipples  magnesium hydroxide Suspension 30 milliLiter(s) Oral two times a day PRN Constipation  oxyCODONE    IR 5 milliGRAM(s) Oral once PRN Moderate to Severe Pain (4-10)  oxyCODONE    IR 5 milliGRAM(s) Oral every 3 hours PRN Moderate to Severe Pain (4-10)  simethicone 80 milliGRAM(s) Chew every 4 hours PRN Gas        ASSESSMENT:  30y/o     Day#3    primary post-operative  section delivery for macrosomia   Condition: Stable  Past Medical/Surgical/OB/GYN History significant for: wisdom tooth 2015,  2015, TOPx1 2014  Current Issues: EBL 800cc, c/o of headache this am  Lung sounds clear bilaterally.  Breasts: benavides, nontender. patient wearing tight bra  Nipples: intact  Abdomen: Soft, nondistended and nontender. Bowel sounds present. Fundus firm  Abdominal incision: Clean, dry and intact with dermabond.   Vaginal: Lochia light rubra  Extremities: Edema noted bilaterally and equal to lower extremities, nontender with no erythremic areas noted. Positive pedal pulses. No palpable veins noted  Other relevant physical exam findings: none    Plan  Patient to receive acetaminophen for pain follow up headache status post medication administration. Will continue to monitor.   Continue routine post-operative and postpartum care  Increase ambulation, analgesia PRN and pain medication protocol of standing ibuprofen and acetaminophen and oxycodone prn  Encouraged to wear abdominal binder for support and to use incentive spirometer  Discussed breast/nipple/engorgement care for nonbreastfeeding mother. Patient assessed at 0919.  Subjective  Pain: Patient denies any pain at the time of assessment. Pain being managed well by pain management protocol.  Complaints: Patient reports headache this am but denies any blur vision, dizziness and/or weakness/fatigue. Patient states she slept well. Patient denies any history of elevated blood pressures in this pregnancy and/or previous pregnancy/delivery.   Milestones:  Alert and orientedx3. Out of bed ambulating. Positive flatus. Positive bowel movement. Voiding.  Tolerating a regular diet.  Infant feeding: formula feeding  Feeding related issues and/or concerns: none    OBJECTIVE:  T(C): 36.7 (20 @ 05:46), Max: 36.9 (01-10-20 @ 13:50)  HR: 74 (20 @ 05:46) (74 - 100)  BP: 124/52 (20 @ 05:46) (124/52 - 135/56)  RR: 18 (20 @ 05:46) (18 - 18)  SpO2: 99% (20 @ 05:46) (99% - 100%)  Wt(kg): --      LABS:                9  9.88 )-------------( 173        ( 2020 07:17 )              27.1    Blood Type: A Positive    RPR: Negative    Rubella Immune          MEDICATIONS  (STANDING):  acetaminophen   Tablet .. 975 milliGRAM(s) Oral <User Schedule>  ascorbic acid 500 milliGRAM(s) Oral daily  diphtheria/tetanus/pertussis (acellular) Vaccine (ADAcel) 0.5 milliLiter(s) IntraMuscular once  ferrous    sulfate 325 milliGRAM(s) Oral three times a day  heparin  Injectable 00194 Unit(s) SubCutaneous every 12 hours  ibuprofen  Tablet. 600 milliGRAM(s) Oral every 6 hours  lactated ringers Bolus 1000 milliLiter(s) IV Bolus once  prenatal multivitamin 1 Tablet(s) Oral daily    MEDICATIONS  (PRN):  diphenhydrAMINE 25 milliGRAM(s) Oral every 6 hours PRN Itching  glycerin Suppository - Adult 1 Suppository(s) Rectal at bedtime PRN Constipation  lanolin Ointment 1 Application(s) Topical every 6 hours PRN Sore Nipples  magnesium hydroxide Suspension 30 milliLiter(s) Oral two times a day PRN Constipation  oxyCODONE    IR 5 milliGRAM(s) Oral once PRN Moderate to Severe Pain (4-10)  oxyCODONE    IR 5 milliGRAM(s) Oral every 3 hours PRN Moderate to Severe Pain (4-10)  simethicone 80 milliGRAM(s) Chew every 4 hours PRN Gas        ASSESSMENT:  28y/o     Day#3    primary post-operative  section delivery for macrosomia   Condition: Stable  Past Medical/Surgical/OB/GYN History significant for: wisdom tooth 2015,  2015, TOPx1 2014  Current Issues: EBL 800cc, c/o of headache this am  Lung sounds clear bilaterally.  Breasts: benavides, nontender. patient wearing tight bra  Nipples: intact  Abdomen: Soft, nondistended and nontender. Bowel sounds present. Fundus firm  Abdominal incision: Clean, dry and intact with dermabond.   Vaginal: Lochia light rubra  Extremities: Edema noted bilaterally and equal to lower extremities, nontender with no erythremic areas noted. Positive pedal pulses. No palpable veins noted  Other relevant physical exam findings: none    Plan  Patient to receive acetaminophen for pain follow up headache status post medication administration. Will continue to monitor.   Continue routine post-operative and postpartum care  Increase ambulation, analgesia PRN and pain medication protocol of standing ibuprofen and acetaminophen and oxycodone prn  Encouraged to wear abdominal binder for support and to use incentive spirometer  Discussed breast/nipple/engorgement care for nonbreastfeeding mother.     Discharge to home today pending headache status reevaluation. Discussed discharge planning.

## 2020-01-21 ENCOUNTER — APPOINTMENT (OUTPATIENT)
Dept: OBGYN | Facility: CLINIC | Age: 30
End: 2020-01-21
Payer: MEDICAID

## 2020-01-21 VITALS
WEIGHT: 272 LBS | BODY MASS INDEX: 38.94 KG/M2 | DIASTOLIC BLOOD PRESSURE: 70 MMHG | SYSTOLIC BLOOD PRESSURE: 120 MMHG | HEIGHT: 70 IN

## 2020-01-21 DIAGNOSIS — Z92.29 PERSONAL HISTORY OF OTHER DRUG THERAPY: ICD-10-CM

## 2020-01-21 DIAGNOSIS — O36.63X0 MATERNAL CARE FOR EXCESSIVE FETAL GROWTH, THIRD TRIMESTER, NOT APPLICABLE OR UNSPECIFIED: ICD-10-CM

## 2020-01-21 DIAGNOSIS — O36.80X0 PREGNANCY WITH INCONCLUSIVE FETAL VIABILITY, NOT APPLICABLE OR UNSPECIFIED: ICD-10-CM

## 2020-01-21 DIAGNOSIS — Z3A.38 38 WEEKS GESTATION OF PREGNANCY: ICD-10-CM

## 2020-01-21 DIAGNOSIS — Z3A.39 39 WEEKS GESTATION OF PREGNANCY: ICD-10-CM

## 2020-01-21 DIAGNOSIS — Z23 ENCOUNTER FOR IMMUNIZATION: ICD-10-CM

## 2020-01-21 DIAGNOSIS — Z34.81 ENCOUNTER FOR SUPERVISION OF OTHER NORMAL PREGNANCY, FIRST TRIMESTER: ICD-10-CM

## 2020-01-21 DIAGNOSIS — Z3A.37 37 WEEKS GESTATION OF PREGNANCY: ICD-10-CM

## 2020-01-21 PROCEDURE — 0503F POSTPARTUM CARE VISIT: CPT

## 2020-01-21 RX ORDER — METOCLOPRAMIDE 10 MG/1
10 TABLET ORAL EVERY 6 HOURS
Qty: 60 | Refills: 0 | Status: DISCONTINUED | COMMUNITY
Start: 2019-07-01 | End: 2020-01-21

## 2020-01-21 NOTE — HISTORY OF PRESENT ILLNESS
[Postpartum Follow Up] : postpartum follow up [Complications:___] : no complications [Delivery Date: ___] : on [unfilled] [Female] : Delivery History: baby girl [Primary C/S] : delivered by  section [Breastfeeding] : not currently nursing [Wt. ___] : weighing [unfilled] [S/Sx PP Depression] : no signs/symptoms of postpartum depression [Clean/Dry/Intact] : clean, dry and intact [Erythema] : not erythematous [Back to Normal] : is back to normal in size [Mild] : mild vaginal bleeding [Normal] : the vagina was normal [Cervix Sample Taken] : cervical sample not taken for a Pap smear [Not Done] : Examination of breasts not done [No Sign of Infection] : is showing no signs of infection [Doing Well] : is doing well [Excellent Pain Control] : has excellent pain control [None] : None [de-identified] : 40 weeks [de-identified] : erythema under panus

## 2020-02-24 ENCOUNTER — APPOINTMENT (OUTPATIENT)
Dept: OBGYN | Facility: CLINIC | Age: 30
End: 2020-02-24
Payer: MEDICAID

## 2020-02-24 VITALS
DIASTOLIC BLOOD PRESSURE: 76 MMHG | BODY MASS INDEX: 40.23 KG/M2 | HEIGHT: 70 IN | SYSTOLIC BLOOD PRESSURE: 118 MMHG | WEIGHT: 281 LBS

## 2020-02-24 PROCEDURE — 0503F POSTPARTUM CARE VISIT: CPT

## 2020-02-24 NOTE — HISTORY OF PRESENT ILLNESS
[Postpartum Follow Up] : postpartum follow up [Last Pap Date: ___] : Last Pap Date: [unfilled] [Primary C/S] : delivered by  section [Female] : Delivery History: baby girl [Wt. ___] : weighing [unfilled] [Resumed Menses] : has resumed her menses [Oral Contraceptives] : oral contraceptives [Intended Contraception] : Intended Contraception: [Clean/Dry/Intact] : clean, dry and intact [Healed] : healed [Normal] : the vagina was normal [Back to Normal] : is back to normal in size [Not Done] : Examination of breasts not done [Doing Well] : is doing well [No Sign of Infection] : is showing no signs of infection [None] : None [Excellent Pain Control] : has excellent pain control [Complications:___] : no complications [Breastfeeding] : not currently nursing [Resumed [place]] : has not resumed intercourse [S/Sx PP Depression] : no signs/symptoms of postpartum depression [Erythema] : not erythematous [Cervix Sample Taken] : cervical sample not taken for a Pap smear [de-identified] : well healed [de-identified] : doing well, wants to restart OCP, needs pap [de-identified] : Junel 1/20, risks of clot/dvt rev'd, rto 3 mos annual

## 2020-03-02 ENCOUNTER — EMERGENCY (EMERGENCY)
Facility: HOSPITAL | Age: 30
LOS: 1 days | Discharge: HOME | End: 2020-03-02
Attending: EMERGENCY MEDICINE | Admitting: EMERGENCY MEDICINE
Payer: MEDICAID

## 2020-03-02 VITALS
HEART RATE: 70 BPM | DIASTOLIC BLOOD PRESSURE: 73 MMHG | RESPIRATION RATE: 17 BRPM | OXYGEN SATURATION: 100 % | TEMPERATURE: 98 F | SYSTOLIC BLOOD PRESSURE: 119 MMHG

## 2020-03-02 VITALS
TEMPERATURE: 98 F | HEART RATE: 84 BPM | SYSTOLIC BLOOD PRESSURE: 146 MMHG | DIASTOLIC BLOOD PRESSURE: 71 MMHG | RESPIRATION RATE: 18 BRPM | OXYGEN SATURATION: 100 %

## 2020-03-02 DIAGNOSIS — K08.409 PARTIAL LOSS OF TEETH, UNSPECIFIED CAUSE, UNSPECIFIED CLASS: Chronic | ICD-10-CM

## 2020-03-02 PROCEDURE — 99284 EMERGENCY DEPT VISIT MOD MDM: CPT

## 2020-03-02 RX ORDER — METOCLOPRAMIDE HCL 10 MG
10 TABLET ORAL ONCE
Refills: 0 | Status: COMPLETED | OUTPATIENT
Start: 2020-03-02 | End: 2020-03-02

## 2020-03-02 RX ORDER — SODIUM CHLORIDE 9 MG/ML
1000 INJECTION INTRAMUSCULAR; INTRAVENOUS; SUBCUTANEOUS ONCE
Refills: 0 | Status: COMPLETED | OUTPATIENT
Start: 2020-03-02 | End: 2020-03-02

## 2020-03-02 RX ORDER — KETOROLAC TROMETHAMINE 30 MG/ML
15 SYRINGE (ML) INJECTION ONCE
Refills: 0 | Status: DISCONTINUED | OUTPATIENT
Start: 2020-03-02 | End: 2020-03-02

## 2020-03-02 RX ADMIN — SODIUM CHLORIDE 1000 MILLILITER(S): 9 INJECTION INTRAMUSCULAR; INTRAVENOUS; SUBCUTANEOUS at 17:56

## 2020-03-02 RX ADMIN — Medication 10 MILLIGRAM(S): at 17:56

## 2020-03-02 RX ADMIN — Medication 15 MILLIGRAM(S): at 17:56

## 2020-03-02 NOTE — ED PROVIDER NOTE - PATIENT PORTAL LINK FT
You can access the FollowMyHealth Patient Portal offered by [facility] by registering at the following website: [email or web address]. By joining [employer]’s FollowMyHealth portal, you will also be able to view your health information using other applications (apps) compatible with our system.

## 2020-03-02 NOTE — ED PROVIDER NOTE - NSFOLLOWUPINSTRUCTIONS_ED_ALL_ED_FT
Follow up with Neurologist for continue care.     1. TAKE ALL MEDICATIONS AS DIRECTED.    2. FOR PAIN OR FEVER YOU CAN TAKE IBUPROFEN (MOTRIN, ADVIL) OR ACETAMINOPHEN (TYLENOL) AS NEEDED, AS DIRECTED ON PACKAGING.  3. FOLLOW UP WITH YOUR PRIMARY DOCTOR WITHIN 5 DAYS AS DIRECTED.  4. IF YOU HAD LABS OR IMAGING DONE, YOU WERE GIVEN COPIES OF ALL LABS AND/OR IMAGING RESULTS FROM YOUR ER VISIT--PLEASE TAKE THEM WITH YOU TO YOUR FOLLOW UP APPOINTMENTS.  5. IF NEEDED, CALL PATIENT ACCESS SERVICES AT [phone] TO FIND A PRIMARY CARE PHYSICIAN.  OR CALL [phone] TO MAKE AN APPOINTMENT WITH THE CLINIC.  6. RETURN TO THE ER FOR ANY WORSENING SYMPTOMS OR CONCERNS.

## 2020-03-02 NOTE — ED PROVIDER NOTE - PHYSICAL EXAMINATION
Gen: well developed and well nourished, NAD  Head: NC/NT  Eyes: PERRL, EOMI, anicteric  ENT: airway patent, mmm, oral cavity and pharynx normal. No inflammation, swelling, exudate, or lesions.   CV: RRR, +S1/S2, no M/R/G  Resp: CTAB, symmetric breath sounds, no W/R/R  GI: abdomen soft non-distended, NTTP  Extremities - 5/5 strength, sensation intact  Neuro: A&Ox4, CN2-12 grossly intact, muscle strength +5/5 in UE and LE BL, gross sensation intact in UE and LE BL,  finger to nose smooth and rapid, normal rapid alternating movements, gait smooth and coordinated, negative Romberg's test, negative pronator drift  Psych: appropriate mood, normal insight

## 2020-03-02 NOTE — ED PROVIDER NOTE - NS_ ATTENDINGSCRIBEDETAILS _ED_A_ED_FT
The scribe's documentation has been prepared for the rapid assessment note under my direction and was personally reviewed by me.  BRIAN Sanders MD

## 2020-03-02 NOTE — ED ADULT TRIAGE NOTE - CHIEF COMPLAINT QUOTE
Pt with pain to right  side of head this morning with nausea no vomiting . Pt reports had episode of seeing lights in her eyes with dizziness which was different from her normal migraines.

## 2020-03-02 NOTE — ED PROVIDER NOTE - OBJECTIVE STATEMENT
29 yo F with pmhx of migraines presents with RT sided frontal headache that is similar to her past migraines. Came to ED because she had 20 min episodes of "glare in her vision" in peripheral vision. 29 yo F with pmhx of migraines presents with RT sided frontal headache that is similar to her past migraines. Came to ED because she had 20 min episodes of "glare in her vision" in peripheral vision. States her headache improved when she was having visual sxs and worsened after that epsideo improved. Takes tylenol for her headache. No nausea, vomiting, diarrhea. Had C section on 01/9.   Last follow up with Neurologist was 5 yrs ago - MRI head and neck was negative.

## 2020-04-10 NOTE — OB RN PATIENT PROFILE - TEACHING/LEARNING FACTORS INFLUENCE READINESS TO LEARN
43 y/o female pmh htn, hld, CAD s/p 4 stents c/o URI symptoms x10 days. Pt admits to cough, chills, myalgias for about 10 days and SOB for about 2-3 days. Pt states that she tried to cook dinner tonight but became very sob and diaphoretic prompting ER visit. Pt denies recent travel. Admits that her  was sick with similar symptoms last week but has improved.  Pt denies chest pain, palpitations. n/v/d, numbness, tingling, dizziness, syncope, or fever. Pt states that this did not feel similar to her previous MI.
none

## 2020-04-28 ENCOUNTER — APPOINTMENT (OUTPATIENT)
Dept: GASTROENTEROLOGY | Facility: CLINIC | Age: 30
End: 2020-04-28
Payer: MEDICAID

## 2020-04-28 ENCOUNTER — LABORATORY RESULT (OUTPATIENT)
Age: 30
End: 2020-04-28

## 2020-04-28 VITALS
HEIGHT: 70 IN | BODY MASS INDEX: 39.22 KG/M2 | HEART RATE: 89 BPM | OXYGEN SATURATION: 98 % | DIASTOLIC BLOOD PRESSURE: 80 MMHG | TEMPERATURE: 98.2 F | SYSTOLIC BLOOD PRESSURE: 102 MMHG | WEIGHT: 274 LBS

## 2020-04-28 DIAGNOSIS — Z00.00 ENCOUNTER FOR GENERAL ADULT MEDICAL EXAMINATION W/OUT ABNORMAL FINDINGS: ICD-10-CM

## 2020-04-28 PROCEDURE — 99203 OFFICE O/P NEW LOW 30 MIN: CPT

## 2020-04-28 NOTE — ASSESSMENT
[FreeTextEntry1] : Impression and plan\par The patient is a 30-year-old female who presents to the office as an add-on urgent visit with complaints of lingering abdominal pain. Physical examination today is unrevealing except for minimal tenderness throughout the lower abdomen patient is obese which limits physical examination. Patient will be sent for CT scan of the abdomen and pelvis to rule out unsuspected pathology. Upper endoscopy and colonoscopy will be arranged but this is necessarily on hold secondary to current coronavirus situation. Patient will have blood work and urinalysis drawn today she will call back for these results. I'll make further suggestions pending above and advise accordingly. Patient was told to continue over-the-counter H2 blocker as she has been taking as this has afforded her some relief.

## 2020-04-28 NOTE — HISTORY OF PRESENT ILLNESS
[FreeTextEntry1] : New patient presented to the office today for evaluation of lingering abdominal pain. Patient is 4 months postpartum and was doing well until about 4 weeks ago when she developed an acute enteritis type symptom complex with nausea vomiting and diarrhea. These symptoms resolved. Patient then went on to develop a urinary tract infection for which she was treated with antibiotic. This was about 2 weeks ago. Symptomatology improved the patient again developed abdominal complaints including periumbilical bilateral upper quadrant and bilateral lower quadrant and mid back pain. Patient has had pain for about a week now which was quite severe yesterday. It has since improved. Patient denies associated nausea vomiting fever chills rectal bleeding or melena. She does have pain in the umbilicus and both lower quadrants as her main complaint. She has no cardiac or pulmonary complaints. The baby is doing well she continues to lose weight. Patient has been heavy at approximately 300 pounds but is losing weight since her delivery.

## 2020-04-29 LAB
ALBUMIN SERPL ELPH-MCNC: 4.2 G/DL
ALP BLD-CCNC: 70 U/L
ALT SERPL-CCNC: 16 U/L
ANION GAP SERPL CALC-SCNC: 14 MMOL/L
AST SERPL-CCNC: 17 U/L
BASOPHILS # BLD AUTO: 0.02 K/UL
BASOPHILS NFR BLD AUTO: 0.3 %
BILIRUB SERPL-MCNC: 0.4 MG/DL
BUN SERPL-MCNC: 7 MG/DL
CALCIUM SERPL-MCNC: 9.7 MG/DL
CHLORIDE SERPL-SCNC: 104 MMOL/L
CO2 SERPL-SCNC: 22 MMOL/L
CREAT SERPL-MCNC: 0.44 MG/DL
EOSINOPHIL # BLD AUTO: 0.04 K/UL
EOSINOPHIL NFR BLD AUTO: 0.7 %
GLUCOSE SERPL-MCNC: 99 MG/DL
HCT VFR BLD CALC: 44.2 %
HGB BLD-MCNC: 13.9 G/DL
IMM GRANULOCYTES NFR BLD AUTO: 0.2 %
LYMPHOCYTES # BLD AUTO: 2.01 K/UL
LYMPHOCYTES NFR BLD AUTO: 33.7 %
MAN DIFF?: NORMAL
MCHC RBC-ENTMCNC: 26.4 PG
MCHC RBC-ENTMCNC: 31.4 GM/DL
MCV RBC AUTO: 84 FL
MONOCYTES # BLD AUTO: 0.44 K/UL
MONOCYTES NFR BLD AUTO: 7.4 %
NEUTROPHILS # BLD AUTO: 3.44 K/UL
NEUTROPHILS NFR BLD AUTO: 57.7 %
PLATELET # BLD AUTO: 242 K/UL
POTASSIUM SERPL-SCNC: 4.3 MMOL/L
PROT SERPL-MCNC: 6.7 G/DL
RBC # BLD: 5.26 M/UL
RBC # FLD: 14.7 %
SODIUM SERPL-SCNC: 140 MMOL/L
WBC # FLD AUTO: 5.96 K/UL

## 2020-04-30 LAB — BACTERIA UR CULT: NORMAL

## 2020-05-02 ENCOUNTER — APPOINTMENT (OUTPATIENT)
Dept: CT IMAGING | Facility: IMAGING CENTER | Age: 30
End: 2020-05-02
Payer: MEDICAID

## 2020-05-02 ENCOUNTER — OUTPATIENT (OUTPATIENT)
Dept: OUTPATIENT SERVICES | Facility: HOSPITAL | Age: 30
LOS: 1 days | End: 2020-05-02
Payer: MEDICAID

## 2020-05-02 DIAGNOSIS — Z00.00 ENCOUNTER FOR GENERAL ADULT MEDICAL EXAMINATION WITHOUT ABNORMAL FINDINGS: ICD-10-CM

## 2020-05-02 DIAGNOSIS — K08.409 PARTIAL LOSS OF TEETH, UNSPECIFIED CAUSE, UNSPECIFIED CLASS: Chronic | ICD-10-CM

## 2020-05-02 PROCEDURE — 74177 CT ABD & PELVIS W/CONTRAST: CPT | Mod: 26

## 2020-05-02 PROCEDURE — 74177 CT ABD & PELVIS W/CONTRAST: CPT

## 2020-05-26 ENCOUNTER — EMERGENCY (EMERGENCY)
Facility: HOSPITAL | Age: 30
LOS: 1 days | Discharge: HOME | End: 2020-05-26
Attending: EMERGENCY MEDICINE
Payer: MEDICAID

## 2020-05-26 VITALS
OXYGEN SATURATION: 100 % | WEIGHT: 279.99 LBS | DIASTOLIC BLOOD PRESSURE: 93 MMHG | HEIGHT: 70 IN | HEART RATE: 101 BPM | RESPIRATION RATE: 18 BRPM | SYSTOLIC BLOOD PRESSURE: 134 MMHG | TEMPERATURE: 98 F

## 2020-05-26 DIAGNOSIS — K08.409 PARTIAL LOSS OF TEETH, UNSPECIFIED CAUSE, UNSPECIFIED CLASS: Chronic | ICD-10-CM

## 2020-05-26 PROCEDURE — 99285 EMERGENCY DEPT VISIT HI MDM: CPT

## 2020-05-26 PROCEDURE — 93010 ELECTROCARDIOGRAM REPORT: CPT

## 2020-05-26 RX ORDER — ONDANSETRON 8 MG/1
4 TABLET, FILM COATED ORAL ONCE
Refills: 0 | Status: COMPLETED | OUTPATIENT
Start: 2020-05-26 | End: 2020-05-26

## 2020-05-26 RX ORDER — SODIUM CHLORIDE 9 MG/ML
1000 INJECTION INTRAMUSCULAR; INTRAVENOUS; SUBCUTANEOUS ONCE
Refills: 0 | Status: COMPLETED | OUTPATIENT
Start: 2020-05-26 | End: 2020-05-26

## 2020-05-26 NOTE — ED ADULT NURSE NOTE - OBJECTIVE STATEMENT
on sat pt developed left arm numbness that progressed to bilateral leg numbness on sunday and right arm numbness today.  pulses and refill are without deficit.  she also developed chest pain in triage and vomited with diarrhea today at home  placed on CRM  she denies problems walking and with ADL

## 2020-05-27 VITALS
RESPIRATION RATE: 16 BRPM | DIASTOLIC BLOOD PRESSURE: 82 MMHG | HEART RATE: 82 BPM | OXYGEN SATURATION: 98 % | SYSTOLIC BLOOD PRESSURE: 122 MMHG | TEMPERATURE: 99 F

## 2020-05-27 LAB
ALBUMIN SERPL ELPH-MCNC: 4.6 G/DL — SIGNIFICANT CHANGE UP (ref 3.3–5)
ALP SERPL-CCNC: 74 U/L — SIGNIFICANT CHANGE UP (ref 40–120)
ALT FLD-CCNC: 33 U/L — SIGNIFICANT CHANGE UP (ref 10–45)
ANION GAP SERPL CALC-SCNC: 14 MMOL/L — SIGNIFICANT CHANGE UP (ref 5–17)
APPEARANCE UR: CLEAR — SIGNIFICANT CHANGE UP
AST SERPL-CCNC: 19 U/L — SIGNIFICANT CHANGE UP (ref 10–40)
BACTERIA # UR AUTO: NEGATIVE — SIGNIFICANT CHANGE UP
BASOPHILS # BLD AUTO: 0.04 K/UL — SIGNIFICANT CHANGE UP (ref 0–0.2)
BASOPHILS NFR BLD AUTO: 0.4 % — SIGNIFICANT CHANGE UP (ref 0–2)
BILIRUB SERPL-MCNC: 0.5 MG/DL — SIGNIFICANT CHANGE UP (ref 0.2–1.2)
BILIRUB UR-MCNC: NEGATIVE — SIGNIFICANT CHANGE UP
BUN SERPL-MCNC: 8 MG/DL — SIGNIFICANT CHANGE UP (ref 7–23)
CALCIUM SERPL-MCNC: 10 MG/DL — SIGNIFICANT CHANGE UP (ref 8.4–10.5)
CHLORIDE SERPL-SCNC: 103 MMOL/L — SIGNIFICANT CHANGE UP (ref 96–108)
CO2 SERPL-SCNC: 24 MMOL/L — SIGNIFICANT CHANGE UP (ref 22–31)
COLOR SPEC: YELLOW — SIGNIFICANT CHANGE UP
CREAT SERPL-MCNC: 0.43 MG/DL — LOW (ref 0.5–1.3)
DIFF PNL FLD: NEGATIVE — SIGNIFICANT CHANGE UP
EOSINOPHIL # BLD AUTO: 0.02 K/UL — SIGNIFICANT CHANGE UP (ref 0–0.5)
EOSINOPHIL NFR BLD AUTO: 0.2 % — SIGNIFICANT CHANGE UP (ref 0–6)
EPI CELLS # UR: 3 /HPF — SIGNIFICANT CHANGE UP
GLUCOSE SERPL-MCNC: 99 MG/DL — SIGNIFICANT CHANGE UP (ref 70–99)
GLUCOSE UR QL: NEGATIVE — SIGNIFICANT CHANGE UP
HCG SERPL-ACNC: <2 MIU/ML — SIGNIFICANT CHANGE UP
HCT VFR BLD CALC: 41.1 % — SIGNIFICANT CHANGE UP (ref 34.5–45)
HGB BLD-MCNC: 13.1 G/DL — SIGNIFICANT CHANGE UP (ref 11.5–15.5)
HYALINE CASTS # UR AUTO: 5 /LPF — HIGH (ref 0–2)
IMM GRANULOCYTES NFR BLD AUTO: 0.3 % — SIGNIFICANT CHANGE UP (ref 0–1.5)
KETONES UR-MCNC: NEGATIVE — SIGNIFICANT CHANGE UP
LEUKOCYTE ESTERASE UR-ACNC: NEGATIVE — SIGNIFICANT CHANGE UP
LYMPHOCYTES # BLD AUTO: 2.57 K/UL — SIGNIFICANT CHANGE UP (ref 1–3.3)
LYMPHOCYTES # BLD AUTO: 26 % — SIGNIFICANT CHANGE UP (ref 13–44)
MAGNESIUM SERPL-MCNC: 1.8 MG/DL — SIGNIFICANT CHANGE UP (ref 1.6–2.6)
MCHC RBC-ENTMCNC: 26.7 PG — LOW (ref 27–34)
MCHC RBC-ENTMCNC: 31.9 GM/DL — LOW (ref 32–36)
MCV RBC AUTO: 83.7 FL — SIGNIFICANT CHANGE UP (ref 80–100)
MONOCYTES # BLD AUTO: 0.68 K/UL — SIGNIFICANT CHANGE UP (ref 0–0.9)
MONOCYTES NFR BLD AUTO: 6.9 % — SIGNIFICANT CHANGE UP (ref 2–14)
NEUTROPHILS # BLD AUTO: 6.54 K/UL — SIGNIFICANT CHANGE UP (ref 1.8–7.4)
NEUTROPHILS NFR BLD AUTO: 66.2 % — SIGNIFICANT CHANGE UP (ref 43–77)
NITRITE UR-MCNC: NEGATIVE — SIGNIFICANT CHANGE UP
NRBC # BLD: 0 /100 WBCS — SIGNIFICANT CHANGE UP (ref 0–0)
PH UR: 6 — SIGNIFICANT CHANGE UP (ref 5–8)
PHOSPHATE SERPL-MCNC: 4.2 MG/DL — SIGNIFICANT CHANGE UP (ref 2.5–4.5)
PLATELET # BLD AUTO: 247 K/UL — SIGNIFICANT CHANGE UP (ref 150–400)
POTASSIUM SERPL-MCNC: 3.6 MMOL/L — SIGNIFICANT CHANGE UP (ref 3.5–5.3)
POTASSIUM SERPL-SCNC: 3.6 MMOL/L — SIGNIFICANT CHANGE UP (ref 3.5–5.3)
PROT SERPL-MCNC: 7.1 G/DL — SIGNIFICANT CHANGE UP (ref 6–8.3)
PROT UR-MCNC: ABNORMAL
RBC # BLD: 4.91 M/UL — SIGNIFICANT CHANGE UP (ref 3.8–5.2)
RBC # FLD: 13.7 % — SIGNIFICANT CHANGE UP (ref 10.3–14.5)
RBC CASTS # UR COMP ASSIST: 2 /HPF — SIGNIFICANT CHANGE UP (ref 0–4)
SODIUM SERPL-SCNC: 141 MMOL/L — SIGNIFICANT CHANGE UP (ref 135–145)
SP GR SPEC: 1.02 — SIGNIFICANT CHANGE UP (ref 1.01–1.02)
T3FREE SERPL-MCNC: 3.62 PG/ML — SIGNIFICANT CHANGE UP (ref 1.8–4.6)
TSH SERPL-MCNC: 1.68 UIU/ML — SIGNIFICANT CHANGE UP (ref 0.27–4.2)
UROBILINOGEN FLD QL: NEGATIVE — SIGNIFICANT CHANGE UP
WBC # BLD: 9.88 K/UL — SIGNIFICANT CHANGE UP (ref 3.8–10.5)
WBC # FLD AUTO: 9.88 K/UL — SIGNIFICANT CHANGE UP (ref 3.8–10.5)
WBC UR QL: 1 /HPF — SIGNIFICANT CHANGE UP (ref 0–5)

## 2020-05-27 PROCEDURE — 84481 FREE ASSAY (FT-3): CPT

## 2020-05-27 PROCEDURE — 96374 THER/PROPH/DIAG INJ IV PUSH: CPT | Mod: XU

## 2020-05-27 PROCEDURE — 84439 ASSAY OF FREE THYROXINE: CPT

## 2020-05-27 PROCEDURE — 84100 ASSAY OF PHOSPHORUS: CPT

## 2020-05-27 PROCEDURE — 96361 HYDRATE IV INFUSION ADD-ON: CPT

## 2020-05-27 PROCEDURE — 74177 CT ABD & PELVIS W/CONTRAST: CPT

## 2020-05-27 PROCEDURE — 99284 EMERGENCY DEPT VISIT MOD MDM: CPT | Mod: 25

## 2020-05-27 PROCEDURE — 74177 CT ABD & PELVIS W/CONTRAST: CPT | Mod: 26

## 2020-05-27 PROCEDURE — 93005 ELECTROCARDIOGRAM TRACING: CPT

## 2020-05-27 PROCEDURE — 84443 ASSAY THYROID STIM HORMONE: CPT

## 2020-05-27 PROCEDURE — 71045 X-RAY EXAM CHEST 1 VIEW: CPT

## 2020-05-27 PROCEDURE — 85027 COMPLETE CBC AUTOMATED: CPT

## 2020-05-27 PROCEDURE — 71045 X-RAY EXAM CHEST 1 VIEW: CPT | Mod: 26

## 2020-05-27 PROCEDURE — 83735 ASSAY OF MAGNESIUM: CPT

## 2020-05-27 PROCEDURE — 87086 URINE CULTURE/COLONY COUNT: CPT

## 2020-05-27 PROCEDURE — 80053 COMPREHEN METABOLIC PANEL: CPT

## 2020-05-27 PROCEDURE — 81001 URINALYSIS AUTO W/SCOPE: CPT

## 2020-05-27 PROCEDURE — 84702 CHORIONIC GONADOTROPIN TEST: CPT

## 2020-05-27 RX ADMIN — ONDANSETRON 4 MILLIGRAM(S): 8 TABLET, FILM COATED ORAL at 00:03

## 2020-05-27 RX ADMIN — SODIUM CHLORIDE 1000 MILLILITER(S): 9 INJECTION INTRAMUSCULAR; INTRAVENOUS; SUBCUTANEOUS at 03:35

## 2020-05-27 RX ADMIN — SODIUM CHLORIDE 1000 MILLILITER(S): 9 INJECTION INTRAMUSCULAR; INTRAVENOUS; SUBCUTANEOUS at 00:04

## 2020-05-27 NOTE — ED PROVIDER NOTE - CLINICAL SUMMARY MEDICAL DECISION MAKING FREE TEXT BOX
30F p/w paresthesias of upper and lower extremities, migrating, N/V with RLQ tenderness. Well appearing with normal VS. RLQ concerning for possible appy, will get CTAP. Labs to eval electrolytes. likely neg workup and will dc home with PMD follow up.

## 2020-05-27 NOTE — ED PROVIDER NOTE - SHIFT CHANGE DETAILS
Blair Moise MD FACEP note of transfer at the usual time of sign out: Receiving team will follow up on labs, analgesia, any CT and further clinical imaging prn, reassess and disposition as clinically indicated.  Details of patient and plan conveyed to receiving physician and conveyed back for understanding.  There were no questions at this time about the patient's status, disposition, and plan. Patient's care to be taken over by receiving physician at this time, all decisions regarding the progression of care will be made at their discretion.

## 2020-05-27 NOTE — ED PROVIDER NOTE - PHYSICAL EXAMINATION
Judy Mesa DO.:   GENERAL: Patient awake alert NAD.  HEENT: NC/AT, Moist mucous membranes, PERRL, EOMI.  LUNGS: CTAB, no wheezes or crackles.   CARDIAC: RRR, no m/r/g.    ABDOMEN: Obese, Soft, +RLQ tenderness, ND, No rebound, guarding. No CVA tenderness.   EXT: No edema. No calf tenderness. CV 2+DP/PT bilaterally.   MSK: No spinal tenderness, no pain with movement, no deformities.  NEURO: A&Ox3. Moving all extremities.  SKIN: Warm and dry. No rash.  PSYCH: Normal affect.

## 2020-05-27 NOTE — ED PROVIDER NOTE - OBJECTIVE STATEMENT
30y F w/ PSHx  (2020) presents to ED c/o tingling and weakness in left arm that began on Friday and then spread to hands and fingers. On Saturday, tingling spread down to legs and feet, along with weakness in upper thighs.  She came to the ED today after she vomited and felt lightheaded, with weakness beginning in right arm along with pain in neck and shoulders.   Endorses diarrhea and nausea. Denies hot/cold intolerance, F/C, dysuria. Further denies taking any medications, being on BC, any recent illness or bacterial/viral infections. [facility] 20. 30y F w/ PSHx  (2020) presents to ED c/o tingling and weakness in left arm that began on Friday and then spread to hands and fingers. On Saturday, tingling spread down to legs and feet, along with weakness in upper thighs.  She came to the ED today after she vomited and felt lightheaded, with weakness beginning in right arm along with pain in neck and shoulders. Mild to moderate, persistent.   Endorses diarrhea and nausea. Denies hot/cold intolerance, F/C, dysuria. Further denies taking any medications, being on BC, any recent illness or bacterial/viral infections. [facility] 20.

## 2020-05-27 NOTE — ED PROVIDER NOTE - PATIENT PORTAL LINK FT
You can access the FollowMyHealth Patient Portal offered by [facility] by registering at the following website: [email or web address]. By joining [employer]’s FollowMyHealth portal, you will also be able to view your health information using other applications (apps) compatible with our system.

## 2020-05-27 NOTE — ED PROVIDER NOTE - CARE PLAN
Principal Discharge DX:	Numbness and tingling of both lower extremities Principal Discharge DX:	Numbness and tingling of both lower extremities  Secondary Diagnosis:	Anxiety about health

## 2020-05-27 NOTE — ED PROVIDER NOTE - ATTENDING CONTRIBUTION TO CARE
Blair Moise MD, FACEP: In this physician's medical judgement based on clinical history and physical exam, patient with vague tingling of left arm, worse in thumb, no hot or cold intolerance, persistent pins and needles but now feeling in anterior thighs and into lower extremities  findings not consistent with cva as paresthesia sensation is spreading and bilateral  patient found with right lower quadrant pain on exam but not noted at rest or with exertion  will get iv, cbc, cmp, ekg, mag serum (family history of low mg), and reassess  will ct a/p for right lower quadrant pain consistent with intestinal symptoms as patient without sharp stabbing twisting pain consistent with torsion and patient comfortable with walking and exertion so possible early appendicitis.   will Follow up on electrolytes and ekg which was noted to be within normal limits   labs are not actionable   pending ct a/p and reassessment    Patient  understands anticipatory guidance and was given strict return and follow up precautions. The patient has been informed of all concerning signs and symptoms to return to Emergency Department, the necessity to follow up with PMD/Clinic/follow up provided within 2-3 days was explained, and the patient reports understanding of above with capacity and insight if patient disposition is to be home.     This patient was seen during the time of the COVID-19 pandemic, the care of this patient was in support of the [facility] countermeasures to COVID-19. The patient was stable for discharge and the team advised the patient that admission for further work up may pose increased risks to health that do not necessarily outweigh the possible benefits of further inpatient treatment. Patient given strict return precautions including but not limited to: abdominal pain, persistent or worsening symptoms, shortness of breath, chest pain, shortness of breath at rest, shortness of breath worsening on exertion, uncontrollable fevers, weakness, inability to eat or drink, or any concerns at all.  The patient and/or family were given the opportunity to ask questions and have them answered in full. The patient and/or family are with capacity and insight into the situation, treatment, risks, benefits, alternative therapies, and understand that they can ask any further questions if needed.

## 2020-05-28 ENCOUNTER — APPOINTMENT (OUTPATIENT)
Dept: NEUROLOGY | Facility: CLINIC | Age: 30
End: 2020-05-28
Payer: MEDICAID

## 2020-05-28 LAB
CULTURE RESULTS: SIGNIFICANT CHANGE UP
SPECIMEN SOURCE: SIGNIFICANT CHANGE UP

## 2020-05-28 PROCEDURE — 99203 OFFICE O/P NEW LOW 30 MIN: CPT | Mod: 95

## 2020-05-29 NOTE — HISTORY OF PRESENT ILLNESS
[Home] : at home, [unfilled] , at the time of the visit. [Medical Office: ([facility])___] : at the medical office located in  [Verbal consent obtained from patient] : the patient, [unfilled] [FreeTextEntry4] : Do lobato [FreeTextEntry1] : Ms. Beckham is a 30-year-old  female who consented for telehealth service and was interviewed and visually examined on 2020 in the privacy of her apartment.\par \par Current complaints–the patient is stated that she had severe migraine headache in 2020 and was evaluated in the emergency room of [facility] associated with visual obscurations halos of light which lasted for approximately 10 minutes with headaches without nausea or vomiting and was treated with intravenous medications and the headache subsided and stated that she had no testing performed.  She revisited [facility] emergency room on 2020 for 3 days of headaches associated with nausea and vomiting with possible visual obscuration and was treated with medications and does not recall if she had CT scan of the head and was discharged and is currently asymptomatic.\par \par The patient is stated that she has history of chronic migraines for 12 years or more with a frequency of 1 to 2/month with photophobia occasional nausea and graded the average pain is 7/10 and has been extensively investigated with MRI of the brain including lumbar spine which were reportedly negative in  and was evaluated by a neurologist in [place] who performed EEG and MRI of the brain and was reportedly normal and treated her with gabapentin but she discontinued because of side effects.\par \par The patient also stated that she has history of chronic low back pain and had MRI of the lumbar spine in the past showing a herniated disc but the pain is local bilateral not intractable and currently there is no radicular symptoms.\par \par Past medical history is pertinent for one  section and epidural steroids injection in the back has chronic seasonal allergies and takes Zyrtec and history of chronic anxiety for last 3 years or more but has not been evaluated by a psychologist or psychiatrist.\par \par She also stated that she has tingling and numbness feeling in arms and legs distally slightly more prominent on the left since 2020 with pins and needlelike sensation and heavy feeling in the legs in all 4 limbs without any facial weakness diplopia dysarthria dysphagia or dyspnea and there is no bowel or bladder dysfunction no history of fever rash or glandular swelling.  There is no history of ataxia cognitive language or memory dysfunction in the detail review of systems.\par \par Personal history reveals that she has no toxic habits is single and has 2 children and there is family history of diabetes migraine polymyalgia and father had aneurysm and coronary artery disease.\par \par I reviewed her medications and allergies.\par \par General examination appears unremarkable her height is 5 feet 10 inches and weight is 280 pounds and has no exercise habits.  Neck appears supple with full range of motion without any neck pain and straight leg raising test is negative with some back discomfort without radicular symptoms.  There is no leg edema.  She has no cough chest pain or shortness of breath.\par \par Neurological examination reveals her to be very pleasant alert oriented with normal memory language and cognitive functions without any apparent anxiety or depression.  Optic evaluation reveals briskly reacting symmetric pupils with normal convergence and full extraocular movements without nystagmus and there is no facial weakness hearing is normal by history.  Tongue movements are normal and palatal arches are maintained without any dysarthria or dysphagia.  Her limbs appear symmetric without motor drift and muscle testing by herself reveals at least normal symmetric +4/5 strength with normal finger-to-nose and heel-to-shin testing and her gait is normal to toe and heel and tandem walking and Romberg sign is negative.  She tested her fine touch sensation in the arms and legs and did not complain of any loss of sensation.\par \par Opinion–the patient has history of chronic migraines and currently asymptomatic but needs to be addressed with prophylactic treatment and was advised to neuro follow-up evaluation in my office including electrodiagnostic studies for peripheral sensory neuropathy and chronic low back pain.  An appointment will be scheduled within the next 2 to 3 weeks and was advised that in the event of any progressive worsening of her sensory functions or any focal motor weakness including facial weakness to contact my office immediately or go to the emergency room for reevaluation.  I also advised her to secure her prior MRI and EEG reports including the neurological consultation if available for our review.  Extensive education and counseling was undertaken today and she understands her responsibility and will return back for follow-up as soon as possible after appropriate authorization.\par \par The patient was evaluated from 7:44 AM to 8:25 AM.\jose \Osiel Levy MD

## 2020-05-30 LAB — T4 FREE SERPL-MCNC: 1.4 NG/DL — SIGNIFICANT CHANGE UP

## 2020-06-03 ENCOUNTER — APPOINTMENT (OUTPATIENT)
Dept: NEUROLOGY | Facility: CLINIC | Age: 30
End: 2020-06-03
Payer: MEDICAID

## 2020-06-03 VITALS — TEMPERATURE: 98.5 F

## 2020-06-03 DIAGNOSIS — G62.9 POLYNEUROPATHY, UNSPECIFIED: ICD-10-CM

## 2020-06-03 PROCEDURE — 95886 MUSC TEST DONE W/N TEST COMP: CPT

## 2020-06-03 PROCEDURE — 95911 NRV CNDJ TEST 9-10 STUDIES: CPT

## 2020-06-03 PROCEDURE — 99214 OFFICE O/P EST MOD 30 MIN: CPT

## 2020-06-04 PROBLEM — G62.9 SENSORY NEUROPATHY: Status: ACTIVE | Noted: 2020-05-29

## 2020-06-04 NOTE — PHYSICAL EXAM
[General Appearance - Alert] : alert [General Appearance - In No Acute Distress] : in no acute distress [Oriented To Time, Place, And Person] : oriented to person, place, and time [Impaired Insight] : insight and judgment were intact [Affect] : the affect was normal [Person] : oriented to person [Time] : oriented to time [Place] : oriented to place [Concentration Intact] : normal concentrating ability [Visual Intact] : visual attention was ~T not ~L decreased [Naming Objects] : no difficulty naming common objects [Repeating Phrases] : no difficulty repeating a phrase [Writing A Sentence] : no difficulty writing a sentence [Fluency] : fluency intact [Comprehension] : comprehension intact [Reading] : reading intact [Past History] : adequate knowledge of personal past history [Cranial Nerves Optic (II)] : visual acuity intact bilaterally,  visual fields full to confrontation, pupils equal round and reactive to light [Cranial Nerves Oculomotor (III)] : extraocular motion intact [Cranial Nerves Trigeminal (V)] : facial sensation intact symmetrically [Cranial Nerves Facial (VII)] : face symmetrical [Cranial Nerves Vestibulocochlear (VIII)] : hearing was intact bilaterally [Cranial Nerves Glossopharyngeal (IX)] : tongue and palate midline [Cranial Nerves Accessory (XI - Cranial And Spinal)] : head turning and shoulder shrug symmetric [Cranial Nerves Hypoglossal (XII)] : there was no tongue deviation with protrusion [Motor Tone] : muscle tone was normal in all four extremities [Motor Strength] : muscle strength was normal in all four extremities [No Muscle Atrophy] : normal bulk in all four extremities [Sensation Tactile Decrease] : light touch was intact [Abnormal Walk] : normal gait [Balance] : balance was intact [2+] : Ankle jerk left 2+ [FreeTextEntry1] : Vital signs were recorded and unremarkable.  There is no carotid bruit, thyromegaly or lymphadenopathy.  Chest is clear and heart sounds are normal.  Pedal pulsations are normal and there is no leg edema.  Gait is normal.  Straight leg raising test is negative.  There are no meningeal signs.\par \par Neurological examination is completely normal as delineated. [Past-pointing] : there was no past-pointing [Tremor] : no tremor present [Plantar Reflex Right Only] : normal on the right [Plantar Reflex Left Only] : normal on the left

## 2020-06-04 NOTE — HISTORY OF PRESENT ILLNESS
[FreeTextEntry1] : Ms. Beckham is a 30-year-old  female who was originally evaluated on telehealth service and was advised to return back to my office for electrophysiologic testing because of history of tingling and numbness in the hands and feet.  Today electrophysiologic studies were completely normal without any evidence of peripheral sensorimotor neuropathy and was so advised.\par \par Current complaints–the patient has history of chronic migraine with aura and has been treated including emergency room visits and currently she describes once a week headaches but occasionally they are more frequent and in the past her work-up has been negative for any intracranial disease and will secure the old reports.  I discussed the possibility of prophylactic treatment preferably with Topamax which will help with reduction of weight as she is moderately obese.  She will consider it but I will hold the treatment for the time being because of history of tingling in the hands and feet which could also be a side effect if I prescribed Topamax.  Since he is obese I would refrain from giving her Depakote and Pamelor.\par \par Patient also complains of moderate low back pain but it is mostly local without any radicular symptoms and electrophysiologic studies are unremarkable.  She has no bowel or bladder dysfunction no focal or lateralized weakness in the upper or lower extremities and no history of knee buckling or foot dragging.  She has been investigated in the past and reportedly has herniated disc and was treated with epidural steroids.\par \par She is single has 2 children and is employed and there is no history of COVID-19 exposure.  She lives in an apartment.  She has no toxic habits.  Rest of her history is as delineated in the telehealth evaluation in the past.

## 2020-06-04 NOTE — DISCUSSION/SUMMARY
[FreeTextEntry1] : Opinion–even though the patient has history of distal tingling and numbness there is no electrophysiologic evidence of peripheral sensorimotor neuropathy and the symptoms are not painful and would not require any specific treatment except for careful follow-up evaluation.  She has history of migraine and after review of her medical records I may prescribe Topamax on a prophylactic basis and for acute attack Imitrex will be appropriate as there are no contraindications.  All these matters were discussed with the patient at length and after reviewing her medical records I will activate the treatment meanwhile if there is any change she will contact me immediately.  Weight reduction strategies were discussed and she understands and will discuss this with her primary care physician.

## 2020-06-04 NOTE — REVIEW OF SYSTEMS
[Numbness] : numbness [Tingling] : tingling [Migraine Headache] : migraine headaches [As Noted in HPI] : as noted in HPI [Negative] : Heme/Lymph

## 2020-06-04 NOTE — DATA REVIEWED
[No studies available for review at this time.] : No studies available for review at this time. [de-identified] : Electrodiagnostic studies today revealed no evidence of peripheral sensorimotor neuropathy or lower motor neuron dysfunction.

## 2020-07-10 ENCOUNTER — APPOINTMENT (OUTPATIENT)
Dept: OBGYN | Facility: CLINIC | Age: 30
End: 2020-07-10
Payer: MEDICAID

## 2020-07-10 VITALS
WEIGHT: 288 LBS | SYSTOLIC BLOOD PRESSURE: 110 MMHG | DIASTOLIC BLOOD PRESSURE: 72 MMHG | HEIGHT: 70 IN | BODY MASS INDEX: 41.23 KG/M2

## 2020-07-10 DIAGNOSIS — B37.2 CANDIDIASIS OF SKIN AND NAIL: ICD-10-CM

## 2020-07-10 PROCEDURE — 99395 PREV VISIT EST AGE 18-39: CPT

## 2020-07-10 PROCEDURE — 36415 COLL VENOUS BLD VENIPUNCTURE: CPT

## 2020-07-10 RX ORDER — NORETHINDRONE ACETATE AND ETHINYL ESTRADIOL 1; 20 MG/1; UG/1
1-20 TABLET ORAL DAILY
Qty: 3 | Refills: 1 | Status: DISCONTINUED | COMMUNITY
Start: 2020-02-24 | End: 2020-07-10

## 2020-07-10 NOTE — COUNSELING
[Breast Self Exam] : breast self exam [Exercise] : exercise [Nutrition] : nutrition [Vitamins/Supplements] : vitamins/supplements [Contraception] : contraception [STD (testing, results, tx)] : STD (testing, results, tx)

## 2020-07-10 NOTE — PHYSICAL EXAM
[Awake] : awake [Alert] : alert [Acute Distress] : no acute distress [Mass] : no breast mass [Soft] : soft [Nipple Discharge] : no nipple discharge [Axillary LAD] : no axillary lymphadenopathy [Tender] : non tender [Oriented x3] : oriented to person, place, and time [No Bleeding] : there was no active vaginal bleeding [Normal] : uterus [Uterine Adnexae] : were not tender and not enlarged [External Hemorrhoid] : no external hemorrhoids

## 2020-07-11 LAB
ALBUMIN SERPL ELPH-MCNC: 4.6 G/DL
ALP BLD-CCNC: 89 U/L
ALT SERPL-CCNC: 41 U/L
ANION GAP SERPL CALC-SCNC: 13 MMOL/L
AST SERPL-CCNC: 27 U/L
BASOPHILS # BLD AUTO: 0.04 K/UL
BASOPHILS NFR BLD AUTO: 0.6 %
BILIRUB SERPL-MCNC: 0.4 MG/DL
BUN SERPL-MCNC: 12 MG/DL
CALCIUM SERPL-MCNC: 9.5 MG/DL
CHLORIDE SERPL-SCNC: 106 MMOL/L
CO2 SERPL-SCNC: 24 MMOL/L
CREAT SERPL-MCNC: 0.45 MG/DL
DHEA-S SERPL-MCNC: 220 UG/DL
EOSINOPHIL # BLD AUTO: 0.08 K/UL
EOSINOPHIL NFR BLD AUTO: 1.2 %
ESTIMATED AVERAGE GLUCOSE: 100 MG/DL
ESTRADIOL SERPL-MCNC: 46 PG/ML
FSH SERPL-MCNC: 7 IU/L
GLUCOSE SERPL-MCNC: 98 MG/DL
HBA1C MFR BLD HPLC: 5.1 %
HCT VFR BLD CALC: 44.4 %
HGB BLD-MCNC: 13.5 G/DL
IMM GRANULOCYTES NFR BLD AUTO: 0.3 %
INSULIN SERPL-MCNC: 37 UU/ML
LH SERPL-ACNC: 3.1 IU/L
LYMPHOCYTES # BLD AUTO: 2.37 K/UL
LYMPHOCYTES NFR BLD AUTO: 34.4 %
MAN DIFF?: NORMAL
MCHC RBC-ENTMCNC: 27.7 PG
MCHC RBC-ENTMCNC: 30.4 GM/DL
MCV RBC AUTO: 91 FL
MONOCYTES # BLD AUTO: 0.43 K/UL
MONOCYTES NFR BLD AUTO: 6.3 %
NEUTROPHILS # BLD AUTO: 3.94 K/UL
NEUTROPHILS NFR BLD AUTO: 57.2 %
PLATELET # BLD AUTO: 247 K/UL
POTASSIUM SERPL-SCNC: 4.2 MMOL/L
PROGEST SERPL-MCNC: 0.1 NG/ML
PROLACTIN SERPL-MCNC: 6.8 NG/ML
PROT SERPL-MCNC: 6.9 G/DL
RBC # BLD: 4.88 M/UL
RBC # FLD: 14 %
SODIUM SERPL-SCNC: 144 MMOL/L
T4 FREE SERPL-MCNC: 1.2 NG/DL
TESTOST SERPL-MCNC: 7.3 NG/DL
TSH SERPL-ACNC: 0.76 UIU/ML
WBC # FLD AUTO: 6.88 K/UL

## 2020-07-13 LAB — HPV HIGH+LOW RISK DNA PNL CVX: NOT DETECTED

## 2020-07-17 LAB — CYTOLOGY CVX/VAG DOC THIN PREP: ABNORMAL

## 2020-07-31 ENCOUNTER — APPOINTMENT (OUTPATIENT)
Dept: OBGYN | Facility: CLINIC | Age: 30
End: 2020-07-31
Payer: MEDICAID

## 2020-07-31 VITALS
BODY MASS INDEX: 40.94 KG/M2 | HEIGHT: 70 IN | SYSTOLIC BLOOD PRESSURE: 120 MMHG | DIASTOLIC BLOOD PRESSURE: 80 MMHG | TEMPERATURE: 96.2 F | WEIGHT: 286 LBS

## 2020-07-31 PROCEDURE — 99213 OFFICE O/P EST LOW 20 MIN: CPT

## 2020-08-01 NOTE — REVIEW OF SYSTEMS
[Feeling Tired] : feeling tired [Pain] : pain [Heart Rate Is Fast] : fast heart rate [Abdominal Pain] : abdominal pain [Joint Stiffness] : joint stiffness [Easy Bruising] : easy bruising [Anxiety] : anxiety [Sleep Disturbances] : sleep disturbances [Feeling Weak] : feeling week [Nl] : Hematologic/Lymphatic

## 2020-09-05 ENCOUNTER — EMERGENCY (EMERGENCY)
Facility: HOSPITAL | Age: 30
LOS: 1 days | Discharge: HOME | End: 2020-09-05
Attending: EMERGENCY MEDICINE
Payer: MEDICAID

## 2020-09-05 VITALS
HEIGHT: 70 IN | SYSTOLIC BLOOD PRESSURE: 136 MMHG | OXYGEN SATURATION: 97 % | RESPIRATION RATE: 20 BRPM | HEART RATE: 95 BPM | TEMPERATURE: 98 F | DIASTOLIC BLOOD PRESSURE: 89 MMHG | WEIGHT: 285.06 LBS

## 2020-09-05 VITALS
OXYGEN SATURATION: 100 % | RESPIRATION RATE: 16 BRPM | SYSTOLIC BLOOD PRESSURE: 119 MMHG | TEMPERATURE: 98 F | DIASTOLIC BLOOD PRESSURE: 75 MMHG | HEART RATE: 81 BPM

## 2020-09-05 DIAGNOSIS — K08.409 PARTIAL LOSS OF TEETH, UNSPECIFIED CAUSE, UNSPECIFIED CLASS: Chronic | ICD-10-CM

## 2020-09-05 LAB
ALBUMIN SERPL ELPH-MCNC: 4.3 G/DL — SIGNIFICANT CHANGE UP (ref 3.3–5)
ALP SERPL-CCNC: 67 U/L — SIGNIFICANT CHANGE UP (ref 40–120)
ALT FLD-CCNC: 23 U/L — SIGNIFICANT CHANGE UP (ref 10–45)
ANION GAP SERPL CALC-SCNC: 14 MMOL/L — SIGNIFICANT CHANGE UP (ref 5–17)
APPEARANCE UR: ABNORMAL
AST SERPL-CCNC: 26 U/L — SIGNIFICANT CHANGE UP (ref 10–40)
BACTERIA # UR AUTO: ABNORMAL
BASOPHILS # BLD AUTO: 0.05 K/UL — SIGNIFICANT CHANGE UP (ref 0–0.2)
BASOPHILS NFR BLD AUTO: 0.6 % — SIGNIFICANT CHANGE UP (ref 0–2)
BILIRUB SERPL-MCNC: 0.5 MG/DL — SIGNIFICANT CHANGE UP (ref 0.2–1.2)
BILIRUB UR-MCNC: NEGATIVE — SIGNIFICANT CHANGE UP
BUN SERPL-MCNC: 10 MG/DL — SIGNIFICANT CHANGE UP (ref 7–23)
CALCIUM SERPL-MCNC: 9.3 MG/DL — SIGNIFICANT CHANGE UP (ref 8.4–10.5)
CHLORIDE SERPL-SCNC: 105 MMOL/L — SIGNIFICANT CHANGE UP (ref 96–108)
CO2 SERPL-SCNC: 21 MMOL/L — LOW (ref 22–31)
COLOR SPEC: YELLOW — SIGNIFICANT CHANGE UP
CREAT SERPL-MCNC: 0.39 MG/DL — LOW (ref 0.5–1.3)
DIFF PNL FLD: NEGATIVE — SIGNIFICANT CHANGE UP
EOSINOPHIL # BLD AUTO: 0.02 K/UL — SIGNIFICANT CHANGE UP (ref 0–0.5)
EOSINOPHIL NFR BLD AUTO: 0.2 % — SIGNIFICANT CHANGE UP (ref 0–6)
EPI CELLS # UR: 8 /HPF — HIGH
GLUCOSE SERPL-MCNC: 97 MG/DL — SIGNIFICANT CHANGE UP (ref 70–99)
GLUCOSE UR QL: NEGATIVE — SIGNIFICANT CHANGE UP
HCT VFR BLD CALC: 41 % — SIGNIFICANT CHANGE UP (ref 34.5–45)
HGB BLD-MCNC: 13.4 G/DL — SIGNIFICANT CHANGE UP (ref 11.5–15.5)
HYALINE CASTS # UR AUTO: 8 /LPF — HIGH (ref 0–2)
IMM GRANULOCYTES NFR BLD AUTO: 0.2 % — SIGNIFICANT CHANGE UP (ref 0–1.5)
KETONES UR-MCNC: ABNORMAL
LEUKOCYTE ESTERASE UR-ACNC: NEGATIVE — SIGNIFICANT CHANGE UP
LYMPHOCYTES # BLD AUTO: 1.89 K/UL — SIGNIFICANT CHANGE UP (ref 1–3.3)
LYMPHOCYTES # BLD AUTO: 21.7 % — SIGNIFICANT CHANGE UP (ref 13–44)
MCHC RBC-ENTMCNC: 28 PG — SIGNIFICANT CHANGE UP (ref 27–34)
MCHC RBC-ENTMCNC: 32.7 GM/DL — SIGNIFICANT CHANGE UP (ref 32–36)
MCV RBC AUTO: 85.6 FL — SIGNIFICANT CHANGE UP (ref 80–100)
MONOCYTES # BLD AUTO: 0.35 K/UL — SIGNIFICANT CHANGE UP (ref 0–0.9)
MONOCYTES NFR BLD AUTO: 4 % — SIGNIFICANT CHANGE UP (ref 2–14)
NEUTROPHILS # BLD AUTO: 6.37 K/UL — SIGNIFICANT CHANGE UP (ref 1.8–7.4)
NEUTROPHILS NFR BLD AUTO: 73.3 % — SIGNIFICANT CHANGE UP (ref 43–77)
NITRITE UR-MCNC: NEGATIVE — SIGNIFICANT CHANGE UP
NRBC # BLD: 0 /100 WBCS — SIGNIFICANT CHANGE UP (ref 0–0)
PH UR: 6 — SIGNIFICANT CHANGE UP (ref 5–8)
PLATELET # BLD AUTO: 260 K/UL — SIGNIFICANT CHANGE UP (ref 150–400)
POTASSIUM SERPL-MCNC: 4.3 MMOL/L — SIGNIFICANT CHANGE UP (ref 3.5–5.3)
POTASSIUM SERPL-SCNC: 4.3 MMOL/L — SIGNIFICANT CHANGE UP (ref 3.5–5.3)
PROT SERPL-MCNC: 7.1 G/DL — SIGNIFICANT CHANGE UP (ref 6–8.3)
PROT UR-MCNC: ABNORMAL
RBC # BLD: 4.79 M/UL — SIGNIFICANT CHANGE UP (ref 3.8–5.2)
RBC # FLD: 12.5 % — SIGNIFICANT CHANGE UP (ref 10.3–14.5)
RBC CASTS # UR COMP ASSIST: 0 /HPF — SIGNIFICANT CHANGE UP (ref 0–4)
SODIUM SERPL-SCNC: 140 MMOL/L — SIGNIFICANT CHANGE UP (ref 135–145)
SP GR SPEC: 1.03 — HIGH (ref 1.01–1.02)
UROBILINOGEN FLD QL: NEGATIVE — SIGNIFICANT CHANGE UP
WBC # BLD: 8.7 K/UL — SIGNIFICANT CHANGE UP (ref 3.8–10.5)
WBC # FLD AUTO: 8.7 K/UL — SIGNIFICANT CHANGE UP (ref 3.8–10.5)
WBC UR QL: 4 /HPF — SIGNIFICANT CHANGE UP (ref 0–5)

## 2020-09-05 PROCEDURE — 93010 ELECTROCARDIOGRAM REPORT: CPT

## 2020-09-05 PROCEDURE — 85027 COMPLETE CBC AUTOMATED: CPT

## 2020-09-05 PROCEDURE — 96374 THER/PROPH/DIAG INJ IV PUSH: CPT

## 2020-09-05 PROCEDURE — 87086 URINE CULTURE/COLONY COUNT: CPT

## 2020-09-05 PROCEDURE — 96361 HYDRATE IV INFUSION ADD-ON: CPT

## 2020-09-05 PROCEDURE — 99284 EMERGENCY DEPT VISIT MOD MDM: CPT

## 2020-09-05 PROCEDURE — 80053 COMPREHEN METABOLIC PANEL: CPT

## 2020-09-05 PROCEDURE — 99284 EMERGENCY DEPT VISIT MOD MDM: CPT | Mod: 25

## 2020-09-05 PROCEDURE — 81001 URINALYSIS AUTO W/SCOPE: CPT

## 2020-09-05 PROCEDURE — 96375 TX/PRO/DX INJ NEW DRUG ADDON: CPT

## 2020-09-05 PROCEDURE — 93005 ELECTROCARDIOGRAM TRACING: CPT

## 2020-09-05 RX ORDER — METOCLOPRAMIDE HCL 10 MG
10 TABLET ORAL ONCE
Refills: 0 | Status: COMPLETED | OUTPATIENT
Start: 2020-09-05 | End: 2020-09-05

## 2020-09-05 RX ORDER — SODIUM CHLORIDE 9 MG/ML
1000 INJECTION INTRAMUSCULAR; INTRAVENOUS; SUBCUTANEOUS ONCE
Refills: 0 | Status: COMPLETED | OUTPATIENT
Start: 2020-09-05 | End: 2020-09-05

## 2020-09-05 RX ORDER — KETOROLAC TROMETHAMINE 30 MG/ML
30 SYRINGE (ML) INJECTION ONCE
Refills: 0 | Status: DISCONTINUED | OUTPATIENT
Start: 2020-09-05 | End: 2020-09-05

## 2020-09-05 RX ORDER — ACETAMINOPHEN 500 MG
650 TABLET ORAL ONCE
Refills: 0 | Status: COMPLETED | OUTPATIENT
Start: 2020-09-05 | End: 2020-09-05

## 2020-09-05 RX ADMIN — Medication 650 MILLIGRAM(S): at 19:54

## 2020-09-05 RX ADMIN — Medication 10 MILLIGRAM(S): at 18:43

## 2020-09-05 RX ADMIN — SODIUM CHLORIDE 1000 MILLILITER(S): 9 INJECTION INTRAMUSCULAR; INTRAVENOUS; SUBCUTANEOUS at 18:44

## 2020-09-05 RX ADMIN — SODIUM CHLORIDE 1000 MILLILITER(S): 9 INJECTION INTRAMUSCULAR; INTRAVENOUS; SUBCUTANEOUS at 19:58

## 2020-09-05 RX ADMIN — Medication 30 MILLIGRAM(S): at 19:20

## 2020-09-05 RX ADMIN — Medication 30 MILLIGRAM(S): at 18:43

## 2020-09-05 NOTE — ED ADULT NURSE REASSESSMENT NOTE - NS ED NURSE REASSESS COMMENT FT1
Pt reports feeling improved and ready to go home. Pt verbalized understanding of discharge instructions.
Received report this PM from WALT NIELSON. PT observed resting comfortably in bed. Pt denies any needs at this time. Awaiting laboratory results. Pt denies improvement with medications received, IV fluids still infusing. Plan of care reviewed with pt. Pt educated on call bell system and provided call bell. Bed in lowest position, wheels locked, and patient placed in position of comfort.

## 2020-09-05 NOTE — ED PROVIDER NOTE - NS ED ROS FT
General: denies fever, chills  HENT: denies nasal congestion, sore throat, rhinorrhea  Eyes: denies vision changes  CV: denies chest pain  Resp: denies difficulty breathing, cough  Abdominal: +nausea, denies vomiting, diarrhea, abdominal pain, blood in stool, dark stool  : +pain with urination  MSK: denies recent trauma  Neuro: +headaches, +numbness, dizziness, lightheadedness.  Skin: denies new rashes  Endocrine: denies recent weight loss

## 2020-09-05 NOTE — ED ADULT NURSE NOTE - NS_ED_NURSE_TEACHING_TOPIC_ED_A_ED
neurology follow up, laboratory results,  headache journal, OTC medications, follow up instructions, and return precautions

## 2020-09-05 NOTE — ED PROVIDER NOTE - PATIENT PORTAL LINK FT
You can access the FollowMyHealth Patient Portal offered by [facility] by registering at the following website: [email or web address]. By joining [facility]’s FollowMyHealth portal, you will also be able to view your health information using other applications (apps) compatible with our system.

## 2020-09-05 NOTE — ED PROVIDER NOTE - NSFOLLOWUPINSTRUCTIONS_ED_ALL_ED_FT
Thank you for visiting our Emergency Department, it has been a pleasure taking part in your healthcare.    Please follow up with your Primary Doctor in 2-3 days.      General Headache:  A headache is pain or discomfort felt around the head or neck area. The specific cause of a headache may not be found. There are many causes and types of headaches. A few common ones are:  Tension headaches. Migraine headaches. Cluster headaches. Chronic daily headaches. Sinus headaches.     Follow these instructions at home: Watch your condition for any changes. Let your health care provider know about them. Take these steps to help with your condition:  Managing pain: Take over-the-counter and prescription medicines only as told by your health care provider. Lie down in a dark, quiet room when you have a headache. If directed, put ice on your head and neck area: Put ice in a plastic bag. Place a towel between your skin and the bag. Leave the ice on for 20 minutes, 2–3 times per day. If directed, apply heat to the affected area. Use the heat source that your health care provider recommends, such as a moist heat pack or a heating pad. Place a towel between your skin and the heat source. Leave the heat on for 20–30 minutes. Remove the heat if your skin turns bright red. This is especially important if you are unable to feel pain, heat, or cold. You may have a greater risk of getting burned. Keep lights dim if bright lights bother you or make your headaches worse. Eat meals on a regular schedule. If you drink alcohol: Limit how much you use to: 0–1 drink a day for women. 0–2 drinks a day for men. Be aware of how much alcohol is in your drink. In the [place] one drink equals one 12 oz bottle of beer (355 mL), one 5 oz glass of wine (148 mL), or one 1½ oz glass of hard liquor (44 mL).     General instructions:   Keep a headache journal to help find out what may trigger your headaches. For example, write down:  What you eat and drink. How much sleep you get. Any change to your diet or medicines. Try massage or other relaxation techniques. Limit stress. Sit up straight, and do not tense your muscles. Do not use any products that contain nicotine or tobacco, such as cigarettes, e-cigarettes, and chewing tobacco. If you need help quitting, ask your health care provider. Exercise regularly as told by your health care provider. Sleep on a regular schedule. Get 7–9 hours of sleep each night, or the amount recommended by your health care provider. Keep all follow-up visits as told by your health care provider. This is important.     Contact a health care provider if:  Your symptoms are not helped by medicine. You have a headache that is different from the usual headache. You have nausea or you vomit. You have a fever.  Get help right away if:  Your headache becomes severe quickly. Your headache gets worse after moderate to intense physical activity. You have repeated vomiting. You have a stiff neck. You have a loss of vision. You have problems with speech. You have pain in the eye or ear. You have muscular weakness or loss of muscle control. You lose your balance or have trouble walking. You feel faint or pass out. You have confusion. You have a seizure.    Summary  A headache is pain or discomfort felt around the head or neck area. There are many causes and types of headaches. In some cases, the cause may not be found. Keep a headache journal to help find out what may trigger your headaches. Watch your condition for any changes. Let your health care provider know about them. Contact a health care provider if you have a headache that is different from the usual headache, or if your symptoms are not helped by medicine. Get help right away if your headache becomes severe, you vomit, you have a loss of vision, you lose your balance, or you have a seizure.    This information is not intended to replace advice given to you by your health care provider. Make sure you discuss any questions you have with your health care provider

## 2020-09-05 NOTE — ED ADULT NURSE NOTE - NSIMPLEMENTINTERV_GEN_ALL_ED
Implemented All Universal Safety Interventions:  [place] to call system. Call bell, personal items and telephone within reach. Instruct patient to call for assistance. Room bathroom lighting operational. Non-slip footwear when patient is off stretcher. Physically safe environment: no spills, clutter or unnecessary equipment. Stretcher in lowest position, wheels locked, appropriate side rails in place.

## 2020-09-05 NOTE — ED PROVIDER NOTE - CLINICAL SUMMARY MEDICAL DECISION MAKING FREE TEXT BOX
Aracelis Sahu MD: 31yo F with no significant PMH who presents with 1 week of headache, b/l neck pain. Pt hemodynamically stable, afebrile. Well appearing. No meningismus. Has been seen by multiple providers for similar symptoms. No FNDs on exam. Low suspicion for intracranial pathology. No s/s of Ideopathic intracranial HTN on exam. Will pain control, get labs to eval for electrolyte abn, UA/UC and reassess Aracelis Sahu MD: 31yo F with no significant PMH who presents with 1 week of headache, b/l neck pain. Pt hemodynamically stable, afebrile. Well appearing. No meningismus. Has been seen by multiple providers for similar symptoms. No FNDs on exam. Low suspicion for intracranial pathology. No s/s of Ideopathic intracranial HTN on exam. Will pain control, get labs to eval for electrolyte abn, UA/UC and reassess    Melissa Mitchell MD - Attending Physician: Pt with chronic pain, here with headache x 1 week. No neuro deficits, exam normal, very well appearing. No red flags. H/o headaches. UA given dysuria, HA cocktail, reassess

## 2020-09-05 NOTE — ED PROVIDER NOTE - ATTENDING CONTRIBUTION TO CARE
Melissa Mitchell MD - Attending Physician: I have personally seen and examined this patient with the resident/fellow.  I have fully participated in the care of this patient. I have reviewed all pertinent clinical information, including history, physical exam, plan and the Resident/Fellow’s note and agree except as noted. See MDM

## 2020-09-05 NOTE — ED PROVIDER NOTE - OBJECTIVE STATEMENT
Aracelis Sahu MD: 29yo F with no significant PMH who presents with 1 week of headache, b/l neck pain. States that she has been having months of chronic total body pain, had been seeing a neurologist a month or two ago. MRI done 5 years ago was reportedly unremarkable. Now complaining of L sided headache and L facial twitching with LUE numbness that is new associated with some nausea and L ear fullness. Also complaining of dysuria, was recently treated for UTI. No fever, chills, vision changes, stiff neck, SOB, CP, V/D, syncope, lightheadedness, hematuria, vaginal bleeding/discharge, hematochezia, melena, recent sick contacts, recent travel, trauma. Aracelis Sahu MD: 29yo F with no significant PMH who presents with 1 week of headache, b/l neck pain. States that she has been having months of chronic total body pain, had been seeing a neurologist a month or two ago. MRI done 5 years ago was reportedly unremarkable. Now complaining of L sided headache and L facial twitching with LUE numbness that is new associated with some nausea and L ear fullness. Also complaining of dysuria, was recently treated for UTI. No fever, chills, vision changes, stiff neck, SOB, CP, V/D, syncope, lightheadedness, hematuria, vaginal bleeding/discharge, hematochezia, melena, recent sick contacts, recent travel, trauma. H/o migraines

## 2020-09-05 NOTE — ED ADULT NURSE NOTE - OBJECTIVE STATEMENT
c/o about one week of headache across her forehead with occasional photophobia unrelieved by tylenol at home. Patient endorses left sided jaw pain as well nausea and decreased appetite. Patient reports she has hx of migraines but this does not feel like her usual migraines. Patient has been to [facility] ED for similar complaints in the past. Speech is clear, +PERRL, Denies any numbness or tingling. Equal strength of all extremities. Denies any fever or chills, denies any vomiting or diarrhea.  Skin is warm dry and intact.

## 2020-09-05 NOTE — ED PROVIDER NOTE - PROGRESS NOTE DETAILS
Ua contaminated. Not consistent with UTI. Re-assess after meds for improvement for likely dc home HA improved, Nausea resolved. F/u with neuro outpatient

## 2020-09-05 NOTE — ED PROVIDER NOTE - PHYSICAL EXAMINATION
CONSTITUTIONAL: Nontoxic, obese young female, resting comfortably, tearful  HEAD: Normocephalic; atraumatic  EYES: Normal inspection, EOMI, PERRLA 7mm b/l, optic disc sharp b/l on fundoscopy   ENMT: External appears normal; normal oropharynx  NECK: Supple; non-tender; no cervical lymphadenopathy  CARD: RRR; no audible murmurs, rubs, or gallops  RESP: No respiratory distress, lungs ctab/l  ABD: Soft, non-distended; non-tender; no rebound or guarding  EXT: No LE pitting edema or calf tenderness; distal pulses intact with good capillary refill  SKIN: Warm, dry, intact  MSK: no midline TTP, no temporal tenderness  NEURO: aaox3, CN II-IX intact, 5/5 strength b/l UE and LE, sensation intact in all extremities, no pronator drift, ambulates with a steady gait, finger to nose intact, no disdiadokinesia, no twitching noted

## 2020-09-06 LAB
CULTURE RESULTS: SIGNIFICANT CHANGE UP
SPECIMEN SOURCE: SIGNIFICANT CHANGE UP

## 2020-09-07 NOTE — ED POST DISCHARGE NOTE - DETAILS
Spoke to pt, informed of results. Pt still symptomatic advised she should follow up for repeat sample and pt agrees - Patricia Edouard PA-C

## 2020-09-14 ENCOUNTER — APPOINTMENT (OUTPATIENT)
Dept: UROLOGY | Facility: CLINIC | Age: 30
End: 2020-09-14
Payer: MEDICAID

## 2020-09-14 VITALS
BODY MASS INDEX: 40.8 KG/M2 | HEART RATE: 85 BPM | RESPIRATION RATE: 17 BRPM | HEIGHT: 70 IN | WEIGHT: 285 LBS | DIASTOLIC BLOOD PRESSURE: 76 MMHG | SYSTOLIC BLOOD PRESSURE: 112 MMHG

## 2020-09-14 VITALS — TEMPERATURE: 98 F

## 2020-09-14 DIAGNOSIS — N39.0 URINARY TRACT INFECTION, SITE NOT SPECIFIED: ICD-10-CM

## 2020-09-14 LAB
BILIRUB UR QL STRIP: NEGATIVE
CLARITY UR: CLEAR
COLLECTION METHOD: NORMAL
GLUCOSE UR-MCNC: NEGATIVE
HCG UR QL: 0.2 EU/DL
HGB UR QL STRIP.AUTO: NORMAL
KETONES UR-MCNC: NEGATIVE
LEUKOCYTE ESTERASE UR QL STRIP: NORMAL
NITRITE UR QL STRIP: NEGATIVE
PH UR STRIP: 5.5
PROT UR STRIP-MCNC: NEGATIVE
SP GR UR STRIP: 1.02

## 2020-09-14 PROCEDURE — 99203 OFFICE O/P NEW LOW 30 MIN: CPT

## 2020-09-14 NOTE — ASSESSMENT
[FreeTextEntry1] : 31 yo F with symptoms and exam findings consistent with urethral diverticulum\par \par PLAN\par MRI pelvis w contrast - urethral diverticulum protocol\par \par Counseled patient and answered questions on weight loss - that it may help with urinary symptoms, would not impact dyspareunia and dribbling\par \par RTC after MRI, if no urethral diverticulum, will consider pelvic pain and oab workup

## 2020-09-14 NOTE — HISTORY OF PRESENT ILLNESS
[FreeTextEntry1] : 30 year old P2 (vaginal,  2020) F who presents for urinary urgency, dribbling, dyspareunia and recurrent UTI.\par Symptoms for 1-2 years, worse during and after recent pregnancy\par Nocturia 2-3\par No LAVERN, no SULMA - leakage from dribbling\par \par 3 UTI's this year\par \par No pelvic pain. Endorses dyspareunia. She reports some non specific abdominal and musculoskeletal pain with anxiety.  \par \par Constipation / diarrhea

## 2020-09-14 NOTE — LETTER BODY
[Dear  ___] : Dear  [unfilled], [Courtesy Letter:] : I had the pleasure of seeing your patient, [unfilled], in my office today. [Sincerely,] : Sincerely, [Please see my note below.] : Please see my note below. [Consult Closing:] : Thank you very much for allowing me to participate in the care of this patient.  If you have any questions, please do not hesitate to contact me. [FreeTextEntry1] : She reported symptoms of urinary dribbling, urethral discomfort, dyspareunia, and recurrent UTI.\par On exam there was an area suspicious for urethral diverticulum.\par I will obtain an MRI and have the patient return to clinic to discuss the results. [FreeTextEntry3] : Dr. Juan Stevens\par Urology\par Female [facility]

## 2020-09-14 NOTE — PHYSICAL EXAM
[General Appearance - Well Developed] : well developed [Normal Appearance] : normal appearance [General Appearance - Well Nourished] : well nourished [General Appearance - In No Acute Distress] : no acute distress [Arterial Pulses Normal] : the pedal pulses were normal [Edema] : no peripheral edema [Exaggerated Use Of Accessory Muscles For Inspiration] : no accessory muscle use [Chest Palpation] : palpation of the chest revealed no abnormalities [Abdomen Soft] : soft [Abdomen Tenderness] : non-tender [Costovertebral Angle Tenderness] : no ~M costovertebral angle tenderness [External Female Genitalia] : normal external genitalia [Urinary Bladder Findings] : the bladder was normal on palpation [Vagina] : normal vaginal exam [Normal Station and Gait] : the gait and station were normal for the patient's age [Skin Color & Pigmentation] : normal skin color and pigmentation [] : no rash [Skin Lesions] : no skin lesions [No Focal Deficits] : no focal deficits [Affect] : the affect was normal [Mood] : the mood was normal [Motor Exam] : the motor exam was normal [Not Anxious] : not anxious [No Palpable Adenopathy] : no palpable adenopathy [Cervical Lymph Nodes Enlarged Anterior Bilaterally] : anterior cervical [Supraclavicular Lymph Nodes Enlarged Bilaterally] : supraclavicular [Cervical Lymph Nodes Enlarged Posterior Bilaterally] : posterior cervical [Axillary Lymph Nodes Enlarged Bilaterally] : axillary [FreeTextEntry1] : Small 1-2 cm area suburethral area of fluctuance, mild tenderness. No pelvic organ prolapse

## 2020-09-24 ENCOUNTER — OUTPATIENT (OUTPATIENT)
Dept: OUTPATIENT SERVICES | Facility: HOSPITAL | Age: 30
LOS: 1 days | End: 2020-09-24
Payer: MEDICAID

## 2020-09-24 ENCOUNTER — RESULT REVIEW (OUTPATIENT)
Age: 30
End: 2020-09-24

## 2020-09-24 ENCOUNTER — APPOINTMENT (OUTPATIENT)
Dept: MAMMOGRAPHY | Facility: CLINIC | Age: 30
End: 2020-09-24
Payer: MEDICAID

## 2020-09-24 ENCOUNTER — APPOINTMENT (OUTPATIENT)
Dept: ULTRASOUND IMAGING | Facility: CLINIC | Age: 30
End: 2020-09-24
Payer: MEDICAID

## 2020-09-24 DIAGNOSIS — Z00.8 ENCOUNTER FOR OTHER GENERAL EXAMINATION: ICD-10-CM

## 2020-09-24 DIAGNOSIS — K08.409 PARTIAL LOSS OF TEETH, UNSPECIFIED CAUSE, UNSPECIFIED CLASS: Chronic | ICD-10-CM

## 2020-09-24 PROCEDURE — G0279: CPT | Mod: 26

## 2020-09-24 PROCEDURE — 76641 ULTRASOUND BREAST COMPLETE: CPT

## 2020-09-24 PROCEDURE — 77066 DX MAMMO INCL CAD BI: CPT

## 2020-09-24 PROCEDURE — 77066 DX MAMMO INCL CAD BI: CPT | Mod: 26

## 2020-09-24 PROCEDURE — 76641 ULTRASOUND BREAST COMPLETE: CPT | Mod: 26,50

## 2020-09-24 PROCEDURE — G0279: CPT

## 2020-09-26 ENCOUNTER — OUTPATIENT (OUTPATIENT)
Dept: OUTPATIENT SERVICES | Facility: HOSPITAL | Age: 30
LOS: 1 days | End: 2020-09-26
Payer: MEDICAID

## 2020-09-26 ENCOUNTER — APPOINTMENT (OUTPATIENT)
Dept: MRI IMAGING | Facility: CLINIC | Age: 30
End: 2020-09-26
Payer: MEDICAID

## 2020-09-26 DIAGNOSIS — Z00.8 ENCOUNTER FOR OTHER GENERAL EXAMINATION: ICD-10-CM

## 2020-09-26 DIAGNOSIS — K08.409 PARTIAL LOSS OF TEETH, UNSPECIFIED CAUSE, UNSPECIFIED CLASS: Chronic | ICD-10-CM

## 2020-09-26 PROCEDURE — 72197 MRI PELVIS W/O & W/DYE: CPT

## 2020-09-26 PROCEDURE — 72197 MRI PELVIS W/O & W/DYE: CPT | Mod: 26

## 2020-09-28 ENCOUNTER — APPOINTMENT (OUTPATIENT)
Dept: UROLOGY | Facility: CLINIC | Age: 30
End: 2020-09-28
Payer: MEDICAID

## 2020-09-28 ENCOUNTER — EMERGENCY (EMERGENCY)
Facility: HOSPITAL | Age: 30
LOS: 1 days | Discharge: HOME | End: 2020-09-28
Attending: PERSONAL EMERGENCY RESPONSE ATTENDANT
Payer: MEDICAID

## 2020-09-28 VITALS
OXYGEN SATURATION: 99 % | DIASTOLIC BLOOD PRESSURE: 80 MMHG | RESPIRATION RATE: 18 BRPM | WEIGHT: 278 LBS | HEART RATE: 98 BPM | TEMPERATURE: 98 F | SYSTOLIC BLOOD PRESSURE: 154 MMHG | HEIGHT: 70 IN

## 2020-09-28 VITALS — DIASTOLIC BLOOD PRESSURE: 83 MMHG | HEART RATE: 99 BPM | SYSTOLIC BLOOD PRESSURE: 132 MMHG | RESPIRATION RATE: 16 BRPM

## 2020-09-28 VITALS
OXYGEN SATURATION: 100 % | HEART RATE: 90 BPM | RESPIRATION RATE: 17 BRPM | SYSTOLIC BLOOD PRESSURE: 100 MMHG | DIASTOLIC BLOOD PRESSURE: 69 MMHG | TEMPERATURE: 98 F

## 2020-09-28 VITALS — TEMPERATURE: 98.4 F

## 2020-09-28 DIAGNOSIS — K08.409 PARTIAL LOSS OF TEETH, UNSPECIFIED CAUSE, UNSPECIFIED CLASS: Chronic | ICD-10-CM

## 2020-09-28 LAB
ALBUMIN SERPL ELPH-MCNC: 4.7 G/DL — SIGNIFICANT CHANGE UP (ref 3.3–5)
ALP SERPL-CCNC: 67 U/L — SIGNIFICANT CHANGE UP (ref 40–120)
ALT FLD-CCNC: 31 U/L — SIGNIFICANT CHANGE UP (ref 10–45)
ANION GAP SERPL CALC-SCNC: 13 MMOL/L — SIGNIFICANT CHANGE UP (ref 5–17)
AST SERPL-CCNC: 26 U/L — SIGNIFICANT CHANGE UP (ref 10–40)
BASOPHILS # BLD AUTO: 0.03 K/UL — SIGNIFICANT CHANGE UP (ref 0–0.2)
BASOPHILS NFR BLD AUTO: 0.4 % — SIGNIFICANT CHANGE UP (ref 0–2)
BILIRUB SERPL-MCNC: 0.6 MG/DL — SIGNIFICANT CHANGE UP (ref 0.2–1.2)
BUN SERPL-MCNC: 9 MG/DL — SIGNIFICANT CHANGE UP (ref 7–23)
CALCIUM SERPL-MCNC: 9.9 MG/DL — SIGNIFICANT CHANGE UP (ref 8.4–10.5)
CHLORIDE SERPL-SCNC: 104 MMOL/L — SIGNIFICANT CHANGE UP (ref 96–108)
CO2 SERPL-SCNC: 23 MMOL/L — SIGNIFICANT CHANGE UP (ref 22–31)
CREAT SERPL-MCNC: 0.48 MG/DL — LOW (ref 0.5–1.3)
EOSINOPHIL # BLD AUTO: 0.04 K/UL — SIGNIFICANT CHANGE UP (ref 0–0.5)
EOSINOPHIL NFR BLD AUTO: 0.5 % — SIGNIFICANT CHANGE UP (ref 0–6)
GLUCOSE SERPL-MCNC: 91 MG/DL — SIGNIFICANT CHANGE UP (ref 70–99)
HCT VFR BLD CALC: 41.9 % — SIGNIFICANT CHANGE UP (ref 34.5–45)
HGB BLD-MCNC: 13.4 G/DL — SIGNIFICANT CHANGE UP (ref 11.5–15.5)
IMM GRANULOCYTES NFR BLD AUTO: 0.3 % — SIGNIFICANT CHANGE UP (ref 0–1.5)
LYMPHOCYTES # BLD AUTO: 1.84 K/UL — SIGNIFICANT CHANGE UP (ref 1–3.3)
LYMPHOCYTES # BLD AUTO: 24.5 % — SIGNIFICANT CHANGE UP (ref 13–44)
MCHC RBC-ENTMCNC: 27.6 PG — SIGNIFICANT CHANGE UP (ref 27–34)
MCHC RBC-ENTMCNC: 32 GM/DL — SIGNIFICANT CHANGE UP (ref 32–36)
MCV RBC AUTO: 86.4 FL — SIGNIFICANT CHANGE UP (ref 80–100)
MONOCYTES # BLD AUTO: 0.41 K/UL — SIGNIFICANT CHANGE UP (ref 0–0.9)
MONOCYTES NFR BLD AUTO: 5.5 % — SIGNIFICANT CHANGE UP (ref 2–14)
NEUTROPHILS # BLD AUTO: 5.16 K/UL — SIGNIFICANT CHANGE UP (ref 1.8–7.4)
NEUTROPHILS NFR BLD AUTO: 68.8 % — SIGNIFICANT CHANGE UP (ref 43–77)
NRBC # BLD: 0 /100 WBCS — SIGNIFICANT CHANGE UP (ref 0–0)
PLATELET # BLD AUTO: 253 K/UL — SIGNIFICANT CHANGE UP (ref 150–400)
POTASSIUM SERPL-MCNC: 4.1 MMOL/L — SIGNIFICANT CHANGE UP (ref 3.5–5.3)
POTASSIUM SERPL-SCNC: 4.1 MMOL/L — SIGNIFICANT CHANGE UP (ref 3.5–5.3)
PROT SERPL-MCNC: 7.3 G/DL — SIGNIFICANT CHANGE UP (ref 6–8.3)
RBC # BLD: 4.85 M/UL — SIGNIFICANT CHANGE UP (ref 3.8–5.2)
RBC # FLD: 12.8 % — SIGNIFICANT CHANGE UP (ref 10.3–14.5)
SODIUM SERPL-SCNC: 140 MMOL/L — SIGNIFICANT CHANGE UP (ref 135–145)
TSH SERPL-MCNC: 0.7 UIU/ML — SIGNIFICANT CHANGE UP (ref 0.27–4.2)
WBC # BLD: 7.5 K/UL — SIGNIFICANT CHANGE UP (ref 3.8–10.5)
WBC # FLD AUTO: 7.5 K/UL — SIGNIFICANT CHANGE UP (ref 3.8–10.5)

## 2020-09-28 PROCEDURE — 85025 COMPLETE CBC W/AUTO DIFF WBC: CPT

## 2020-09-28 PROCEDURE — 71045 X-RAY EXAM CHEST 1 VIEW: CPT | Mod: 26

## 2020-09-28 PROCEDURE — 99284 EMERGENCY DEPT VISIT MOD MDM: CPT | Mod: 25

## 2020-09-28 PROCEDURE — 70360 X-RAY EXAM OF NECK: CPT | Mod: 26

## 2020-09-28 PROCEDURE — 96374 THER/PROPH/DIAG INJ IV PUSH: CPT

## 2020-09-28 PROCEDURE — 70360 X-RAY EXAM OF NECK: CPT

## 2020-09-28 PROCEDURE — 84443 ASSAY THYROID STIM HORMONE: CPT

## 2020-09-28 PROCEDURE — 99284 EMERGENCY DEPT VISIT MOD MDM: CPT

## 2020-09-28 PROCEDURE — 71045 X-RAY EXAM CHEST 1 VIEW: CPT

## 2020-09-28 PROCEDURE — 80053 COMPREHEN METABOLIC PANEL: CPT

## 2020-09-28 PROCEDURE — 99214 OFFICE O/P EST MOD 30 MIN: CPT

## 2020-09-28 RX ORDER — IBUPROFEN 200 MG
600 TABLET ORAL ONCE
Refills: 0 | Status: COMPLETED | OUTPATIENT
Start: 2020-09-28 | End: 2020-09-28

## 2020-09-28 RX ORDER — FAMOTIDINE 10 MG/ML
20 INJECTION INTRAVENOUS ONCE
Refills: 0 | Status: COMPLETED | OUTPATIENT
Start: 2020-09-28 | End: 2020-09-28

## 2020-09-28 RX ADMIN — Medication 600 MILLIGRAM(S): at 16:18

## 2020-09-28 RX ADMIN — Medication 30 MILLILITER(S): at 16:53

## 2020-09-28 RX ADMIN — FAMOTIDINE 20 MILLIGRAM(S): 10 INJECTION INTRAVENOUS at 16:53

## 2020-09-28 NOTE — ED PROVIDER NOTE - NS ED ROS FT
Constitutional: No fever or chills  Eyes: No visual changes, eye pain or redness  HEENT: see hpi  CV: No chest pain or lower extremity edema  Resp: No SOB no cough  GI: No abd pain. No nausea or vomiting. No diarrhea. No constipation.   : No dysuria, hematuria.   MSK: No musculoskeletal pain  Skin: No rash  Neuro: No headache. No numbness or tingling. No weakness.

## 2020-09-28 NOTE — ED PROVIDER NOTE - ATTENDING CONTRIBUTION TO CARE
Attending MD Taylor.  Agree with HPI as authored by PA.  Pt is a 31 yo female with no sig pmhx/diagnoses.  Pt present to ED now with sensation of FB in anterior throat x 1 mo.  Sxs originally positional but now constant.  Also notes raspy voice and night-sweats.  Denies fevers/chills.  Able to tolerate PO.  No regurgitation.  No work-up for throat mass as yet.  Has an appt with ENT on Thursday. No assoc fevers/chills/unintentional weight-loss.  Notes hair feels mildly brittle.  No palpitations/CP assoc with these sxs.  Pt is tolerating PO but has poor appetite.  Pt sees urology for frequent UTI's.  Sees neurology for poss migraines with intermittent sharp pains in varying locations.  Pt is tolerating secretions, airway intact, no resp distress.  Uvula midline, no tonsillar exudates/swelling, neck soft.  Mild TTP over anterior throat.  No palpable thyroid/nodules.  FROM of neck. Attending MD Taylor.  Agree with HPI as authored by PA.  Pt is a 31 yo female with no sig pmhx/diagnoses.  Pt present to ED now with sensation of FB in anterior throat x 1 mo.  Sxs originally positional but now constant.  Also notes raspy voice and night-sweats.  Denies fevers/chills.  Able to tolerate PO.  No regurgitation.  No work-up for throat mass as yet.  Has an appt with ENT on Thursday. No assoc fevers/chills/unintentional weight-loss.  Notes hair feels mildly brittle.  No palpitations/CP assoc with these sxs.  Pt is tolerating PO but has poor appetite.  Pt sees urology for frequent UTI's.  Sees neurology for poss migraines with intermittent sharp pains in varying locations.  Pt is tolerating secretions, airway intact, no resp distress.  Uvula midline, no tonsillar exudates/swelling, neck soft.  Mild TTP over anterior throat.  No palpable thyroid/nodules.  FROM of neck.    Pt advised of planned XR's for screening, GI cocktail and referral to ENT with whom she has an appt. No acute airway at this time.  Stable at time of signout pending imaging and medication admin.

## 2020-09-28 NOTE — ED PROVIDER NOTE - PHYSICAL EXAMINATION
A&Ox3, NAD. NCAT. PERRL, EOMI. Neck supple, no LAD. no palpable masses, no tenderness to palpation, c-spine with full active rom, Lungs CTAB. +S1S2, RRR, No m/r/g. Abd soft, NT/ND, +BS, no rebound or guarding. Extremities: cap refill <2, pulses in distal extremities 4+, no edema. Skin without rash. CN II-XII intact. Strength 5/5 UE/LE. Sensations intact throughout. Gait steady. A&Ox3, NAD. NCAT. PERRL, EOMI. Pt breathing comfortably, no retractions, tolerating secretions. Neck supple, no LAD. no palpable masses, no tenderness to palpation, c-spine with full active rom, Lungs CTAB. +S1S2, RRR, No m/r/g. Abd soft, NT/ND, +BS, no rebound or guarding. Extremities: cap refill <2, pulses in distal extremities 4+, no edema. Skin without rash. CN II-XII intact. Strength 5/5 UE/LE. Sensations intact throughout. Gait steady.

## 2020-09-28 NOTE — HISTORY OF PRESENT ILLNESS
[FreeTextEntry1] : 30 year old female with dysuria and urinary dribbling with recent MRI that does not appear to demonstrate a urethral diverticulum.  She appeared very anxious today and has been experiencing swelling of her neck with difficulty swallowing and breathing. She was instructed to go to the ER.\par \par As for her other symptoms, she is still having the burning near the urethra. No evidence of UTI on prior labs.\par \par She was recently diagnosed with osteoarthritis.

## 2020-09-28 NOTE — PHYSICAL EXAM
[General Appearance - Well Developed] : well developed [General Appearance - Well Nourished] : well nourished [Normal Appearance] : normal appearance [Well Groomed] : well groomed [FreeTextEntry1] : mild soft tissue neck swelling, no palpable thyroid nodules, non tender [Abdomen Soft] : soft [Abdomen Tenderness] : non-tender [Costovertebral Angle Tenderness] : no ~M costovertebral angle tenderness [Urinary Bladder Findings] : the bladder was normal on palpation [Skin Lesions] : no skin lesions [Arterial Pulses Normal] : the pedal pulses were normal [Edema] : no peripheral edema [] : no respiratory distress [Respiration, Rhythm And Depth] : normal respiratory rhythm and effort [Exaggerated Use Of Accessory Muscles For Inspiration] : no accessory muscle use [Affect] : the affect was normal [Mood] : the mood was normal [Normal Station and Gait] : the gait and station were normal for the patient's age [No Focal Deficits] : no focal deficits [No Palpable Adenopathy] : no palpable adenopathy

## 2020-09-28 NOTE — ED PROVIDER NOTE - OBJECTIVE STATEMENT
31 yo female with pmh frequent UTIs presenting with concern of FB sensation in throat x 1 month as well as raspy voice x 3-4 days and intermittent night sweats x 1-2 months. Pt states she noticed the FB sensation in her throat only when laying supine 1 month ago but for the past week and now feels as if her voice is raspy especially if she talks for too long. Pt states she has not has follow up with ENT this week for this particular problem but has not had a work up before. Pt endorses brittle hair and hair loss but no unintentional weight gain/loss, no fevers, chills, nausea, vomiting, abdominal pain, posterior throat pain, sick contacts, difficulty breathing or tolerating secretions, heart palpitations.

## 2020-09-28 NOTE — ASSESSMENT
[FreeTextEntry1] : 30F with dysuria, urinary dribbling and dyspareunia with likely negative mri for urethral diverticulum\par \par PLAN\par Mycoplasma urine PCR\par \par Rx pyridium\par \par RTC for cystoscopy

## 2020-09-28 NOTE — ED ADULT NURSE REASSESSMENT NOTE - NS ED NURSE REASSESS COMMENT FT1
Pt reports no change in throat sensation after medication administration, MD Allison aware, awaiting further instructions

## 2020-09-28 NOTE — ED PROVIDER NOTE - PATIENT PORTAL LINK FT
You can access the FollowMyHealth Patient Portal offered by [facility], [place] by registering at the following website: [email or web address]. By joining [employer]’s FollowMyHealth portal, you will also be able to view your health information using other applications (apps) compatible with our system.

## 2020-09-28 NOTE — ED PROVIDER NOTE - PROGRESS NOTE DETAILS
Niana Allison MD pt signed out to me pending xray of the neck and chest, pt w/ no stridor, she is handling her oral secretions, she has full ROM of neck, uvula is midline, she has evidence of posterior pharyngeal inflammation, recommending ranitidine, and ent follow up. sx likely c/w reflux

## 2020-09-28 NOTE — ED ADULT NURSE NOTE - OBJECTIVE STATEMENT
30 y.o. female presents to ED c/o throat swelling sensation x 1 month. Pt reports the sensation started when she was laying supine, but has now started more frequency. Pt reports raspy voice starting this week. Pt reports difficulty swallowing food and water, reporting "it feels like there is something stuck in there." Pt reports associated night sweats, brittle hair/raymundo loss, decreased appetite. Pt denies difficulty breathing/SOB, drooling, facial swelling, fevers, chills, weight gain/loss, N/V/D, abdominal pain, H/A, dizziness, CP, palpitations. Upon assessment, pt A&ox3, NAD, no increased WOB noted, no facial swelling noted, no drooling noted, speech coherent, no wheezing or stridor noted. Pt safety and comfort provided.

## 2020-09-28 NOTE — ED PROVIDER NOTE - NSFOLLOWUPINSTRUCTIONS_ED_ALL_ED_FT
- stay hydrated.   -take all home medications as prescribed  - follow up with ENT as scheduled this week  -follow up with PCP in 1-2 days  - return if symptoms worsen, fever, shortness of breath, difficulty breathing, inability to eat or drink and all other concerns.

## 2020-10-05 ENCOUNTER — APPOINTMENT (OUTPATIENT)
Dept: NEUROLOGY | Facility: CLINIC | Age: 30
End: 2020-10-05
Payer: MEDICAID

## 2020-10-05 ENCOUNTER — APPOINTMENT (OUTPATIENT)
Dept: OBGYN | Facility: CLINIC | Age: 30
End: 2020-10-05
Payer: MEDICAID

## 2020-10-05 VITALS
WEIGHT: 275 LBS | DIASTOLIC BLOOD PRESSURE: 79 MMHG | HEART RATE: 93 BPM | SYSTOLIC BLOOD PRESSURE: 116 MMHG | BODY MASS INDEX: 39.37 KG/M2 | HEIGHT: 70 IN

## 2020-10-05 VITALS
BODY MASS INDEX: 39.51 KG/M2 | DIASTOLIC BLOOD PRESSURE: 68 MMHG | SYSTOLIC BLOOD PRESSURE: 120 MMHG | HEIGHT: 70 IN | WEIGHT: 276 LBS

## 2020-10-05 VITALS — TEMPERATURE: 97.8 F

## 2020-10-05 DIAGNOSIS — M79.2 NEURALGIA AND NEURITIS, UNSPECIFIED: ICD-10-CM

## 2020-10-05 DIAGNOSIS — G43.109 MIGRAINE WITH AURA, NOT INTRACTABLE, W/OUT STATUS MIGRAINOSUS: ICD-10-CM

## 2020-10-05 DIAGNOSIS — M79.10 MYALGIA, UNSPECIFIED SITE: ICD-10-CM

## 2020-10-05 LAB
MYCOPLASMA GENITALIUM PCR: NEGATIVE
MYCOPLASMA GENITALIUM SRCE: NORMAL

## 2020-10-05 PROCEDURE — 99213 OFFICE O/P EST LOW 20 MIN: CPT

## 2020-10-05 PROCEDURE — 99214 OFFICE O/P EST MOD 30 MIN: CPT

## 2020-10-05 RX ORDER — PROPRANOLOL HYDROCHLORIDE 10 MG/1
10 TABLET ORAL
Qty: 120 | Refills: 0 | Status: DISCONTINUED | COMMUNITY
Start: 2020-08-20 | End: 2020-10-05

## 2020-10-05 RX ORDER — VITAMIN C, CALCIUM, IRON, VITAMIN D3, VITAMIN E, THIAMIN, RIBOFLAVIN, NIACINAMIDE, VITAMIN B6, FOLIC ACID, IODINE, ZINC, COPPER, DOCUSATE SODIUM
27-1 & 250 KIT
Qty: 60 | Refills: 11 | Status: DISCONTINUED | COMMUNITY
Start: 2017-05-30 | End: 2020-10-05

## 2020-10-05 RX ORDER — PHENAZOPYRIDINE HYDROCHLORIDE 100 MG/1
100 TABLET ORAL
Qty: 10 | Refills: 0 | Status: DISCONTINUED | COMMUNITY
Start: 2020-09-28 | End: 2020-10-05

## 2020-10-05 RX ORDER — NYSTATIN 100000 1/G
100000 POWDER TOPICAL 3 TIMES DAILY
Qty: 1 | Refills: 0 | Status: DISCONTINUED | COMMUNITY
Start: 2020-01-21 | End: 2020-10-05

## 2020-10-05 RX ORDER — NITROFURANTOIN (MONOHYDRATE/MACROCRYSTALS) 25; 75 MG/1; MG/1
100 CAPSULE ORAL
Qty: 14 | Refills: 0 | Status: DISCONTINUED | COMMUNITY
Start: 2020-08-19 | End: 2020-10-05

## 2020-10-05 RX ORDER — FAMOTIDINE 40 MG/1
40 TABLET, FILM COATED ORAL
Qty: 30 | Refills: 0 | Status: DISCONTINUED | COMMUNITY
Start: 2020-04-20 | End: 2020-10-05

## 2020-10-05 RX ORDER — PHENAZOPYRIDINE HYDROCHLORIDE 100 MG/1
100 TABLET ORAL
Qty: 10 | Refills: 0 | Status: DISCONTINUED | COMMUNITY
Start: 2020-08-22 | End: 2020-10-05

## 2020-10-05 RX ORDER — FLUTICASONE PROPIONATE 50 UG/1
50 SPRAY, METERED NASAL
Qty: 16 | Refills: 0 | Status: DISCONTINUED | COMMUNITY
Start: 2020-08-31 | End: 2020-10-05

## 2020-10-05 RX ORDER — CIPROFLOXACIN HYDROCHLORIDE 250 MG/1
250 TABLET, FILM COATED ORAL
Qty: 14 | Refills: 0 | Status: DISCONTINUED | COMMUNITY
Start: 2020-04-16 | End: 2020-10-05

## 2020-10-05 RX ORDER — SERTRALINE HYDROCHLORIDE 50 MG/1
50 TABLET, FILM COATED ORAL
Qty: 1 | Refills: 0 | Status: DISCONTINUED | COMMUNITY
Start: 2020-07-31 | End: 2020-10-05

## 2020-10-05 NOTE — HISTORY OF PRESENT ILLNESS
[FreeTextEntry1] : Heavy menses.   Did not start ale.  Anxiety did not start zoloft but started Cymbalta 30 DR 1 month ago from PCP.  Small improvement in anxiety.

## 2020-10-05 NOTE — REVIEW OF SYSTEMS
[Fatigue] : fatigue [Sight Problems] : sight problems [Eye Redness] : eye redness [Palpitations] : palpitations [Vomiting] : vomiting [Urgency] : urgency [Frequency] : frequency [Pelvic pain] : pelvic pain [Joint Stiffness] : joint stiffness [Back Pain] : back pain [Headache] : headache [Anxiety] : anxiety [Depression] : no depression [Sleep Disturbances] : sleep disturbances [Feeling Weak] : feeling weak [Hot Flashes] : hot flashes [Muscle Weakness] : muscle weakness [Easy Bruising] : easy bruising [Swollen Glands] : swollen glands [Negative] : Heme/Lymph

## 2020-10-05 NOTE — PLAN
[FreeTextEntry1] : Discussed menses and hormonal regulation as well as birth control options.  Discussed anxiety and increase cymbalta to 60mg dr. VAIL 1 month.  Offered psychiatrist and psychologist referral.

## 2020-10-07 PROBLEM — M79.10 MYALGIA: Status: ACTIVE | Noted: 2020-10-07

## 2020-10-07 PROBLEM — G43.109 MIGRAINE WITH AURA AND WITHOUT STATUS MIGRAINOSUS, NOT INTRACTABLE: Status: ACTIVE | Noted: 2020-05-29

## 2020-10-07 PROBLEM — M79.2 NEURITIS: Status: ACTIVE | Noted: 2020-10-07

## 2020-10-07 NOTE — HISTORY OF PRESENT ILLNESS
[FreeTextEntry1] : Ms. Beckham is a 30-year-old  female who has been evaluated in the past for migraine suspicion of sensory neuropathy fibromyalgia pain and electrophysiologic testing has been negative and today returns back for follow-up evaluation and continues to have some degree of anxiety and depression and has been under the care of a psychiatrist with psychotropic medication and has been feeling much better in the last 2 weeks and there has not been any recent episodes of migraine headaches dose he continues to have nonspecific generalized head pains without any focal characteristics and today endorsed several symptoms such as jaw pain teeth chattering flashing lights no significant throbbing or vascular type of headaches nausea or vomiting feels generalized weakness occasional diarrhea and has twisting feeling in the face and fingers diffuse myalgic pain.  Nonspecifically in the abdominal back and other joint areas occasional nipple discharge but had negative MRI of the brain in the past shaking of the body and hands clear vomiting intermittent visual obscurations crackling sensation in the ears and all the symptoms have been fluctuating and she believes that they may be related to her anxiety neurosis.\par \par She has been evaluated by rheumatologist  and work-up has largely been negative and was advised that she has arthritic arthritic disease in the back and neck and recent x-rays were unremarkable x-ray of the lumbar spine revealed straightening of the lordosis some degenerative changes at C5-C6 and C4-5 without clearly disc herniations or destructive changes and lumbar spine also showed degenerative changes at L4-5/L5-S1 without any other destructive changes and extensive blood test reports have been negative.  She recently had an ENT evaluation and had deviated septum.  She was also evaluated in the emergency room for pain in the jaw and slow swallowing and is being tested for swallowing studies and the urological evaluations have been unremarkable.\par \par I reviewed her medications and allergies.

## 2020-10-07 NOTE — DISCUSSION/SUMMARY
[FreeTextEntry1] : Opinion–the patient has multiple nonspecific symptoms and had extensive work-up by subspecialties and there has not been any neurological issues to date with a negative MRI of the brain in the past and I do not have any neurological diagnosis other than possible anxiety neurosis with history of depression and was advised to continue with her psychiatrist with appropriate treatment but return back to her internist for continued monitoring of her symptoms.  I reassured her that she has no evidence of neuropathy and EMG testing was unremarkable.  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  Weight loss was discussed.

## 2020-10-07 NOTE — PHYSICAL EXAM
[General Appearance - Alert] : alert [General Appearance - In No Acute Distress] : in no acute distress [Oriented To Time, Place, And Person] : oriented to person, place, and time [Impaired Insight] : insight and judgment were intact [Affect] : the affect was normal [Person] : oriented to person [Place] : oriented to place [Time] : oriented to time [Concentration Intact] : normal concentrating ability [Visual Intact] : visual attention was ~T not ~L decreased [Naming Objects] : no difficulty naming common objects [Repeating Phrases] : no difficulty repeating a phrase [Writing A Sentence] : no difficulty writing a sentence [Fluency] : fluency intact [Comprehension] : comprehension intact [Reading] : reading intact [Past History] : adequate knowledge of personal past history [Cranial Nerves Optic (II)] : visual acuity intact bilaterally,  visual fields full to confrontation, pupils equal round and reactive to light [Cranial Nerves Oculomotor (III)] : extraocular motion intact [Cranial Nerves Trigeminal (V)] : facial sensation intact symmetrically [Cranial Nerves Facial (VII)] : face symmetrical [Cranial Nerves Vestibulocochlear (VIII)] : hearing was intact bilaterally [Cranial Nerves Glossopharyngeal (IX)] : tongue and palate midline [Cranial Nerves Accessory (XI - Cranial And Spinal)] : head turning and shoulder shrug symmetric [Cranial Nerves Hypoglossal (XII)] : there was no tongue deviation with protrusion [Motor Tone] : muscle tone was normal in all four extremities [Motor Strength] : muscle strength was normal in all four extremities [No Muscle Atrophy] : normal bulk in all four extremities [Sensation Tactile Decrease] : light touch was intact [Abnormal Walk] : normal gait [Balance] : balance was intact [2+] : Ankle jerk left 2+ [FreeTextEntry1] : General examination is unremarkable.  She is moderately obese but in no acute distress.  There is no carotid bruit, thyromegaly or lymphadenopathy.  Chest is clear and heart sounds are normal.  Abdominal soft and there is no tenderness.  Cervical and lumbar spine range of motion is normal without any spine tenderness.  There is no Tinel sign at wrist bilaterally.  There are no meningeal signs.  There is no scalp tenderness.  Temporal artery pulsations are normal.  Eye examination is unremarkable.\par \par Neurological evaluation is completely normal as delineated. [Past-pointing] : there was no past-pointing [Tremor] : no tremor present [Plantar Reflex Right Only] : normal on the right [Plantar Reflex Left Only] : normal on the left

## 2020-10-07 NOTE — REVIEW OF SYSTEMS
[Feeling Poorly] : feeling poorly [Anxiety] : anxiety [Depression] : depression [As Noted in HPI] : as noted in HPI [Negative] : Heme/Lymph

## 2020-10-07 NOTE — DATA REVIEWED
[de-identified] : Electrophysiologic studies have been unremarkable without any evidence of neuropathy.

## 2020-10-08 ENCOUNTER — OUTPATIENT (OUTPATIENT)
Dept: OUTPATIENT SERVICES | Facility: HOSPITAL | Age: 30
LOS: 1 days | End: 2020-10-08
Payer: MEDICAID

## 2020-10-08 ENCOUNTER — APPOINTMENT (OUTPATIENT)
Dept: UROLOGY | Facility: CLINIC | Age: 30
End: 2020-10-08
Payer: MEDICAID

## 2020-10-08 VITALS — HEART RATE: 91 BPM | RESPIRATION RATE: 16 BRPM | DIASTOLIC BLOOD PRESSURE: 84 MMHG | SYSTOLIC BLOOD PRESSURE: 121 MMHG

## 2020-10-08 VITALS — TEMPERATURE: 95.4 F

## 2020-10-08 DIAGNOSIS — K08.409 PARTIAL LOSS OF TEETH, UNSPECIFIED CAUSE, UNSPECIFIED CLASS: Chronic | ICD-10-CM

## 2020-10-08 DIAGNOSIS — G89.29 PELVIC AND PERINEAL PAIN: ICD-10-CM

## 2020-10-08 DIAGNOSIS — R35.0 FREQUENCY OF MICTURITION: ICD-10-CM

## 2020-10-08 DIAGNOSIS — R10.2 PELVIC AND PERINEAL PAIN: ICD-10-CM

## 2020-10-08 LAB
BILIRUB UR QL STRIP: NORMAL
CLARITY UR: CLEAR
COLLECTION METHOD: NORMAL
GLUCOSE UR-MCNC: NORMAL
HCG UR QL: 0.2 EU/DL
HGB UR QL STRIP.AUTO: NORMAL
KETONES UR-MCNC: NORMAL
LEUKOCYTE ESTERASE UR QL STRIP: ABNORMAL
NITRITE UR QL STRIP: NORMAL
PH UR STRIP: 6.5
PROT UR STRIP-MCNC: NORMAL
SP GR UR STRIP: 1.03

## 2020-10-08 PROCEDURE — 52000 CYSTOURETHROSCOPY: CPT

## 2020-10-12 DIAGNOSIS — R10.2 PELVIC AND PERINEAL PAIN: ICD-10-CM

## 2020-10-12 DIAGNOSIS — R30.0 DYSURIA: ICD-10-CM

## 2020-10-12 DIAGNOSIS — N39.43 POST-VOID DRIBBLING: ICD-10-CM

## 2020-10-27 ENCOUNTER — APPOINTMENT (OUTPATIENT)
Dept: GASTROENTEROLOGY | Facility: CLINIC | Age: 30
End: 2020-10-27
Payer: MEDICAID

## 2020-10-27 VITALS
WEIGHT: 215 LBS | BODY MASS INDEX: 30.78 KG/M2 | TEMPERATURE: 98.4 F | SYSTOLIC BLOOD PRESSURE: 120 MMHG | DIASTOLIC BLOOD PRESSURE: 78 MMHG | OXYGEN SATURATION: 98 % | HEIGHT: 70 IN | HEART RATE: 92 BPM

## 2020-10-27 PROCEDURE — 99072 ADDL SUPL MATRL&STAF TM PHE: CPT

## 2020-10-27 PROCEDURE — 99213 OFFICE O/P EST LOW 20 MIN: CPT

## 2020-10-27 NOTE — ASSESSMENT
[FreeTextEntry1] : Impression and plan\par \par Patient came to the office today to arrange for upper endoscopy and colonoscopy for ongoing GI complaints. The risks benefits alternatives and limitations of procedure discussed. All make further suggestions after above testing is completed and advise accordingly.

## 2020-10-27 NOTE — HISTORY OF PRESENT ILLNESS
[FreeTextEntry1] : She returns for followup and discussion. Since her visit in May the patient has been feeling a bit better but still experiences some crampy epigastric pain reflux symptomatology and belching. Patient was seen by ENT for complaints of pharyngeal dysphagia. She was told that she had reflux disease. Patient continues to have left-sided abdominal pain and has noticed a small amount of bright red blood per rectum. Colonoscopy will be scheduled for this reason.

## 2020-11-09 ENCOUNTER — APPOINTMENT (OUTPATIENT)
Dept: OBGYN | Facility: CLINIC | Age: 30
End: 2020-11-09
Payer: MEDICAID

## 2020-11-09 VITALS
HEIGHT: 70 IN | SYSTOLIC BLOOD PRESSURE: 134 MMHG | WEIGHT: 277 LBS | DIASTOLIC BLOOD PRESSURE: 80 MMHG | BODY MASS INDEX: 39.65 KG/M2

## 2020-11-09 DIAGNOSIS — M54.5 LOW BACK PAIN: ICD-10-CM

## 2020-11-09 DIAGNOSIS — F41.9 ANXIETY DISORDER, UNSPECIFIED: ICD-10-CM

## 2020-11-09 DIAGNOSIS — G89.29 LOW BACK PAIN: ICD-10-CM

## 2020-11-09 PROCEDURE — 99213 OFFICE O/P EST LOW 20 MIN: CPT

## 2020-11-09 PROCEDURE — 99072 ADDL SUPL MATRL&STAF TM PHE: CPT

## 2020-11-10 ENCOUNTER — APPOINTMENT (OUTPATIENT)
Dept: GASTROENTEROLOGY | Facility: AMBULATORY MEDICAL SERVICES | Age: 30
End: 2020-11-10
Payer: MEDICAID

## 2020-11-10 PROCEDURE — 43239 EGD BIOPSY SINGLE/MULTIPLE: CPT

## 2020-11-10 PROCEDURE — 45380 COLONOSCOPY AND BIOPSY: CPT

## 2020-11-15 PROBLEM — M54.5 CHRONIC BILATERAL LOW BACK PAIN WITHOUT SCIATICA: Status: ACTIVE | Noted: 2020-05-29

## 2020-11-15 NOTE — REVIEW OF SYSTEMS
[Fatigue] : fatigue [Sore Throat] : sore throat [Abdominal Pain] : abdominal pain [Diarrhea] : diarrhea [Heartburn] : heartburn [Urgency] : urgency [Frequency] : frequency [Pelvic pain] : pelvic pain [Hot Flashes] : hot flashes [Negative] : Heme/Lymph

## 2020-11-15 NOTE — HISTORY OF PRESENT ILLNESS
[FreeTextEntry1] : Anxiety improved.  Still irritable and compulsive thoughts. Denies depression or trouble sleeping.  Having low back pain and concerned for colonoscopy scheduled for tomorrow.

## 2020-11-15 NOTE — PLAN
[FreeTextEntry1] : Discussed pt and weight loss for back pain.  Discussed increasing med for anxiety.

## 2020-12-03 ENCOUNTER — APPOINTMENT (OUTPATIENT)
Dept: GASTROENTEROLOGY | Facility: CLINIC | Age: 30
End: 2020-12-03
Payer: MEDICAID

## 2020-12-03 ENCOUNTER — APPOINTMENT (OUTPATIENT)
Dept: CARDIOLOGY | Facility: CLINIC | Age: 30
End: 2020-12-03

## 2020-12-03 VITALS
SYSTOLIC BLOOD PRESSURE: 110 MMHG | BODY MASS INDEX: 39.37 KG/M2 | HEIGHT: 70 IN | DIASTOLIC BLOOD PRESSURE: 80 MMHG | OXYGEN SATURATION: 98 % | WEIGHT: 275 LBS | TEMPERATURE: 97.8 F | HEART RATE: 95 BPM

## 2020-12-03 DIAGNOSIS — K60.2 ANAL FISSURE, UNSPECIFIED: ICD-10-CM

## 2020-12-03 PROCEDURE — 99213 OFFICE O/P EST LOW 20 MIN: CPT

## 2020-12-03 PROCEDURE — 99072 ADDL SUPL MATRL&STAF TM PHE: CPT

## 2020-12-03 NOTE — HISTORY OF PRESENT ILLNESS
[FreeTextEntry1] : Patient came to the office today for followup and discussion. She is feeling better with regard to dyspeptic complaints but has been having painful defecation with small amounts of blood on toilet tissue. Patient had recent endoscopy and colonoscopy results were reviewed. Patient has been watching her diet and taking over-the-counter H2 blocker with good relief. She inquired about taking prescription medication if required

## 2020-12-03 NOTE — ASSESSMENT
[FreeTextEntry1] : Impression and plan\par \par Patient came to the office today to discuss GI symptomatology. She is doing better from a dyspeptic point of view but had some recent anal bleeding. She has been noticing some hard stools and straining on the toilet. Physical examination reveals an obvious anal fissure in the 6:00 position. I suspect that this is of course a recent anal bleeding and discomfort. Patient will be prescribed topical treatment and counseling regarding straining excess hygiene etc. patient will increase fiber and fluid to ensure soft bowel movements. Further suggestions will follow.

## 2020-12-30 ENCOUNTER — APPOINTMENT (OUTPATIENT)
Dept: ORTHOPEDIC SURGERY | Facility: CLINIC | Age: 30
End: 2020-12-30
Payer: MEDICAID

## 2020-12-30 VITALS
TEMPERATURE: 97.8 F | WEIGHT: 278 LBS | SYSTOLIC BLOOD PRESSURE: 135 MMHG | HEIGHT: 70 IN | DIASTOLIC BLOOD PRESSURE: 82 MMHG | HEART RATE: 98 BPM | BODY MASS INDEX: 39.8 KG/M2

## 2020-12-30 DIAGNOSIS — M54.12 RADICULOPATHY, CERVICAL REGION: ICD-10-CM

## 2020-12-30 DIAGNOSIS — M51.26 OTHER INTERVERTEBRAL DISC DISPLACEMENT, LUMBAR REGION: ICD-10-CM

## 2020-12-30 PROCEDURE — 72050 X-RAY EXAM NECK SPINE 4/5VWS: CPT

## 2020-12-30 PROCEDURE — 99204 OFFICE O/P NEW MOD 45 MIN: CPT

## 2020-12-30 PROCEDURE — 99072 ADDL SUPL MATRL&STAF TM PHE: CPT

## 2020-12-30 NOTE — PHYSICAL EXAM
[Normal] : Gait: normal [SLR] : positive straight leg raise [UE/LE] : Sensory: Intact in bilateral upper & lower extremities [1+] : left patella 1+ [0] : left ankle jerk 0 [DP] : dorsalis pedis 2+ and symmetric bilaterally [Rad] : radial 2+ and symmetric bilaterally [Plantar Reflex Right Only] : absent on the right [Plantar Reflex Left Only] : absent on the left [DTR Reflexes Clonus Of Right Ankle (___ Beats)] : absent on the right [DTR Reflexes Clonus Of Left Ankle (___ Beats)] : absent on the left [de-identified] : The pt is awake, alert and oriented to self, place and time, is comfortable and in no acute distress. Gait examination reveals a narrow based, non-ataxic, non-antalgic gait. The pt can heel and toe walk without difficulty. No rashes or ecchymotic lesions noted over the neck, back and lower extremities bilaterally. No obvious abnormal spinal curvature in the sagittal and coronal planes. No tenderness over the cervical, thoracic or lumbar spine, paraspinal or upper and lower extremity musculature. There is no sacroiliac tenderness bilaterally. No tenderness over the greater trochanter bilaterally. No atrophy or abnormal movements noted in the upper or lower extremities bilaterally. No swelling seen in the upper or lower extremities bilaterally. No joint laxity noted in the upper and lower extremity joints bilaterally.\par No cervical lymphadenopathy noted anteriorly. \par Cervical spine range of motion is pain free. Forward flexion of chin to chest, extension 30 degrees, rotation laterally 40 degrees bilaterally. Full range of motion of both shoulders. Negative Spurling's sign bilaterally. There is a negative Neer's sign and Hawkin's sign bilaterally. \par Lumbar spine range of motion is pain free. Forward flexion to her knees, and extension 30 degrees with discomfort. Range of motion of hip is internal rotation 20 degrees,  30° external rotation without pain\par Negative straight leg raise to 60° in the supine position. No groin pain with hip internal rotation, negative GORDO test bilaterally. There are 2+ DP pulses bilaterally. There is a negative Babinski sign and no clonus bilaterally in the upper or lower extremities. [de-identified] : 4 views cervical spine obtained today demonstrate no significant scoliosis.  Normal cervical lordosis.  Disc heights appear well preserved with no significant degenerative changes fractures or instability identified.

## 2020-12-30 NOTE — DISCUSSION/SUMMARY
[Medication Risks Reviewed] : Medication risks reviewed [de-identified] : The patient has a known diagnosis of disc herniations at the L5-S1 as well as L4-5 from prior MRI in 2015.  She has reported increasing leg symptoms in her feet as well as increasing neck and arm symptoms.  Given her prior history of lumbar disc herniation I recommended updated MRI of the lumbar spine for further evaluation of her condition.  Given her increased neck pain as well as hand symptoms recommended MRI of the cervical spine to assess for disc herniation of the cervical spine as well.  I will see her back after the MRI to discuss further treatment options.  She has an extensive evaluation including a rheumatologist in neurologist which did not demonstrate any rheumatologic abnormalities or any neuropathy based on nerve testing and blood testing.  We will evaluate her symptoms further with an MRI of the cervical spine and lumbar spine.\par \par The patient was educated regarding their condition, treatment options as well as prescribed course of treatment. \par Risks and benefits as well as alternatives to the proposed treatment were also provided to the patient \par They were given the opportunity to have all their questions answered to their satisfaction.\par \par Vital signs were reviewed with the patient and the patient was instructed to followup with their primary care provider for further management.\par \par Healthy lifestyle recommendations were also made including a tobacco free lifestyle, proper diet, and weight control.
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
## 2020-12-30 NOTE — CONSULT LETTER
[Dear  ___] : Dear  [unfilled], [Consult Letter:] : I had the pleasure of evaluating your patient, [unfilled]. [FreeTextEntry2] : Mary Ceron [FreeTextEntry1] : Thank you for this referral. I have enclosed my note for your review. Please feel free to contact my office if you have additional questions regarding this patient.\par \par Regards,\par Ashvin Carmona MD, FACS, FAAOS\par \par  of Orthopaedic Surgery\par [facility] [employer]\par Spinal Reconstruction Surgery\par Minimally Invasive Spinal Surgery\par [facility]

## 2021-01-03 ENCOUNTER — APPOINTMENT (OUTPATIENT)
Dept: MRI IMAGING | Facility: CLINIC | Age: 31
End: 2021-01-03
Payer: MEDICAID

## 2021-01-03 ENCOUNTER — OUTPATIENT (OUTPATIENT)
Dept: OUTPATIENT SERVICES | Facility: HOSPITAL | Age: 31
LOS: 1 days | End: 2021-01-03
Payer: MEDICAID

## 2021-01-03 DIAGNOSIS — K08.409 PARTIAL LOSS OF TEETH, UNSPECIFIED CAUSE, UNSPECIFIED CLASS: Chronic | ICD-10-CM

## 2021-01-03 DIAGNOSIS — M54.12 RADICULOPATHY, CERVICAL REGION: ICD-10-CM

## 2021-01-03 PROCEDURE — 72141 MRI NECK SPINE W/O DYE: CPT

## 2021-01-03 PROCEDURE — 72148 MRI LUMBAR SPINE W/O DYE: CPT

## 2021-01-03 PROCEDURE — 72141 MRI NECK SPINE W/O DYE: CPT | Mod: 26

## 2021-01-03 PROCEDURE — 72148 MRI LUMBAR SPINE W/O DYE: CPT | Mod: 26

## 2021-01-07 ENCOUNTER — APPOINTMENT (OUTPATIENT)
Dept: UROLOGY | Facility: CLINIC | Age: 31
End: 2021-01-07

## 2021-01-13 ENCOUNTER — APPOINTMENT (OUTPATIENT)
Dept: ORTHOPEDIC SURGERY | Facility: CLINIC | Age: 31
End: 2021-01-13
Payer: MEDICAID

## 2021-01-13 VITALS
DIASTOLIC BLOOD PRESSURE: 79 MMHG | BODY MASS INDEX: 39.94 KG/M2 | RESPIRATION RATE: 13 BRPM | OXYGEN SATURATION: 98 % | TEMPERATURE: 97.6 F | HEIGHT: 70 IN | WEIGHT: 279 LBS | HEART RATE: 86 BPM | SYSTOLIC BLOOD PRESSURE: 112 MMHG

## 2021-01-13 DIAGNOSIS — M51.37 OTHER INTERVERTEBRAL DISC DEGENERATION, LUMBOSACRAL REGION: ICD-10-CM

## 2021-01-13 DIAGNOSIS — G89.29 CERVICALGIA: ICD-10-CM

## 2021-01-13 DIAGNOSIS — M54.16 RADICULOPATHY, LUMBAR REGION: ICD-10-CM

## 2021-01-13 DIAGNOSIS — M54.2 CERVICALGIA: ICD-10-CM

## 2021-01-13 PROCEDURE — 99072 ADDL SUPL MATRL&STAF TM PHE: CPT

## 2021-01-13 PROCEDURE — 99214 OFFICE O/P EST MOD 30 MIN: CPT

## 2021-01-13 NOTE — REASON FOR VISIT
[Follow-Up Visit] : a follow-up visit for [FreeTextEntry2] : Patient is presenting for MRI review of the lumbar and cervical spine

## 2021-01-13 NOTE — PHYSICAL EXAM
[Normal] : Gait: normal [SLR] : positive straight leg raise [UE/LE] : Sensory: Intact in bilateral upper & lower extremities [1+] : left patella 1+ [0] : left ankle jerk 0 [DP] : dorsalis pedis 2+ and symmetric bilaterally [Rad] : radial 2+ and symmetric bilaterally [Plantar Reflex Right Only] : absent on the right [Plantar Reflex Left Only] : absent on the left [DTR Reflexes Clonus Of Left Ankle (___ Beats)] : absent on the left [DTR Reflexes Clonus Of Right Ankle (___ Beats)] : absent on the right [de-identified] : The pt is awake, alert and oriented to self, place and time, is comfortable and in no acute distress. Gait examination reveals a narrow based, non-ataxic, non-antalgic gait. The pt can heel and toe walk without difficulty. No rashes or ecchymotic lesions noted over the neck, back and lower extremities bilaterally. No obvious abnormal spinal curvature in the sagittal and coronal planes. No tenderness over the cervical, thoracic or lumbar spine, paraspinal or upper and lower extremity musculature. There is no sacroiliac tenderness bilaterally. No tenderness over the greater trochanter bilaterally. No atrophy or abnormal movements noted in the upper or lower extremities bilaterally. No swelling seen in the upper or lower extremities bilaterally. No joint laxity noted in the upper and lower extremity joints bilaterally.\par No cervical lymphadenopathy noted anteriorly. \par Cervical spine range of motion is pain free. Forward flexion of chin to chest, extension 30 degrees, rotation laterally 40 degrees bilaterally. Full range of motion of both shoulders. Negative Spurling's sign bilaterally. There is a negative Neer's sign and Hawkin's sign bilaterally. \par Lumbar spine range of motion is pain free. Forward flexion to her knees, and extension 30 degrees with discomfort. Range of motion of hip is internal rotation 20 degrees,  30° external rotation without pain\par Negative straight leg raise to 60° in the supine position. No groin pain with hip internal rotation, negative GORDO test bilaterally. There are 2+ DP pulses bilaterally. There is a negative Babinski sign and no clonus bilaterally in the upper or lower extremities. [de-identified] : EXAM: MR SPINE CERVICAL\par \par PROCEDURE DATE: 01/03/2021\par \par INTERPRETATION: Clinical indication: Radiculopathy. Neck pain\par \par Comparison: None.\par \par Technique:\par MRI of the cervical spine.\par Intravenous contrast: None.\par \par FINDINGS:\par \par There is a minimal reversal of the cervical lordosis centered at C4-C5. The heights of the vertebral bodies are preserved. The disc heights are preserved. The marrow signal is normal. The cord signal is normal. Cervical medullary junction appears unremarkable.\par \par The findings at the following levels are:\par \par C1-C2: Normal.\par \par C2-3: Normal.\par \par C3-4: There is a tiny central disc protrusion.\par \par C4-5: : There is a tiny central disc protrusion which indents the ventral thecal sac and comes close to the ventral cord. There is minimal bilateral uncovertebral arthropathy.\par \par C5-C6: There is a minimal disc bulge which minimally indents the ventral thecal sac\par \par C6-7: Normal.\par \par C7-T1: Normal.\par \par Impression: Tiny central disc protrusions at C3-4 and C4-5 and minimal degenerative changes as above. No spinal canal stenosis or foraminal narrowing.\par \par NATALIE HOLLOWAY MD; Attending Radiologist\par This document has been electronically signed. Jan 5 2021 11:45AM\par _________________\par \par EXAM: MR SPINE LUMBAR\par \par \par PROCEDURE DATE: 01/03/2021\par \par INTERPRETATION: LUMBOSACRAL SPINE MRI\par \par CLINICAL INFORMATION: Lower back and leg pain.\par TECHNIQUE: Multiplanar, multisequence MR imaging was obtained of the lumbosacral spine.\par FINDINGS:\par \par DISC LEVEL EVALUATION:\par \par T11/T12: Evaluated on sagittal imaging. No spinal cord or neural foraminal narrowing\par T12/L1: No central canal or foraminal narrowing. Lateral recesses are preserved.\par L1/L2: No spinal cord or neural foraminal narrowing. Lateral recesses are preserved.\par L2/L3: No spinal cord or neural foraminal narrowing. Lateral recesses are preserved.\par L3/L4: Small synovial cyst located posterior to the right facet joint outside of the spinal canal. No spinal canal or neural foraminal narrowing. Lateral recesses are preserved.\par L4/L5: Mild facet degenerative change. Mild central/right paracentral disc protrusion No central canal or foraminal narrowing. Mild right lateral recess narrowing.\par L5/S1: Mild bilateral facet productive change. Posterior osseous ridging that extends into the left foramina. Mild left foraminal narrowing. No central canal or right foraminal narrowing. Lateral recesses are preserved\par \par SPINAL ALIGNMENT: Within normal limits. Disc spaces are mildly narrowed at L4-5 and L5-S1.\par DISTAL CORD AND CONUS: The distal cord terminates at L1\par SI JOINTS: Mild sclerosis on the iliac side of the left sacroiliac joint and to a lesser degree the right sacral iliac joint.\par MARROW: Mild degenerative marrow edema surrounding the L5-S1 disc space. No fractures identified.\par PARASPINAL MUSCLE AND SOFT TISSUES: Within normal limits.\par INTRAABDOMINAL/INTRAPELVIC SOFT TISSUES: Within normal limits.\par \par IMPRESSION:\par Mild multilevel spondylosis of the lower lumbar spine as described above.\par \par LUCI BAZAN MD; Resident Interventional Radiology\par This document has been electronically signed.\par ERNESTO DUENAS MD; Attending Radiologist\par This document has been electronically signed. Jan 4 2021 12:28PM

## 2021-01-13 NOTE — HISTORY OF PRESENT ILLNESS
[de-identified] : 30 year old female presenting for MRI review of the lumbar spine and cervical spine. She denies any changes in pain. She has not had any physical therapy; she is taking tylenol for pain relief. She was undergoing PT for the lumbar spine which helped her significantly. Her pain level for both the cervical spine are intermittent and is 5/10 on most days.

## 2021-01-13 NOTE — DISCUSSION/SUMMARY
[Medication Risks Reviewed] : Medication risks reviewed [de-identified] : The patient has had an extensive work-up seeing a rheumatologist as well as neurologist and nerve testing was performed.  Based on my evaluation of the MRI cervical spine and lumbar spine I do not see any significant pathology in her cervical spine MRI to explain her symptoms.  She may have minimal disc degeneration and disc protrusions noted on the MRI but no neural compression is noted and no significant changes are noted in the spinal cord as well that are visualized.  At this time recommended conservative treatment plan with acupuncture physical therapy and trial of diclofenac which she can take with caution given her GI discomfort and methocarbamol as a muscle relaxant.  The same medications can be used for her lower back as well where she has more significant changes on MRI and x-ray which include disc degeneration L5-S1 and to a lesser extent L4-5.  Given her young age recommended continued conservative treatment with acupuncture physical therapy and medications.  If her symptoms progress in the lumbar spine bilateral L5 transforaminal epidural steroid injection may be considered in the future.  She understands that the most definitive treatment for her lumbar spine pathology and symptoms may be L4-S1 spinal fusion though given her young age that is not advisable for her at this time.\par \par Weight loss program was strongly recommended and dietary changes were recommended as well.  I will see her back on an as-needed basis for her symptoms.\par \par The patient was educated regarding their condition, treatment options as well as prescribed course of treatment. \par Risks and benefits as well as alternatives to the proposed treatment were also provided to the patient \par They were given the opportunity to have all their questions answered to their satisfaction.\par \par Vital signs were reviewed with the patient and the patient was instructed to followup with their primary care provider for further management.\par \par Healthy lifestyle recommendations were also made including a tobacco free lifestyle, proper diet, and weight control.

## 2021-02-08 ENCOUNTER — APPOINTMENT (OUTPATIENT)
Dept: OBGYN | Facility: CLINIC | Age: 31
End: 2021-02-08
Payer: MEDICAID

## 2021-02-08 VITALS
SYSTOLIC BLOOD PRESSURE: 115 MMHG | HEIGHT: 70 IN | DIASTOLIC BLOOD PRESSURE: 72 MMHG | BODY MASS INDEX: 39.94 KG/M2 | WEIGHT: 279 LBS

## 2021-02-08 PROCEDURE — 99072 ADDL SUPL MATRL&STAF TM PHE: CPT

## 2021-02-08 PROCEDURE — 99213 OFFICE O/P EST LOW 20 MIN: CPT

## 2021-02-08 RX ORDER — HYDROCORTISONE 25 MG/G
2.5 CREAM TOPICAL TWICE DAILY
Qty: 30 | Refills: 0 | Status: DISCONTINUED | COMMUNITY
Start: 2020-12-03 | End: 2021-02-08

## 2021-02-08 RX ORDER — SODIUM SULFATE, POTASSIUM SULFATE, MAGNESIUM SULFATE 17.5; 3.13; 1.6 G/ML; G/ML; G/ML
17.5-3.13-1.6 SOLUTION, CONCENTRATE ORAL
Qty: 1 | Refills: 0 | Status: DISCONTINUED | COMMUNITY
Start: 2020-10-27 | End: 2021-02-08

## 2021-02-08 RX ORDER — CETIRIZINE HYDROCHLORIDE 10 MG/1
10 TABLET, COATED ORAL
Qty: 30 | Refills: 0 | Status: DISCONTINUED | COMMUNITY
Start: 2020-08-31 | End: 2021-02-08

## 2021-02-08 RX ORDER — HYDROCORTISONE 10 MG/G
1 CREAM TOPICAL
Qty: 1 | Refills: 0 | Status: DISCONTINUED | COMMUNITY
Start: 2020-12-03 | End: 2021-02-08

## 2021-02-08 RX ORDER — METHOCARBAMOL 500 MG/1
500 TABLET, FILM COATED ORAL 3 TIMES DAILY
Qty: 30 | Refills: 0 | Status: DISCONTINUED | COMMUNITY
Start: 2021-01-13 | End: 2021-02-08

## 2021-02-08 RX ORDER — LANSOPRAZOLE 30 MG/1
30 CAPSULE, DELAYED RELEASE ORAL DAILY
Qty: 90 | Refills: 3 | Status: DISCONTINUED | COMMUNITY
Start: 2020-12-03 | End: 2021-02-08

## 2021-02-08 RX ORDER — DIAZEPAM 100 %
POWDER (GRAM) MISCELLANEOUS
Qty: 20 | Refills: 3 | Status: DISCONTINUED | COMMUNITY
Start: 2020-10-08 | End: 2021-02-08

## 2021-02-08 NOTE — HISTORY OF PRESENT ILLNESS
[Patient reported PAP Smear was normal] : Patient reported PAP Smear was normal [FreeTextEntry1] : 30 yo presents with complaint of white vaginal discharge and itching x 5 days.  Patient states she was recently treated for UTI with Cipro. [PapSmeardate] : 7/10/2020 [LMPDate] : 1/14/21

## 2021-02-08 NOTE — PHYSICAL EXAM
[Appropriately responsive] : appropriately responsive [Alert] : alert [No Acute Distress] : no acute distress [Oriented x3] : oriented x3 [Labia Majora] : normal [Labia Minora] : normal [Normal] : normal [Discharge] : a  ~M vaginal discharge was present [Scant] : scant [White] : white [Thick] : thick

## 2021-02-09 LAB
CANDIDA VAG CYTO: NOT DETECTED
G VAGINALIS+PREV SP MTYP VAG QL MICRO: NOT DETECTED
T VAGINALIS VAG QL WET PREP: NOT DETECTED

## 2021-02-10 LAB — BACTERIA UR CULT: NORMAL

## 2021-02-26 ENCOUNTER — APPOINTMENT (OUTPATIENT)
Dept: ENDOCRINOLOGY | Facility: CLINIC | Age: 31
End: 2021-02-26

## 2021-03-01 NOTE — ED ADULT NURSE NOTE - CAS TRG GENERAL NORM CIRC DET
OT Treatment Note       03/01/21 8414   Pain Assessment   Pain Assessment Tool 0-10   Pain Score 7   Pain Location/Orientation Orientation: Right;Orientation: Left; Location: Hip   Pain Onset/Description Onset: Ongoing   Hospital Pain Intervention(s) Repositioned;Rest;Ambulation/increased activity  (RN administered pain meds)   Restrictions/Precautions   Precautions Fall Risk;Pain   RLE Weight Bearing Per Order WBAT   LLE Weight Bearing Per Order WBAT   ROM Restrictions No   Lifestyle   Autonomy "My vision feels foggy," following SPT and brief sitting EOB     Grooming   Able To Wash/Dry Hands   Findings CGA standing sinkside   Sit to Lying   Type of Assistance Needed Physical assistance   Amount of Physical Assistance Provided 50%-74%   Comment maxA for BLE 2/2 lightheadedness, "foggy vision"   Sit to Lying CARE Score 2   Lying to Sitting on Side of Bed   Type of Assistance Needed Physical assistance   Amount of Physical Assistance Provided 25%-49%   Comment ModA for RLE; increased time, mod vc's for technique   Lying to Sitting on Side of Bed CARE Score 3   Sit to Stand   Type of Assistance Needed Incidental touching   Amount of Physical Assistance Provided No physical assistance   Comment 5 reps with CGA using RW, min vc's for proper hand placement   Sit to Stand CARE Score 4   Bed-Chair Transfer   Type of Assistance Needed Incidental touching   Amount of Physical Assistance Provided No physical assistance   Comment CGA using RW, min vc's for proper hand placement   Chair/Bed-to-Chair Transfer CARE Score 4   Toileting Hygiene   Type of Assistance Needed Physical assistance   Amount of Physical Assistance Provided Less than 25%   Comment Ebony to fully pull pants down; able to complete hygiene seated and able to hike pants over hips with CGA using unilateral UE support on RW   Toileting Hygiene CARE Score 3   Toilet Transfer   Type of Assistance Needed Incidental touching   Amount of Physical Assistance Provided No physical assistance   Comment CGA RW<->commode with vc's for proper hand placement and management of RW during turn   Toilet Transfer CARE Score 4   Cognition   Overall Cognitive Status [facility]   Arousal/Participation Alert; Cooperative   Attention Attends with cues to redirect   Orientation Level Oriented X4   Memory Decreased short term memory;Decreased recall of precautions   Following Commands Follows one step commands with increased time or repetition   Activity Tolerance   Activity Tolerance Patient limited by pain; Other (Comment)  (limited by lightheadedness this session; RN aware)   Medical Staff Made Aware RN made aware   Other Comments   Assessment Following STS and SPT WC<->EOB and brief period sitting EOB, pt reporting nausea, increasing lightheadedness, and "foggy vision " Pt assisted supine in bed, PCA present to assist with vitals; vitals WFL  RN later present to further assess  Symptoms resolving after ~10minutes and pt requesting to continue session  Assessment   Treatment Assessment Pt seen for 60min skilled OT session focusing on functional transfers, functional mobility, toileting and grooming tasks and bed mobility  Pt with episode of lightheadedness, nausea and "foggy vision" that resolved ~10minutes; RN made aware and present to further assess pt  Despite episode, pt able to continue with session  Pt completing functional transfers with CGA using RW and completing functional mobility ~5' within bathroom with CGA using RW  See above for further details  OT to continue POC to address LB dressing, functional transfers/mobility, standing tolerance/balance, and BUE strengthening to maximize (I) and safety prior to d/c home  Prognosis Good   Problem List Decreased strength;Decreased range of motion;Decreased endurance; Impaired balance;Decreased mobility; Decreased coordination;Decreased cognition;Decreased safety awareness;Orthopedic restrictions;Pain   Barriers to Discharge Decreased caregiver support; Inaccessible home environment   Plan   Treatment/Interventions ADL retraining;Functional transfer training; Therapeutic exercise; Endurance training;Patient/family training;Equipment eval/education; Bed mobility; Compensatory technique education;Spoke to nursing   Recommendation   OT Discharge Recommendation Return to previous environment with social support   Equipment Recommended   (TBD)   OT Therapy Minutes   OT Time In 1330   OT Time Out 1430   OT Total Time (minutes) 60   OT Mode of treatment - Individual (minutes) 60   OT Mode of treatment - Concurrent (minutes) 0   OT Mode of treatment - Group (minutes) 0   OT Mode of treatment - Co-treat (minutes) 0   OT Mode of Treatment - Total time(minutes) 60 minutes   OT Cumulative Minutes 210   Therapy Time missed   Time missed?  No Capillary refill less/equal to 2 seconds/Strong peripheral pulses

## 2021-03-12 ENCOUNTER — APPOINTMENT (OUTPATIENT)
Dept: OBGYN | Facility: CLINIC | Age: 31
End: 2021-03-12
Payer: MEDICAID

## 2021-03-12 ENCOUNTER — RX RENEWAL (OUTPATIENT)
Age: 31
End: 2021-03-12

## 2021-03-12 VITALS
DIASTOLIC BLOOD PRESSURE: 80 MMHG | HEIGHT: 70 IN | WEIGHT: 280 LBS | SYSTOLIC BLOOD PRESSURE: 132 MMHG | BODY MASS INDEX: 40.09 KG/M2

## 2021-03-12 DIAGNOSIS — K21.9 GASTRO-ESOPHAGEAL REFLUX DISEASE W/OUT ESOPHAGITIS: ICD-10-CM

## 2021-03-12 PROCEDURE — 99213 OFFICE O/P EST LOW 20 MIN: CPT

## 2021-03-12 PROCEDURE — 36415 COLL VENOUS BLD VENIPUNCTURE: CPT

## 2021-03-12 PROCEDURE — 99072 ADDL SUPL MATRL&STAF TM PHE: CPT

## 2021-03-12 RX ORDER — FLUCONAZOLE 150 MG/1
150 TABLET ORAL
Qty: 1 | Refills: 0 | Status: DISCONTINUED | COMMUNITY
Start: 2021-02-08 | End: 2021-03-12

## 2021-03-12 RX ORDER — CLOTRIMAZOLE 10 MG/G
1 CREAM TOPICAL
Qty: 1 | Refills: 0 | Status: DISCONTINUED | COMMUNITY
Start: 2021-02-08 | End: 2021-03-12

## 2021-03-12 RX ORDER — DICLOFENAC SODIUM 50 MG/1
50 TABLET, DELAYED RELEASE ORAL
Qty: 30 | Refills: 0 | Status: DISCONTINUED | COMMUNITY
Start: 2021-01-13 | End: 2021-03-12

## 2021-03-12 RX ORDER — AZELASTINE HYDROCHLORIDE 137 UG/1
137 SPRAY, METERED NASAL
Qty: 30 | Refills: 0 | Status: DISCONTINUED | COMMUNITY
Start: 2020-07-29 | End: 2021-03-12

## 2021-03-14 LAB — PROLACTIN SERPL-MCNC: 20.2 NG/ML

## 2021-03-14 NOTE — HISTORY OF PRESENT ILLNESS
[FreeTextEntry1] : Right abdominal tenderness.  Not related to eating or movement. Clear nipple discharge bilateral since delivery. Desires refill ocp.  Anxiety improved on meds.  C/o reflux.

## 2021-03-14 NOTE — PHYSICAL EXAM
[Appropriately responsive] : appropriately responsive [Alert] : alert [No Acute Distress] : no acute distress [No Lymphadenopathy] : no lymphadenopathy [Soft] : soft [Non-tender] : non-tender [Non-distended] : non-distended [No HSM] : No HSM [No Lesions] : no lesions [No Mass] : no mass [Oriented x3] : oriented x3 [FreeTextEntry6] : small clear discharge bilaterally. [Examination Of The Breasts] : a normal appearance [No Masses] : no breast masses were palpable [Labia Majora] : normal [Labia Minora] : normal [Normal] : normal [Uterine Adnexae] : normal

## 2021-03-14 NOTE — REVIEW OF SYSTEMS
[Fatigue] : fatigue [Sore Throat] : sore throat [Abdominal Pain] : abdominal pain [Diarrhea] : diarrhea [Heartburn] : heartburn [Urgency] : urgency [Frequency] : frequency [Pelvic pain] : pelvic pain [Nipple Discharge] : nipple discharge [Hot Flashes] : hot flashes [Negative] : Heme/Lymph

## 2021-03-17 ENCOUNTER — NON-APPOINTMENT (OUTPATIENT)
Age: 31
End: 2021-03-17

## 2021-03-18 NOTE — ED PROVIDER NOTE - NS_ATTENDINGSCRIBE_ED_ALL_ED
30 minute call took place.  Writer spoke with Maty at [facility].  She is working on CMN for oxygen.  Per Maty some records from [facility] were not received into their system once they became [facility].  Writer reiterated previous messages/orders/ notes. Etc.  Also, Maty requested that writer resend order from 9/28/2015 directly to her. Faxed as requested. Confirmation received.  Maty will continue to review and supply an appropriate CMN if still needed.  Writer will close this encounter.  Per Maty she will reach out and call if anything further needed from provider office.   I personally performed the service described in the documentation recorded by the scribe in my presence, and it accurately and completely records my words and actions.

## 2021-04-21 NOTE — ED ADULT NURSE NOTE - PAIN RATING/NUMBER SCALE (0-10): ACTIVITY
Here for follow up stones. She had R flank pain on Sunday night but feels she passed the stone .Irene Burns LPN   
0

## 2021-06-08 ENCOUNTER — RX RENEWAL (OUTPATIENT)
Age: 31
End: 2021-06-08
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
## 2021-08-10 ENCOUNTER — RESULT CHARGE (OUTPATIENT)
Age: 31
End: 2021-08-10

## 2021-08-10 ENCOUNTER — APPOINTMENT (OUTPATIENT)
Dept: OBGYN | Facility: CLINIC | Age: 31
End: 2021-08-10
Payer: MEDICAID

## 2021-08-10 VITALS
DIASTOLIC BLOOD PRESSURE: 88 MMHG | BODY MASS INDEX: 41.37 KG/M2 | WEIGHT: 289 LBS | HEIGHT: 70 IN | SYSTOLIC BLOOD PRESSURE: 138 MMHG

## 2021-08-10 DIAGNOSIS — Z01.419 ENCOUNTER FOR GYNECOLOGICAL EXAMINATION (GENERAL) (ROUTINE) W/OUT ABNORMAL FINDINGS: ICD-10-CM

## 2021-08-10 DIAGNOSIS — N92.0 EXCESSIVE AND FREQUENT MENSTRUATION WITH REGULAR CYCLE: ICD-10-CM

## 2021-08-10 LAB
HCG UR QL: NEGATIVE
QUALITY CONTROL: YES

## 2021-08-10 PROCEDURE — 99395 PREV VISIT EST AGE 18-39: CPT

## 2021-08-10 RX ORDER — NORETHINDRONE 0.35 MG/1
0.35 TABLET ORAL
Qty: 84 | Refills: 1 | Status: DISCONTINUED | COMMUNITY
Start: 2021-03-12 | End: 2021-08-10

## 2021-08-10 RX ORDER — PANTOPRAZOLE 40 MG/1
40 TABLET, DELAYED RELEASE ORAL DAILY
Qty: 1 | Refills: 1 | Status: DISCONTINUED | COMMUNITY
Start: 2021-03-12 | End: 2021-08-10

## 2021-08-10 RX ORDER — NORETHINDRONE 0.35 MG/1
0.35 TABLET ORAL
Qty: 1 | Refills: 5 | Status: DISCONTINUED | COMMUNITY
Start: 2020-07-10 | End: 2021-08-10

## 2021-08-10 NOTE — DISCUSSION/SUMMARY
[FreeTextEntry1] : Other progesterone only birth control methods discussed with patient including Mirena IUD, Nexplanon and Depo injections.  At this time, she prefers to try the Mirena IUD.  All questions and concerns addressed.\par \par Duloxetine dosage changed to 90 mg daily; Patient to follow up in 6 month or PRN

## 2021-08-10 NOTE — HISTORY OF PRESENT ILLNESS
[Patient reported PAP Smear was normal] : Patient reported PAP Smear was normal [N] : Patient reports normal menses [Oral Contraceptive] : uses oral contraception pills [Y] : Patient is sexually active [Monogamous (Male Partner)] : is monogamous with a male partner [FreeTextEntry1] : 32 yo presents for annual exam and pap smear.  Would like to discuss change her OCP's due to frequent miss dosages. Desires to decrease dosage of Duloxetine.  She states since her medication was increased to 120mg she has been experiencing excessive sweating otherwise she feel better on medication. [PapSmeardate] : 7/2020 [LMPDate] : 7/14/21 [FreeTextEntry4] : Patient would like Gonorrhea and Chlamydia screening today.  Declines other STI blood work at this time\par

## 2021-08-10 NOTE — PLAN
[FreeTextEntry1] : Urine pregnancy test in office: Negative\par RTO in 1 week for Mirena IUD placement

## 2021-08-10 NOTE — COUNSELING
[Nutrition/ Exercise/ Weight Management] : nutrition, exercise, weight management [Body Image] : body image [Breast Self Exam] : breast self exam [Contraception/ Emergency Contraception/ Safe Sexual Practices] : contraception, emergency contraception, safe sexual practices [STD (testing, results, tx)] : STD (testing, results, tx)

## 2021-08-10 NOTE — PHYSICAL EXAM
[Appropriately responsive] : appropriately responsive [Alert] : alert [No Acute Distress] : no acute distress [Soft] : soft [Non-tender] : non-tender [Oriented x3] : oriented x3 [Examination Of The Breasts] : a normal appearance [No Masses] : no breast masses were palpable [Labia Majora] : normal [Labia Minora] : normal [Normal] : normal [Uterine Adnexae] : normal

## 2021-08-11 LAB
C TRACH RRNA SPEC QL NAA+PROBE: NOT DETECTED
HPV HIGH+LOW RISK DNA PNL CVX: NOT DETECTED
N GONORRHOEA RRNA SPEC QL NAA+PROBE: NOT DETECTED
SOURCE AMPLIFICATION: NORMAL
SOURCE TP AMPLIFICATION: NORMAL
T VAGINALIS RRNA SPEC QL NAA+PROBE: NOT DETECTED

## 2021-08-16 ENCOUNTER — APPOINTMENT (OUTPATIENT)
Dept: OBGYN | Facility: CLINIC | Age: 31
End: 2021-08-16

## 2021-08-17 LAB — CYTOLOGY CVX/VAG DOC THIN PREP: ABNORMAL

## 2021-09-07 ENCOUNTER — RX RENEWAL (OUTPATIENT)
Age: 31
End: 2021-09-07

## 2021-11-09 ENCOUNTER — RX RENEWAL (OUTPATIENT)
Age: 31
End: 2021-11-09

## 2021-12-31 ENCOUNTER — RX RENEWAL (OUTPATIENT)
Age: 31
End: 2021-12-31

## 2022-02-11 NOTE — OB PROVIDER TRIAGE NOTE - NS_PELVIS_OBGYN_ALL_OB
Transfusion Medicine  Apheresis Procedure Note     Ricky Pak [id number]   YOB: 1997 Age: 24-year-old      Procedure: Red blood cell exchange           Assessment and Plan:      24-year-old male undergoes red cell exchange by apheresis. We plan to perform these exchanges once every 4 to 8 weeks. Peripheral veins were used for access for the procedure.  The patient tolerated today's exchange with no complaints.     He notes feeling well today.  Denies nausea, vomiting, fevers, chills. Due to allergy against diphenhydramine, Atarax was used as the premedication for the RBC exchange.             History of Present Illness:      Ricky is a 24-year-old male who presents for red blood cell exchange. 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 He began apheresis exchanges around 2013 following his issues with iron overload.  Per previous documentation, these exchanges have done an excellent job preventing sickle cell crises. He had a hypotensive episode following his last apheresis procedure here which was a depletion followed by an exchange. Therefore, at this time we plan to proceed with an exchange alone. He is currently well. He works in a traveling job for medical equipment sterilization, so he comes here for his RBC exchanges when he is in the geographic area.             Past Medical History:      Past Medical History has been reviewed in Epic.        Past Surgical History:      Surgical history was reviewed in Epic           Allergies:           Allergies   Allergen Reactions     Diphenhydramine Hives   .          Medications:          Current Outpatient Medications   Medication     acetaminophen-codeine (TYLENOL W/CODEINE #3) 300-30 MG per tablet     HYDROcodone-acetaminophen (NORCO) 5-325 MG tablet      No current  facility-administered medications for this encounter.                 Review of Systems:      See above          Exam:      /63   Pulse 94   Temp 97.9  F (36.6  C) (Oral)   Resp 16   Wt 106.8 kg (235 lb 7.2 oz)   BMI 32.24 kg/m         Alert, no apparent distress  Breathing appears comfortable on room air  Peripheral IV access for the procedure.          Data:      ROUTINE IP LABS (Last four results)  BMPRecent Labs   Lab 02/11/22  1257      POTASSIUM 3.8   CHLORIDE 107   BALBINA 8.8   CO2 25   BUN 12   CR 0.79   GLC 81     CBC  Recent Labs   Lab 02/11/22  1458 02/11/22  1257 02/09/22  0704   WBC  --   --  8.1   RBC  --   --  4.08*   HGB 10.0* 10.5* 11.3*   HCT 30.0* 31.0* 34.4*   MCV  --   --  84   MCH  --   --  27.7   MCHC  --   --  32.8   RDW  --   --  18.2*   PLT  --   --  547*     INRNo lab results found in last 7 days.                    Procedure Summary:   A red blood cell exchange by apheresis was performed and donor red blood cells were used as the replacement fluid.  Peripheral veins were used for access and allowed for appropriate flow during the procedure.  ACD-A was used for anticoagulation.  The patient's vital signs were stable throughout.  The patient tolerated the procedure well without complication.  See apheresis flowsheet for additional details.    ATTESTATION STATEMENT:   During the procedure this patient was directly seen and evaluated by me , Sofi Ochoa MD, PhD.      oSfi Ochoa MD, PhD  Transfusion Medicine Attending  Medical Director, Blood Bank Laboratory  Pager [phone]        Adequate

## 2022-03-07 NOTE — ED PROVIDER NOTE - ATTENDING CONTRIBUTION TO CARE
I, Mikael Rosa MD, personally saw the patient with ACP.  I have personally performed a face to face diagnostic evaluation on this patient.  I have reviewed the ACP note and agree with the history, exam, and plan of care, except as noted.    pt w/ RLQ pain and nausea, possible related to pregnancy however cannot exclude appendicitis.  Will check labs/UA/US, will likely require CDU for MRI. Fluoxetine 10mg daily (self discontinued)

## 2022-11-10 ENCOUNTER — EMERGENCY (EMERGENCY)
Facility: HOSPITAL | Age: 32
LOS: 1 days | Discharge: HOME | End: 2022-11-10
Attending: EMERGENCY MEDICINE
Payer: MEDICAID

## 2022-11-10 VITALS
TEMPERATURE: 99 F | DIASTOLIC BLOOD PRESSURE: 70 MMHG | OXYGEN SATURATION: 97 % | RESPIRATION RATE: 18 BRPM | HEART RATE: 81 BPM | SYSTOLIC BLOOD PRESSURE: 101 MMHG

## 2022-11-10 VITALS
TEMPERATURE: 100 F | WEIGHT: 293 LBS | HEIGHT: 70 IN | OXYGEN SATURATION: 97 % | SYSTOLIC BLOOD PRESSURE: 133 MMHG | HEART RATE: 97 BPM | DIASTOLIC BLOOD PRESSURE: 84 MMHG | RESPIRATION RATE: 20 BRPM

## 2022-11-10 DIAGNOSIS — K08.409 PARTIAL LOSS OF TEETH, UNSPECIFIED CAUSE, UNSPECIFIED CLASS: Chronic | ICD-10-CM

## 2022-11-10 PROCEDURE — 73630 X-RAY EXAM OF FOOT: CPT

## 2022-11-10 PROCEDURE — 73630 X-RAY EXAM OF FOOT: CPT | Mod: 26,RT

## 2022-11-10 PROCEDURE — 99284 EMERGENCY DEPT VISIT MOD MDM: CPT | Mod: 25

## 2022-11-10 PROCEDURE — 99283 EMERGENCY DEPT VISIT LOW MDM: CPT

## 2022-11-10 PROCEDURE — 73610 X-RAY EXAM OF ANKLE: CPT | Mod: 26,RT

## 2022-11-10 PROCEDURE — 73610 X-RAY EXAM OF ANKLE: CPT

## 2022-11-10 RX ORDER — IBUPROFEN 200 MG
600 TABLET ORAL ONCE
Refills: 0 | Status: COMPLETED | OUTPATIENT
Start: 2022-11-10 | End: 2022-11-10

## 2022-11-10 RX ORDER — ACETAMINOPHEN 500 MG
975 TABLET ORAL ONCE
Refills: 0 | Status: COMPLETED | OUTPATIENT
Start: 2022-11-10 | End: 2022-11-10

## 2022-11-10 RX ADMIN — Medication 975 MILLIGRAM(S): at 12:29

## 2022-11-10 RX ADMIN — Medication 600 MILLIGRAM(S): at 12:29

## 2022-11-10 NOTE — ED PROVIDER NOTE - MDM ORDERS SUBMITTED SELECTION
gastroenteritis     Aortic stenosis     Arthritis     Bain's esophagus     Benign prostatic hypertrophy 2015    Bilateral hearing loss     BPH (benign prostatic hyperplasia)     CAD (coronary artery disease)     aortic stenosis     Diverticulosis     H/O prostate cancer 2012    Radiation    Hyperlipidemia     Hyperlipidemia with poor tolerance to most of the statins     Hypertension     Hypertension, essential     Moderate to severe aortic stenosis     Tinnitus of left ear      Objective:   BP (!) 164/94 (Site: Left Arm, Position: Sitting, Cuff Size: Small Adult) Comment: says white coat syndrome  Pulse 82   Temp 98.4 °F (36.9 °C) (Tympanic)   Resp 15   Ht 5' 7\" (1.702 m)   Wt 176 lb 3.2 oz (79.9 kg)   SpO2 96%   BMI 27.60 kg/m²     Physical Exam  Heent: T.Ms normal Nares patent but mucosa swollen Mild pharyngeal injection. No                Exudate. No lip or tongue lesions. Neck: Minimal anter cervical adenopathy. No masses or thyroid asymmetry  Lungs:faint end expiratory wheeze. No rales. Breath sounds equal    Heart: Rate reg  2/6 syst murmur along rub  Gen;:  No acute distress  Assessment:         1.  Acute bronchitis, unspecified organism  XR CHEST STANDARD (2 VW)   2. Wheezing-associated respiratory infection  XR CHEST STANDARD (2 VW)   3. Elevated blood pressure reading        Plan:      Orders Placed This Encounter   Procedures    XR CHEST STANDARD (2 VW)     Standing Status:   Future     Standing Expiration Date:   2018     Orders Placed This Encounter   Medications    moxifloxacin (AVELOX) 400 MG tablet     Sig: Take 1 tablet by mouth daily for 7 days     Dispense:  7 tablet     Refill:  0    predniSONE (DELTASONE) 10 MG tablet     Simg daily x 4d, 30mg daily x 3d, 20mg x 2d, 10mg x 1d, then d/c     Dispense:  30 tablet     Refill:  0    albuterol sulfate HFA (PROAIR HFA) 108 (90 Base) MCG/ACT inhaler     Sig: Inhale 2 puffs into the lungs every 6 hours as needed for Wheezing     Dispense:  1 Inhaler     Refill:  0   f/u dependent on xrys   Should get bp checked thru nurse once over current illness Imaging Studies

## 2022-11-10 NOTE — ED PROVIDER NOTE - OBJECTIVE STATEMENT
31 yo female in blue 35L presents to the ER for evaluation of right ankle injury. Pt awake alert oriented x3 states "I was walking outside and the pavement was not even and I twisted my ankle and fell. I was nervous to try and stand up and walk and hopped to the car with the help of my .  I have not put any weight on my right foot since I fell".  PT with moderate pain to right lateral ankle and foot and abrasion to left knee with no tenderness.  Pt denies hitting head. Denies neck and back pain.

## 2022-11-10 NOTE — ED PROVIDER NOTE - NS ED ATTENDING STATEMENT MOD
This was a shared visit with the ELISABET. I reviewed and verified the documentation and independently performed the documented:

## 2022-11-10 NOTE — ED PROVIDER NOTE - CONSTITUTIONAL NEGATIVE STATEMENT, MLM
[de-identified] : General: No acute distress, conversant, well-nourished.\par Head: Normocephalic, atraumatic\par Neck: trachea midline, FROM\par Heart: normotensive and normal rate and rhythm\par Lungs: No labored breathing\par Skin: No abrasions, no rashes, no edema\par Psych: Alert and oriented to person, place and time\par Extremities: no peripheral edema or digital cyanosis\par Gait: Normal gait. Can perform tandem gait.  \par Vascular: warm and well perfused distally, palpable distal pulses\par \par MSK:\par Lumbar spine:\par No tenderness to palpation.  No step-off, no deformity.\par \par NEURO EXAM:\par Sensation \par Left L2  -  2/2            \par Left L3  -  2/2\par Left L4  -  2/2\par Left L5  -  2/2\par Left S1  -  2/2\par \par Right L2  -  2/2            \par Right L3  -  2/2\par Right L4  -  2/2\par Right L5  -  2/2\par Right S1  -  2/2\par \par Motor: \par Left L2 (hip flexion)                            5/5                \par Left L3 (knee extension)                   5/5                \par Left L4 (ankle dorsiflexion)                 5/5                \par Left L5 (long toe extensor)                5/5                \par Left S1 (ankle plantar flexion)           5/5\par \par Right L2 (hip flexion)                            5/5                \par Right L3 (knee extension)                   5/5                \par Right L4 (ankle dorsiflexion)                 5/5                \par Right L5 (long toe extensor)                5/5                \par Right S1 (ankle plantar flexion)           5/5\par \par Reflexes: Normal and symmetric\par Negative clonus.  Down-going Babinski.   [de-identified] : I ordered radiographs to evaluate the patient's symptoms.\par \par Lumbar 4 view radiographs taken in the office today show no dislocation or fracture.  Lumbar spondylosis. L5-S1 decreased disc height. L5-S1 spondylolisthesis with movement on dynamic series.   no fever and no chills.

## 2022-11-10 NOTE — ED PROVIDER NOTE - CLINICAL SUMMARY MEDICAL DECISION MAKING FREE TEXT BOX
s/p mechanical trip and fall-  pain control, xray, splint s/p mechanical trip and fall-  pain control, xray, splint    FRANCINE Solano MD: Agree with resident/ACP MDM, assessment and plan as above. Pt NVI, no deformity. Suspect sprain. Will treat pain, reassess

## 2022-11-10 NOTE — ED PROVIDER NOTE - PATIENT PORTAL LINK FT
You can access the FollowMyHealth Patient Portal offered by [facility] by registering at the following website: [email or web address]. By joining [employer]’s FollowMyHealth portal, you will also be able to view your health information using other applications (apps) compatible with our system.

## 2022-11-10 NOTE — ED PROVIDER NOTE - NSFOLLOWUPINSTRUCTIONS_ED_ALL_ED_FT
Thank you for visiting our Emergency Department, it has been a pleasure taking part in your healthcare. Please follow up with your primary doctor within x48 hours.    Your discharge diagnosis is:    Return precautions to the Emergency Department include but are not limited to: unrelenting nausea, vomiting, fever, chills, chest pain, shortness of breath, dizziness, abdominal pain, worsening pain, syncope, blood in urine or stool, headache that doesn't resolve, numbness or tingling, loss of sensation, loss of motor function, or any other concerning symptoms. Thank you for visiting our Emergency Department, it has been a pleasure taking part in your healthcare. Please follow up with your primary doctor within x48 hours.    Your discharge diagnosis is: Right ankle Sprain    Return precautions to the Emergency Department include but are not limited to: unrelenting nausea, vomiting, fever, chills, chest pain, shortness of breath, dizziness, abdominal pain, worsening pain, syncope, blood in urine or stool, headache that doesn't resolve, numbness or tingling, loss of sensation, loss of motor function, or any other concerning symptoms.    -- Please use 650mg Tylenol (also called acetaminophen) every 4 hours & 600mg Motrin (also called Advil or ibuprofen) every 6 hours as needed for pain/discomfort/swelling. You can get these without a prescription. Don't use more than 3500mg of Tylenol in any 24-hour period. Make sure your other prescription/over-the-counter medications don't contain any Tylenol so you don't take too much. If you have any stomach discomfort while taking Motrin, you can use TUMS or Pepcid or Zantac (these can all be bought without a prescription).     Rest, apply ice over covered skin for no more than 15 minutes at a time, keep affected extremity elevated, use compressive dressing or splint as provided and instructed.      Wear ace wrap and air cast for comfort- Use crutches to help you ambulate     Follow up with Dr. Parker - Orthopedist in the next week if pain persists- [phone]

## 2022-11-10 NOTE — ED PROVIDER NOTE - IV ALTEPLASE ADMIN OUTSIDE HIDDEN
show Propranolol Counseling:  I discussed with the patient the risks of propranolol including but not limited to low heart rate, low blood pressure, low blood sugar, restlessness and increased cold sensitivity. They should call the office if they experience any of these side effects.

## 2022-11-10 NOTE — ED ADULT NURSE NOTE - OBJECTIVE STATEMENT
pt 33 yo female presents to [facility] s/p trip and fall this am with right ankle pain tenderness and left knee abrasion no bleeding pt denies any head injury skin warm dry motor and sensory intact to extremities

## 2023-01-25 NOTE — ED ADULT NURSE NOTE - CCCP TRG CHIEF CMPLNT
Recommend non-pharmacological interventions to prevent/treat delirium  - maintain day/night light cycles  - optimize sleep-wake cycle, minimize environmental noise  - reorientation frequently  - use verbal redirection as first line  - minimize restraints and lines  - ensure good bladder/bowel function  - ensure adequate pain control
ankle pain/injury

## 2023-02-04 NOTE — OB PROVIDER TRIAGE NOTE - NS_DCGESTAGE_OBGYN_ALL_OB_FT
[de-identified] : Pt complaints of runny nose for over a month without improvement mother states no fever. 1 MONTH HX OF COUGH, WORSENING NASAL CONGESTION, NO FEVER, 3 WEEK HX OF STOMACH PAIN, NO VOMITING OR DIARRHEA
37.3

## 2023-03-30 ENCOUNTER — NON-APPOINTMENT (OUTPATIENT)
Age: 33
End: 2023-03-30

## 2023-04-04 ENCOUNTER — APPOINTMENT (OUTPATIENT)
Dept: OBGYN | Facility: CLINIC | Age: 33
End: 2023-04-04

## 2023-04-20 NOTE — OB PST NOTE - NSWEIGHTCALCTOOLDRUG_GEN_A_CORE
History and Physical    Chief complaint:  Painless, progressive decrease in vision which interferes with activities of daily living left eye.    History:    Patient Active Problem List   Diagnosis   • Pain in joint, ankle and foot   • Chronic insomnia   • Vaginal atrophy   • Mixed hyperlipidemia   • Anxiety   • Sciatica of left side   • Benign essential HTN   • Medication intolerance   • Elevated CPK   • Urinary urgency   • Muscular dystrophy (CMD)   • Combined forms of age-related cataract, bilateral   • Meibomian gland dysfunction (MGD) of upper and lower lids of both eyes   • PVD (posterior vitreous detachment), both eyes         Allergies:      ALLERGIES:   Allergen Reactions   • Codeine VOMITING   • Hydrocodone VOMITING   • Prozac Palpitations   • Skelaxin VOMITING       Medications:    Current Facility-Administered Medications   Medication Dose Route Frequency Provider Last Rate Last Admin   • sodium chloride 0.9 % flush bag 25 mL  25 mL Intravenous PRN Jet Reyes MD       • sodium chloride (PF) 0.9 % injection 2 mL  2 mL Intracatheter 2 times per day Jet Reyes MD       • lactated ringers infusion   Intravenous Continuous Jet Reyes MD       • tetracaine (PONTOCAINE) 0.5 % ophthalmic solution 1 drop  1 drop Left Eye Q15 Min PRN Jet Reyes MD   1 drop at 04/20/23 0630   • lidocaine HCl (PF) (XYLOCAINE) 1 % injection 5 mg  5 mg Subcutaneous PRN Joshua Cardoza CRNA       • metoPROLOL tartrate (LOPRESSOR) tablet 25 mg  25 mg Oral Once PRN Joshua Cardoza CRNA       • dextrose 50 % injection 25 g  25 g Intravenous PRN Joshua Cardoza CRNA       • insulin regular (human) (HumuLIN R, NovoLIN R) sliding scale injection   Subcutaneous Once PRN Joshua Cardoza CRNA       • sodium chloride 0.9 % flush bag 25 mL  25 mL Intravenous PRN Joshua Cardoza CRNA       • sodium chloride (PF) 0.9 % injection 2 mL  2 mL Intracatheter 2 times per day Joshua Cardoza CRNA       •  lactated ringers infusion   Intravenous Continuous SupriyaeimerJoshua L, CRNA 10 mL/hr at 04/20/23 0634 New Bag at 04/20/23 0634   • sodium chloride 0.9% infusion   Intravenous Continuous Supriyaeimer, Joshua L, CRNA       • dextrose 5 % infusion   Intravenous Continuous PRN AnumerJoshua L, CRNA            Physical Review of Systems:   General:  Well developed, well nourished, alert and oriented x3, NAD   Vital Signs:    Visit Vitals  /78   Pulse 93   Temp 97.4 °F (36.3 °C) (Temporal)   Resp 18   Ht 4' 10\" (1.473 m)   Wt 53.3 kg (117 lb 8.1 oz)   SpO2 97%   BMI 24.56 kg/m²       Heart:  regular rhythm and rate within normal limits   Lungs:  Clear to auscultation,    Abdomen:  benign   Extremities:  without  edema    Impression:  Visually significant cataract left eye.    Planned Course of Action:  Cataract removal with intraocular lens implant    *Risks, benefits, and alternatives discussed with the patient.  Appropriate to proceed with surgical procedure.      used

## 2023-04-24 ENCOUNTER — APPOINTMENT (OUTPATIENT)
Dept: OBGYN | Facility: CLINIC | Age: 33
End: 2023-04-24
Payer: COMMERCIAL

## 2023-04-24 PROCEDURE — 99213 OFFICE O/P EST LOW 20 MIN: CPT

## 2023-04-25 NOTE — PLAN
[FreeTextEntry1] : Discussed LEEP and risks/options.\par Discussed bleeding infection and cancer risk.\par Had a very complex colposcopy and desires leep done with anesthesia.\par Questions answered.\par Discussed strange tohave brynn 1/2/3 and hpv neg.

## 2023-04-25 NOTE — HISTORY OF PRESENT ILLNESS
[FreeTextEntry1] : In to review pap hpv neg with colpo with cin2/3 in canal.  CHERYL 1 ectocervix.  TVUS Normal.

## 2023-05-08 ENCOUNTER — NON-APPOINTMENT (OUTPATIENT)
Age: 33
End: 2023-05-08

## 2023-05-10 NOTE — ED ADULT NURSE NOTE - NS ED NOTE ABUSE SUSPICION NEGLECT YN
Airway    Performed by: Susan Huerta CRNA  Authorized by: Maia Hoover MD    Final Airway Type:  Supraglottic airway  Final Supraglottic Airway:  IGel  SGA Size*:  4  Attempts*:  1  Number of Other Approaches Attempted:  0   Patient Identified, Procedure confirmed, Emergency equipment available and Safety protocols followed  Location:  OR  Urgency:  Elective  Difficult Airway: No    Indications for Airway Management:  Anesthesia  Spontaneous Ventilation: absent    Sedation Level:  Anesthetized  Manual In-line Stabilization Maintained Throughout: Yes    Mask Difficulty Assessment:  0 - not attempted  Start Time: 5/10/2023 2:32 PM      
No

## 2023-05-23 ENCOUNTER — APPOINTMENT (OUTPATIENT)
Dept: OBGYN | Facility: CLINIC | Age: 33
End: 2023-05-23
Payer: COMMERCIAL

## 2023-05-23 VITALS
SYSTOLIC BLOOD PRESSURE: 124 MMHG | HEIGHT: 70 IN | BODY MASS INDEX: 41.95 KG/M2 | WEIGHT: 293 LBS | DIASTOLIC BLOOD PRESSURE: 80 MMHG

## 2023-05-23 DIAGNOSIS — R10.2 PELVIC AND PERINEAL PAIN: ICD-10-CM

## 2023-05-23 DIAGNOSIS — R10.9 UNSPECIFIED ABDOMINAL PAIN: ICD-10-CM

## 2023-05-23 PROCEDURE — 81003 URINALYSIS AUTO W/O SCOPE: CPT | Mod: QW

## 2023-05-23 PROCEDURE — 99213 OFFICE O/P EST LOW 20 MIN: CPT | Mod: 25

## 2023-05-24 LAB
ALBUMIN SERPL ELPH-MCNC: 4.5 G/DL
ALP BLD-CCNC: 72 U/L
ALT SERPL-CCNC: 26 U/L
ANION GAP SERPL CALC-SCNC: 13 MMOL/L
AST SERPL-CCNC: 16 U/L
BILIRUB SERPL-MCNC: 0.3 MG/DL
BUN SERPL-MCNC: 13 MG/DL
CALCIUM SERPL-MCNC: 9.8 MG/DL
CHLORIDE SERPL-SCNC: 103 MMOL/L
CO2 SERPL-SCNC: 24 MMOL/L
CREAT SERPL-MCNC: 0.49 MG/DL
EGFR: 127 ML/MIN/1.73M2
GLUCOSE SERPL-MCNC: 104 MG/DL
HCG SERPL-MCNC: <1 MIU/ML
POTASSIUM SERPL-SCNC: 4.4 MMOL/L
PROT SERPL-MCNC: 7.1 G/DL
SODIUM SERPL-SCNC: 140 MMOL/L

## 2023-05-25 RX ORDER — DULOXETINE HYDROCHLORIDE 60 MG/1
60 CAPSULE, DELAYED RELEASE PELLETS ORAL
Qty: 60 | Refills: 0 | Status: DISCONTINUED | COMMUNITY
Start: 2021-12-31 | End: 2023-05-25

## 2023-05-25 RX ORDER — DULOXETINE HYDROCHLORIDE 30 MG/1
30 CAPSULE, DELAYED RELEASE PELLETS ORAL DAILY
Qty: 270 | Refills: 1 | Status: DISCONTINUED | COMMUNITY
Start: 2020-08-27 | End: 2023-05-25

## 2023-05-25 NOTE — HISTORY OF PRESENT ILLNESS
[FreeTextEntry1] : Very anxious and tearful.  C/o chronic abdominal/pelvic pain.  Intermittent.  Has had recent tvus.

## 2023-05-25 NOTE — PLAN
[FreeTextEntry1] : Discussed unlikely gyn or gu at umbilicus.\par Recommend gi referral.\par Discussed CT abd and pelvis with contrast.\par Discussed anxiety.\par FU er with worsening or persistent pain.

## 2023-05-25 NOTE — PHYSICAL EXAM
[Soft] : soft [Non-tender] : non-tender [Non-distended] : non-distended [FreeTextEntry7] : indicate pain if just below umbilicus [Labia Majora] : normal [Labia Minora] : normal [Normal] : normal [Uterine Adnexae] : normal

## 2023-05-25 NOTE — REVIEW OF SYSTEMS
[Fatigue] : fatigue [Sore Throat] : sore throat [Abdominal Pain] : abdominal pain [Diarrhea] : diarrhea [Heartburn] : heartburn [Dysuria] : dysuria [Pelvic pain] : pelvic pain [Hot Flashes] : hot flashes [Negative] : Heme/Lymph

## 2023-05-31 ENCOUNTER — OUTPATIENT (OUTPATIENT)
Dept: OUTPATIENT SERVICES | Facility: HOSPITAL | Age: 33
LOS: 1 days | End: 2023-05-31

## 2023-05-31 VITALS
HEIGHT: 68 IN | SYSTOLIC BLOOD PRESSURE: 124 MMHG | WEIGHT: 293 LBS | RESPIRATION RATE: 16 BRPM | OXYGEN SATURATION: 99 % | DIASTOLIC BLOOD PRESSURE: 85 MMHG | HEART RATE: 85 BPM | TEMPERATURE: 97 F

## 2023-05-31 DIAGNOSIS — K08.409 PARTIAL LOSS OF TEETH, UNSPECIFIED CAUSE, UNSPECIFIED CLASS: Chronic | ICD-10-CM

## 2023-05-31 DIAGNOSIS — Z98.891 HISTORY OF UTERINE SCAR FROM PREVIOUS SURGERY: Chronic | ICD-10-CM

## 2023-05-31 DIAGNOSIS — D06.9 CARCINOMA IN SITU OF CERVIX, UNSPECIFIED: ICD-10-CM

## 2023-05-31 DIAGNOSIS — E66.01 MORBID (SEVERE) OBESITY DUE TO EXCESS CALORIES: ICD-10-CM

## 2023-05-31 LAB
HCG UR QL: NEGATIVE — SIGNIFICANT CHANGE UP
HCT VFR BLD CALC: 41.2 % — SIGNIFICANT CHANGE UP (ref 34.5–45)
HGB BLD-MCNC: 13.1 G/DL — SIGNIFICANT CHANGE UP (ref 11.5–15.5)
MCHC RBC-ENTMCNC: 27.5 PG — SIGNIFICANT CHANGE UP (ref 27–34)
MCHC RBC-ENTMCNC: 31.8 GM/DL — LOW (ref 32–36)
MCV RBC AUTO: 86.6 FL — SIGNIFICANT CHANGE UP (ref 80–100)
NRBC # BLD: 0 /100 WBCS — SIGNIFICANT CHANGE UP (ref 0–0)
NRBC # FLD: 0 K/UL — SIGNIFICANT CHANGE UP (ref 0–0)
PLATELET # BLD AUTO: 244 K/UL — SIGNIFICANT CHANGE UP (ref 150–400)
RBC # BLD: 4.76 M/UL — SIGNIFICANT CHANGE UP (ref 3.8–5.2)
RBC # FLD: 13.5 % — SIGNIFICANT CHANGE UP (ref 10.3–14.5)
WBC # BLD: 6.98 K/UL — SIGNIFICANT CHANGE UP (ref 3.8–10.5)
WBC # FLD AUTO: 6.98 K/UL — SIGNIFICANT CHANGE UP (ref 3.8–10.5)

## 2023-05-31 RX ORDER — SODIUM CHLORIDE 9 MG/ML
1000 INJECTION, SOLUTION INTRAVENOUS
Refills: 0 | Status: DISCONTINUED | OUTPATIENT
Start: 2023-06-08 | End: 2023-06-22

## 2023-05-31 NOTE — H&P PST ADULT - ATTENDING COMMENTS
CHERYL 3 for LEEP  Reviewed options and risks.  Bleeding infection damage to organs.  Questions answered.  Consent signed.

## 2023-05-31 NOTE — H&P PST ADULT - HISTORY OF PRESENT ILLNESS
32 yo female presents to PST unit with pre-op diagnosis of carcinoma in situ of cervix scheduled for LEEP with Dr. De La Cruz.

## 2023-05-31 NOTE — H&P PST ADULT - PROBLEM SELECTOR PLAN 1
Scheduled for LEEP with Dr. De La Cruz on 06/08/2023.  Verbal and written pre-op instructions provided to patient. Patient verbalized understanding and will call surgeons office for revised instructions if surgery is rescheduled.   Pepcid for GI prophylaxis provided.   Urine pregnancy test DOS-Urine cup provided.

## 2023-05-31 NOTE — H&P PST ADULT - ASSESSMENT
34 yo female with pre-op diagnosis of carcinoma in situ of cervix scheduled for LEEP with Dr. De La Cruz.

## 2023-06-05 ENCOUNTER — NON-APPOINTMENT (OUTPATIENT)
Age: 33
End: 2023-06-05

## 2023-06-07 ENCOUNTER — TRANSCRIPTION ENCOUNTER (OUTPATIENT)
Age: 33
End: 2023-06-07

## 2023-06-08 ENCOUNTER — RESULT REVIEW (OUTPATIENT)
Age: 33
End: 2023-06-08

## 2023-06-08 ENCOUNTER — APPOINTMENT (OUTPATIENT)
Dept: OBGYN | Facility: CLINIC | Age: 33
End: 2023-06-08

## 2023-06-08 ENCOUNTER — TRANSCRIPTION ENCOUNTER (OUTPATIENT)
Age: 33
End: 2023-06-08

## 2023-06-08 ENCOUNTER — OUTPATIENT (OUTPATIENT)
Dept: OUTPATIENT SERVICES | Facility: HOSPITAL | Age: 33
LOS: 1 days | Discharge: HOME | End: 2023-06-08
Payer: COMMERCIAL

## 2023-06-08 VITALS
OXYGEN SATURATION: 98 % | HEART RATE: 84 BPM | RESPIRATION RATE: 15 BRPM | SYSTOLIC BLOOD PRESSURE: 110 MMHG | DIASTOLIC BLOOD PRESSURE: 70 MMHG

## 2023-06-08 VITALS
RESPIRATION RATE: 16 BRPM | WEIGHT: 293 LBS | DIASTOLIC BLOOD PRESSURE: 88 MMHG | OXYGEN SATURATION: 99 % | HEART RATE: 92 BPM | SYSTOLIC BLOOD PRESSURE: 146 MMHG | TEMPERATURE: 99 F | HEIGHT: 68 IN

## 2023-06-08 DIAGNOSIS — K08.409 PARTIAL LOSS OF TEETH, UNSPECIFIED CAUSE, UNSPECIFIED CLASS: Chronic | ICD-10-CM

## 2023-06-08 DIAGNOSIS — Z98.891 HISTORY OF UTERINE SCAR FROM PREVIOUS SURGERY: Chronic | ICD-10-CM

## 2023-06-08 DIAGNOSIS — D06.9 CARCINOMA IN SITU OF CERVIX, UNSPECIFIED: ICD-10-CM

## 2023-06-08 LAB — HCG UR QL: NEGATIVE — SIGNIFICANT CHANGE UP

## 2023-06-08 PROCEDURE — 57461 CONZ OF CERVIX W/SCOPE LEEP: CPT

## 2023-06-08 PROCEDURE — 88307 TISSUE EXAM BY PATHOLOGIST: CPT | Mod: 26

## 2023-06-08 RX ORDER — OXYCODONE HYDROCHLORIDE 5 MG/1
5 TABLET ORAL ONCE
Refills: 0 | Status: DISCONTINUED | OUTPATIENT
Start: 2023-06-08 | End: 2023-06-08

## 2023-06-08 RX ORDER — ONDANSETRON 8 MG/1
4 TABLET, FILM COATED ORAL ONCE
Refills: 0 | Status: DISCONTINUED | OUTPATIENT
Start: 2023-06-08 | End: 2023-06-22

## 2023-06-08 RX ORDER — SODIUM CHLORIDE 9 MG/ML
1000 INJECTION, SOLUTION INTRAVENOUS
Refills: 0 | Status: DISCONTINUED | OUTPATIENT
Start: 2023-06-08 | End: 2023-06-22

## 2023-06-08 RX ORDER — FENTANYL CITRATE 50 UG/ML
50 INJECTION INTRAVENOUS
Refills: 0 | Status: DISCONTINUED | OUTPATIENT
Start: 2023-06-08 | End: 2023-06-08

## 2023-06-08 RX ADMIN — SODIUM CHLORIDE 125 MILLILITER(S): 9 INJECTION, SOLUTION INTRAVENOUS at 11:46

## 2023-06-08 NOTE — PACU DISCHARGE NOTE - NS MD DISCHARGE NOTE DISCHARGE
[facility]  Vaginal Delivery Note   Review the Delivery Report for details.       Delivery     Delivery: Vaginal, Spontaneous     YOB: 2023    Time of Birth:  Gestational Age 1:24 PM   40w0d     Anesthesia: Epidural     Delivering clinician: Lori Ambriz    Forceps?   No   Vacuum? No    Shoulder dystocia present: No        Delivery narrative:  Patient pushed to deliver a viable 3530 g female from the cephalic position over an intact perineum via  under epidural anesthesia on 2/15/2022 at 1324.  No noted nuchal cord.  Left anterior shoulder was delivered with ease, followed by right posterior shoulder.  Body was then delivered with gentle traction.  Mouth and nose bulb suctioned.  3 vessel cord clamped x2 and cut after 30 seconds of delayed clamping.  Baby was placed on mother's chest.  Cord blood was obtained and sent.  Placenta delivered spontaneously intact and Pitocin was started.  Cervix, vagina, and perineum were examined and a 2nd  degree laceration and bilateral periurethral lacerations were noted.  3-0 Vicryl was used to repair the laceration in a routine fashion with good hemostasis.   mL; QBL pending, please see nursing documentation.  Apgar scores 8/9.  Mother and infant stable.      Infant    Findings: female  infant     Infant observations: Weight: 3530 g (7 lb 12.5 oz)   Length: 21.26  in  Observations/Comments:        Apgars: 8  @ 1 minute /    9  @ 5 minutes   Infant Name: Christa     Placenta, Cord, and Fluid    Placenta delivered  Spontaneous  at   2022  1:35 PM     Cord: 3 vessels  present.   Nuchal Cord?  no   Cord blood obtained: Yes    Cord gases obtained:  No    Cord gas results: Venous:  No results found for: PHCVEN    Arterial:  No results found for: PHCART     Repair    Episiotomy: None     No    Lacerations: Yes  Laceration Information  Laceration Repaired?   Perineal: 2nd  Yes    Periurethral: bilateral  Yes    Labial:       Sulcus:       Vaginal:        Cervical:         Suture used for repair: 3-0 Vicryl   Estimated Blood Loss:               Quantitative Blood Loss: Quantitative Blood Loss (mL): 332 mL (02/14/22 1616)                Complications  none    Disposition  Mother to Mother Baby/Postpartum  in stable condition currently.  Baby to remains with mom  in stable condition currently.      Lori Ambriz DO  02/15/22  08:21 CST       Home

## 2023-06-08 NOTE — BRIEF OPERATIVE NOTE - NSICDXBRIEFPREOP_GEN_ALL_CORE_FT
PRE-OP DIAGNOSIS:  CHERYL I (cervical intraepithelial neoplasia I) 08-Jun-2023 10:40:43  Dorita Austin  CHERYL II (cervical intraepithelial neoplasia II) 08-Jun-2023 10:40:30  Dorita Austin

## 2023-06-08 NOTE — ASU DISCHARGE PLAN (ADULT/PEDIATRIC) - NURSING INSTRUCTIONS
You received IV Tylenol for pain management at 0945am. Please DO NOT take any Tylenol (Acetaminophen) containing products, such as Vicodin, Percocet, Excedrin, and cold medications for the next 6 hours (until 345pm PM). DO NOT TAKE MORE THAN 3000 MG OF TYLENOL in a 24 hour period.

## 2023-06-08 NOTE — ASU DISCHARGE PLAN (ADULT/PEDIATRIC) - CARE PROVIDER_API CALL
Mikael De La Cruz  Obstetrics and Gynecology  [address], [address]  [place] [address]  Phone: [phone]  Fax: [phone]  Follow Up Time:

## 2023-06-08 NOTE — ASU DISCHARGE PLAN (ADULT/PEDIATRIC) - PROVIDER TOKENS
PROVIDER:[TOKEN:[[id number]]] Samples Given: Retin-A micro 0.08% apply pea-sized amount to face. Detail Level: Zone

## 2023-06-08 NOTE — BRIEF OPERATIVE NOTE - OPERATION/FINDINGS
Cervix stained with Lugol's solution. LEEP with large Tam cone. LEEP bed with brisk bleeding uncontrolled by pressure, 2-0 vicryl interrupted sutures placed at 5 o'clock and 7 o'clock, LEEP bed cauterized with roller ball. Monsel's applied over surgical area. Good hemostasis noted.

## 2023-06-08 NOTE — ASU DISCHARGE PLAN (ADULT/PEDIATRIC) - ASU DC SPECIAL INSTRUCTIONSFT
During the procedure a solution called Monsel's solution was applied to your cervix to help with bleeding. You may have brown/black discharge like coffee grounds, which is normal.    After your surgery it is normal to experience:  •	Vaginal bleeding- can last 1-2 weeks and should not be heavier than a period. It may come and go and be red, brown or pink. Use pads, not tampons.  •	Cramping- Is common especially within the first 24 hours. This should be relieved by taking over the counter motrin, advil or Tylenol.  •	Vaginal soreness/irritation- can occur in the first few days after surgery because of the instruments that were used in the vagina. Soreness can be treated with ice pack and irritation can be taken care of with an emollient such as balmex or aquaphor that you can put directly on the irritated area.    Restrictions: For 10-14 days after the surgery you should avoid the following:  •	Tampons  •	Sex  •	Vigorous gym exercise  •	Swimming  pools and tub baths  •	Wait a day or two before going back to work    Anesthesia Precautions:  For the next 24 hours do not:   •	drive a car,  •	 operate power tools or machinery,  •	drink alcohol, beer, or wine,   •	make important personal or business decisions    Diet:   •	Resume Regular diet but Progress diet slowly     Pain Medication:  •	Take over the counter pain medication  •	Tylenol 975 mg every 6 hours, and Motrin 800 mg every 6 hours, alternating (Tylenol, three hours later Motrin, three hours later Tylenol)    Physician Notification- Warning signs to look out for  •	Heavy Vaginal Bleeding   •	Shortness of breath or chest pain  •	Severe Abdominal Pain  •	Persistent nausea and vomiting  •	Pain not relieved by medications  •	Fever greater than 100.5®F  •	Inability to tolerate liquids or foods  •	Unable to urinate after 8 hours

## 2023-06-08 NOTE — ASU DISCHARGE PLAN (ADULT/PEDIATRIC) - NS MD DC FALL RISK RISK
For information on Fall & Injury Prevention, visit: [email or web address] OR  [email or web address] OR  https://www.cdc.gov/steadi/patient.html

## 2023-06-08 NOTE — BRIEF OPERATIVE NOTE - NSICDXBRIEFPOSTOP_GEN_ALL_CORE_FT
POST-OP DIAGNOSIS:  CHERYL II (cervical intraepithelial neoplasia II) 08-Jun-2023 10:40:52  Dorita Austin  CHERYL I (cervical intraepithelial neoplasia I) 08-Jun-2023 10:40:48  Dorita Austin   no fever and no chills.

## 2023-06-19 LAB — SURGICAL PATHOLOGY STUDY: SIGNIFICANT CHANGE UP

## 2023-06-20 ENCOUNTER — NON-APPOINTMENT (OUTPATIENT)
Age: 33
End: 2023-06-20

## 2023-06-21 PROBLEM — D06.9 CARCINOMA IN SITU OF CERVIX, UNSPECIFIED: Chronic | Status: ACTIVE | Noted: 2023-05-31

## 2023-06-27 NOTE — OB RN PREOPERATIVE CHECKLIST - NS_PREOPCONSULTS_OBGYN_ALL_OB
Medicare Wellness Visit  Plan for Preventive Care    A good way for you to stay healthy is to use preventive care.  Medicare covers many services that can help you stay healthy.* The goal of these services is to find any health problems as quickly as possible. Finding problems early can help make them easier to treat.  Your personal plan below lists the services you may need and when they are due.      Health Maintenance Summary     Diabetes A1C (Every 6 Months)  Ordered on 6/27/2023    Diabetes Eye Exam (Yearly)  Next due on 10/18/2023    Breast Cancer Screening (Every 2 Years)  Next due on 12/23/2023    Lung Cancer Screening (Yearly)  Scheduled for 4/1/2024    DM/CKD GFR (Yearly)  Next due on 6/9/2024    Diabetes Foot Exam (Yearly)  Next due on 6/27/2024    Traditional Medicare- Medicare Wellness Visit (Yearly)  Next due on 6/27/2024    Depression Screening (Yearly)  Next due on 6/27/2024    Colorectal Cancer Risk - Colonoscopy (Every 3 Years)  Next due on 10/18/2025    DTaP/Tdap/Td Vaccine (3 - Td or Tdap)  Next due on 5/28/2031    Hepatitis C Screening   Completed    Osteoporosis Screening   Completed    Pneumococcal Vaccine 65+   Completed    Shingles Vaccine   Completed    COVID-19 Vaccine   Completed    Influenza Vaccine   Completed    Meningococcal Vaccine   Aged Out    Hepatitis B Vaccine (For Physician/[facility] Discussion)   Aged Out    HPV Vaccine   Aged Out    Colorectal Cancer Screen-   Discontinued           Preventive Care for Women and Men    Heart Screenings (Cardiovascular):  · Blood tests are used to check your cholesterol, lipid and triglyceride levels. High levels can increase your risk for heart disease and stroke. High levels can be treated with medications, diet and exercise. Lowering your levels can help keep your heart and blood vessels healthy.  Your provider will order these tests if they are needed.    · An ultrasound is done to see if you have an abdominal aortic aneurysm (AAA).  This is  an enlargement of one of the main blood vessels that delivers blood to the body.   In the [place], 9,000 deaths are caused by AAA.  You may not even know you have this problem and as many as 1 in 3 people will have a serious problem if it is not treated.  Early diagnosis allows for more effective treatment and cure.  If you have a family history of AAA or are a male age 65-75 who has smoked, you are at higher risk of an AAA.  Your provider can order this test, if needed.    Colorectal Screening:  · There are many tests that are used to check for cancer of your colon and rectum. You and your provider should discuss what test is best for you and when to have it done.  Options include:  · Screening Colonoscopy: exam of the entire colon, seen through a flexible lighted tube.  · Flexible Sigmoidoscopy: exam of the last third (sigmoid portion) of the colon and rectum, seen through a flexible lighted tube.  · Cologuard DNA stool test: a sample of your stool is used to screen for cancer and unseen blood in your stool.  · Fecal Occult Blood Test: a sample of your stool is studied to find any unseen blood    Flu Shot:  · An immunization that helps to prevent influenza (the flu). You should get this every year. The best time to get the shot is in the fall.    Pneumococcal Shot:  • Vaccines help prevent pneumococcal disease, which is any type of illness caused by Streptococcus pneumoniae bacteria. There are two kinds of pneumococcal vaccines available in the [place]:   o Pneumococcal conjugate vaccines (PCV20 or Gspsyms53®)  o Pneumococcal polysaccharide vaccine (PPSV23 or Qwcreorkv20®)  · For those who have never received any pneumococcal conjugate vaccine, CDC recommends PVC20 for adults 65 years or older and adults 19 through 64 years old with certain medical conditions or risk factors.   · For those who have previously received PCV13, this should be followed by a dose of PPSV23.     Hepatitis B Shot:  · An  immunization that helps to protect people from getting Hepatitis B. Hepatitis B is a virus that spreads through contact with infected blood or body fluids. Many people with the virus do not have symptoms.  The virus can lead to serious problems, such as liver disease. Some people are at higher risk than others. Your doctor will tell you if you need this shot.     Diabetes Screening:  · A test to measure sugar (glucose) in your blood is called a fasting blood sugar. Fasting means you cannot have food or drink for at least 8 hours before the test. This test can detect diabetes long before you may notice symptoms.    Glaucoma Screening:  · Glaucoma screening is performed by your eye doctor. The test measures the fluid pressure inside your eyes to determine if you have glaucoma.     Hepatitis C Screening:  · A blood test to see if you have the hepatitis C virus.  Hepatitis C attacks the liver and is a major cause of chronic liver disease.  Medicare will cover a single screening for all adults born between 1945 & 1965, or high risk patients (people who have injected illegal drugs or people who have had blood transfusions).  High risk patients who continue to inject illegal drugs can be screened for Hepatitis C every year.    Smoking and Tobacco-Use Cessation Counseling:  · Tobacco is the single greatest cause of disease and early death in our country today. Medication and counseling together can increase a person’s chance of quitting for good.   · Medicare covers two quitting attempts per year, with four counseling sessions per attempt (eight sessions in a 12 month period)    Preventive Screening tests for Women    Screening Mammograms and Breast Exams:  · An x-ray of your breasts to check for breast cancer before you or your doctor may be able to feel it.  If breast cancer is found early it can usually be treated with success.    Pelvic Exams and Pap Tests:  · An exam to check for cervical and vaginal cancer. A Pap test  is a lab test in which cells are taken from your cervix and sent to the lab to look for signs of cervical cancer. If cancer of the cervix is found early, chances for a cure are good. Testing can generally end at age 65, or if a woman has a hysterectomy for a benign condition. Your provider may recommend more frequent testing if certain abnormal results are found.    Bone Mass Measurements:  · A painless x-ray of your bone density to see if you are at risk for a broken bone. Bone density refers to the thickness of bones or how tightly the bone tissue is packed.    Preventive Screening tests for Men    Prostate Screening:  · Should you have a prostate cancer test (PSA)?  It is up to you to decide if you want a prostate cancer test. Talk to your clinician to find out if the test is right for you.  Things for you to consider and talk about should include:  · Benefits and harms of the test  · Your family history  · How your race/ethnicity may influence the test  · If the test may impact other medical conditions you have  · Your values on screenings and treatments    *Medicare pays for many preventive services to keep you healthy. For some of these services, you might have to pay a deductible, coinsurance, and / or copayment.  The amounts vary depending on the type of services you need and the kind of Medicare health plan you have.    For further details on screenings offered by Medicare please visit: https://www.medicare.gov/coverage/preventive-screening-services    n/a

## 2023-09-06 NOTE — OB RN INTRAOPERATIVE NOTE - NS_ROOMOUT_OBGYN_ALL_OB_DT
PRE-SEDATION ASSESSMENT    CONSENT  Risks, benefits, and alternatives have been discussed with the patient/patient representative, and patient/patient representative agrees to proceed: Yes    MEDICAL HISTORY  Significant medical/surgical history: Yes  Cardiac History: Yes    PHYSICAL EXAM  History and Physical Reviewed: H&P completed today  Airway Anatomy : Class II  Heart : Abnormal    OTHER FINDINGS  Reviewed current medications and allergies: Yes  Pertinent lab/diagnostic test reviewed: Yes    SEDATION RISK ASSESSMENT  Risk Status ASA: Class II - Normal patient with mild systemic disease  Plan for Sedation: Moderate Sedation  EKG Monitoring: Yes    NARRATIVE FINDINGS     
08-Jan-2020 11:58

## 2023-10-06 NOTE — REASON FOR VISIT
Consultation - Palliative and Supportive Care   Kp Barragan 67 y.o. male [de-identified]    Patient Active Problem List   Diagnosis   • Benign essential HTN   • Controlled type 2 diabetes mellitus without complication, without long-term current use of insulin ([address])   • Hypercholesterolemia   • Glaucoma   • Inguinal hernia   • Hypokalemia   • Hypocalcemia   • Hyperparathyroidism ([address])   • History of colon polyps   • Primary osteoarthritis of right shoulder   • Chronic left shoulder pain   • Left rotator cuff tear arthropathy   • Rotator cuff injury, right, initial encounter   • Arthritis of right acromioclavicular joint   • BPH associated with nocturia   • Status post right tibial-talar ankle fusion   • Non-recurrent bilateral inguinal hernia without obstruction or gangrene   • Acute pulmonary embolism (HCC)   • Pulmonary mass   • Anemia   • Malignant neoplasm of upper lobe of right lung (HCC)   • Multiple lung nodules on CT   • 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)   • Pleural effusion   • Platelets decreased (HCC)   • Atrial fibrillation with RVR (HCC)   • HILARY (acute kidney injury) ([address])   • Abnormal CT of the abdomen   • COPD with acute exacerbation (HCC)         Assessment:  Metastatic upper lobe of right lung  Pleural effusion  COPD with exacerbation  Abnormal CT of the abdomen  Palliative Care Patient  Goals of care counseling    Plan:  1. Symptom management- per primary team  · Metastatic upper lobe of right lung- continue to follow with Oncology for management and treatment  · Abnormal CT of the abdomen, chest and pelvis- continue to follow with Oncology for management and treatment  · Palliative Care Encounter- ongoing goals of care discussion  · Psychosocial Support- supportive listening provided    2. Goals:  Level 1 code status  • Disease focused care.    • Concerns introduced today include:  • Will continue discussions regarding [address] [place] as patient's clinical presentation evolves. • Encouraged follow up with Palliative Medicine on an outpatient basis after discharge for continued symptom management. Our office will contact patient to schedule a hospital follow up. 3.  Social support:  · Supportive listening provided  · Normalized experience of patient/family  · Provided anxiety containment  · Provided anticipatory guidance  · Encouraged self care    4. Follow up    • Palliative Care will continue to follow for symptom management and goals of care discussions will be ongoing. Please reach out via [employer] if questions or concerns arise. 5. Care Coordination  • Reviewed case with Dr. Lina MCBRIDE                I have reviewed the patient's controlled substance dispensing history in the Prescription Drug Monitoring Program in compliance with the [facility] regulations before prescribing any controlled substances. Last refills:  12/16/2021 Oxycodone 5 mg # 10      Decisional apparatus:  Patient is competent on exam today. If competency is lost, patient's substitute decision maker would default to by [place] Act 169. ER contacts: Juliana Macias, spouse, [phone] (home); [phone] (mobile)  Advance Directive/Living Will: yes  POLST: no  POA Forms: yes  We appreciate the invitation to be involved in this patient's care. We will continue to follow throughout this hospitalization. Please do not hesitate to reach our on call provider through our clinic answering service at [phone] should you have acute symptom control concerns. TAL Mirza, RADHA  Palliative and Supportive Care  Clinic/Answering Service: [phone]  You can find me on [email or web address]!      IDENTIFICATION:  Inpatient consult to Palliative Care  Consult performed by: RADHA Hernandez  Consult ordered by: Lina Sorensen MD        Physician Requesting Consult: Lina Sorensen MD  Date of admission: 10/03/2023  LOS: 3 days  Reason for Consult / Principal Problem: GOC counseling and SM secondary to metastatic cancer, severe cardiomyopathy, uncontrolled atrial fibrillation with rvr        History of Present Illness:  Gordy Cabot is a 67 y.o. male who presents with a palliative diagnosis of metastatic neoplasm of upper lobe of right lung, was brought into the ED at [facility] on 10/03/2023 secondary to progressive shortness of breath and right legswelling for 2 weeks, following last session of chemotherapy. Patient was seen in room, awake and alert, no acute distress. He states he is breathing better following thoracentesis. Denies chest pain, pain, or shortness of at time of visit. Appetite is good, no nausea or vomiting. Review of Systems   Constitutional: Negative for fatigue. Respiratory: Negative for shortness of breath. Cardiovascular: Negative for chest pain. Gastrointestinal: Negative for abdominal pain, diarrhea, nausea and vomiting. Musculoskeletal: Negative. Neurological: Negative for weakness. Past Medical History:   Diagnosis Date   • Diabetes mellitus ([address])    • Hyperlipidemia    • Hypertension    • Lung cancer ([address])    • Prostate cancer ([address]) 2005    S/P prostate ca-2005 had radiation tx.      Past Surgical History:   Procedure Laterality Date   • COLONOSCOPY     • IR BIOPSY LYMPH NODE  9/15/2022   • IR THORACENTESIS  9/13/2022   • IL ARTHRODESIS ANKLE OPEN Right 7/10/2020    Procedure: ARTHRODESIS/ TIBIAL-TALAR ANKLE FUSION;  Surgeon: Flora Rooney MD;  Location: [facility];  Service: Orthopedics   • IL LAPAROSCOPY SURG RPR INITIAL INGUINAL HERNIA Bilateral 12/16/2021    Procedure: LAPAROSCOPIC REPAIR HERNIA INGUINAL WITH MESH;  Surgeon: Shayna Flores MD;  Location: [facility];  Service: General     Social History     Socioeconomic History   • Marital status: /Civil Union     Spouse name: Not on file   • Number of children: Not on file   • Years of education: Not on file   • Highest education level: Not on file Occupational History   • Not on file   Tobacco Use   • Smoking status: Never   • Smokeless tobacco: Never   Vaping Use   • Vaping Use: Never used   Substance and Sexual Activity   • Alcohol use: Yes     Comment: Occasional   • Drug use: Never   • Sexual activity: Not on file   Other Topics Concern   • Not on file   Social History Narrative   • Not on file     Social Determinants of Health     Financial Resource Strain: Low Risk  (7/25/2023)    Overall Financial Resource Strain (CARDIA)    • Difficulty of Paying Living Expenses: Not very hard   Food Insecurity: No Food Insecurity (10/4/2023)    Hunger Vital Sign    • Worried About Running Out of Food in the Last Year: Never true    • Ran Out of Food in the Last Year: Never true   Transportation Needs: No Transportation Needs (10/4/2023)    PRAPARE - Transportation    • Lack of Transportation (Medical): No    • Lack of Transportation (Non-Medical):  No   Physical Activity: Not on file   Stress: Not on file   Social Connections: Not on file   Intimate Partner Violence: Not on file   Housing Stability: Unknown (10/4/2023)    Housing Stability Vital Sign    • Unable to Pay for Housing in the Last Year: No    • Number of Places Lived in the Last Year: 1    • Unstable Housing in the Last Year: Not on file     Family History   Problem Relation Age of Onset   • Heart attack Mother        Medications:  current meds:   Current Facility-Administered Medications   Medication Dose Route Frequency   • amiodarone (CORDARONE) 900 mg in dextrose 5 % 500 mL infusion  1 mg/min Intravenous Continuous   • brimonidine tartrate 0.2 % ophthalmic solution 1 drop  1 drop Left Eye BID   • finasteride (PROSCAR) tablet 5 mg  5 mg Oral Daily   • folic acid (FOLVITE) tablet 1 mg  1 mg Oral Daily   • heparin (porcine) 25,000 units in 0.45% NaCl 250 mL infusion (premix)  3-30 Units/kg/hr (Order-Specific) Intravenous Titrated   • heparin (porcine) injection 2,800 Units  2,800 Units Intravenous Q6H PRN   • heparin (porcine) injection 5,600 Units  5,600 Units Intravenous Q6H PRN   • insulin lispro (HumaLOG) 100 units/mL subcutaneous injection 1-6 Units  1-6 Units Subcutaneous TID AC   • insulin lispro (HumaLOG) 100 units/mL subcutaneous injection 1-6 Units  1-6 Units Subcutaneous HS   • methylPREDNISolone sodium succinate (Solu-MEDROL) injection 40 mg  40 mg Intravenous Q12H SALVATORE   • metoprolol (LOPRESSOR) injection 5 mg  5 mg Intravenous Q4H PRN   • metoprolol tartrate (LOPRESSOR) tablet 25 mg  25 mg Oral Q6H   • pravastatin (PRAVACHOL) tablet 20 mg  20 mg Oral Daily With Dinner   • timolol (TIMOPTIC) 0.5 % ophthalmic solution 1 drop  1 drop Both Eyes Daily   • travoprost (TRAVATAN-Z) 0.004 % ophthalmic solution 1 drop  1 drop Both Eyes HS       No Known Allergies    Objective:  /78 (BP Location: Right arm)   Pulse (!) 108   Temp 98 °F (36.7 °C) (Oral)   Resp 19   Ht 5' 9" (1.753 m)   Wt 75.3 kg (166 lb)   SpO2 90%   BMI 24.51 kg/m²     Physical Exam  Constitutional:       General: He is not in acute distress. Appearance: He is not toxic-appearing. Comments: Chronically ill appearing   Cardiovascular:      Rate and Rhythm: Normal rate. Pulmonary:      Effort: Pulmonary effort is normal.   Skin:     General: Skin is warm and dry. Neurological:      Mental Status: He is alert and oriented to person, place, and time. Psychiatric:         Mood and Affect: Mood normal.           Lab Results: CBC: No results found for: "WBC", "HGB", "HCT", "MCV", "PLT", "ADJUSTEDWBC", "RBC", "MCH", "MCHC", "RDW", "MPV", "NRBC", CMP: No results found for: "SODIUM", "K", "CL", "CO2", "ANIONGAP", "BUN", "CREATININE", "GLUCOSE", "CALCIUM", "AST", "ALT", "ALKPHOS", "PROT", "BILITOT", "EGFR"  Imaging Studies: I have personally reviewed pertinent reports.   Echo complete w/ contrast if indicated    Addendum Date: 10/4/2023 Addendum:   •  Left Ventricle: Left ventricular cavity size is normal. Wall thickness is mildly increased. The left ventricular ejection fraction is 35%. When compared to previous echocardiogram from 2022, left ventricular ejection fraction is significantly reduced. There is severe global hypokinesis. Unable to assess diastolic function due to atrial fibrillation. Tachycardia limits interpretation. •  Right Ventricle: Right ventricular cavity size is mildly dilated. Systolic function is mildly reduced. •  Left Atrium: The atrium is moderately dilated. •  Right Atrium: The atrium is mildly dilated. •  Aortic Valve: There is mild to moderate regurgitation. •  Mitral Valve: There is mild annular calcification. There is moderate regurgitation. •  Tricuspid Valve: There is mild regurgitation. Estimated RVSP 35 mm Hg. •  Aorta: The aortic root is mildly dilated. •  IVC/SVC: The inferior vena cava is dilated. •  Pericardium: There is a small pericardial effusion. Result Date: 10/4/2023  Narrative: •  Left Ventricle: Left ventricular cavity size is normal. Wall thickness is mildly increased. The left ventricular ejection fraction is 35%. When compared to previous echocardiogram from 2022, left ventricular ejection fraction is significantly reduced. There is severe global hypokinesis. Unable to assess diastolic function due to atrial fibrillation. Tachycardia limits interpretation. •  Right Ventricle: Right ventricular cavity size is mildly dilated. Systolic function is mildly reduced. •  Left Atrium: The atrium is moderately dilated. •  Right Atrium: The atrium is mildly dilated. •  Aortic Valve: There is mild to moderate regurgitation. •  Mitral Valve: There is mild annular calcification. There is moderate regurgitation. •  Tricuspid Valve: There is mild regurgitation. Estimated RVSP 35 mm Hg. •  IVC/SVC: The inferior vena cava is dilated. •  Pericardium: There is a small pericardial effusion.      PE Study with CT Abdomen and Pelvis with contrast    Result Date: 10/3/2023  Narrative: CT PULMONARY ANGIOGRAM OF THE CHEST AND CT ABDOMEN AND PELVIS WITH INTRAVENOUS CONTRAST INDICATION:   dyspnea. As per review of electronic medical record, patient with history of prostate cancer in 2005 status post radiation therapy, stage IV adenocarcinoma of the lung initially diagnosed in September 2022 status post SBRT on Osimertinib and just started chemotherapy, bilateral pulmonary emboli on Xarelto COMPARISON: CT of the chest, abdomen and pelvis from 8/8/2023, 4/11/2023, and 9/12/2022 PET/CT from 9/30/2022, CT of the abdomen and pelvis from 6/5/2006, and abdominal MRI from 8/21/2013. TECHNIQUE:  CT examination of the chest, abdomen and pelvis was performed. Thin section CT angiographic technique was used in the chest in order to evaluate for pulmonary embolus and coronal 3D MIP postprocessing was performed on the acquisition scanner. Multiplanar 2D reformatted images were created from the source data. This examination, like all CT scans performed in the [facility], was performed utilizing techniques to minimize radiation dose exposure, including the use of iterative reconstruction and automated exposure control. Radiation dose length product (DLP) for this visit:  1028 mGy-cm IV Contrast:  100 mL of iohexol (OMNIPAQUE) Enteric Contrast:  Enteric contrast was not administered. FINDINGS: CHEST PULMONARY ARTERIAL TREE: Acute pulmonary emboli are seen in the distal left pulmonary artery extending into the left lower lobar and some of the segmental and subsegmental pulmonary arteries. Segmental and subsegmental pulmonary emboli are seen in the lingula. Main pulmonary artery dilated up to 3.7 cm, suggestive of pulmonary arterial hypertension. BRONCHOPULMONARY: There is luminal narrowing in the region of the bifurcation of the bronchus intermedius, which is new since August 2023. This may be secondary to enlarging hilar lymphadenopathy. Otherwise clear central airways.  There is a partially enhancing airspace consolidation in the right lower lobe and the inferior portion of the right middle lobe, new since 8/8/2023 likely in part representing atelectasis. Some of the previously seen nodules and opacities in the right middle and lower lobes are obscured. The spiculated mass in the right upper lobe medially measures 3.2 x 3.6 x 2.6 cm (CC x AP x transverse) (series 601 image 72 and series 3 image 90), compared to 3.8 x 3.9 x 2.3 cm in August 2023. There has been interval development of nodules and opacities in the left lung. For instance some representative nodules/opacities are as follows: - 4 mm left upper lobe nodule (series 3 image 103), new. - 8 mm left lower lobe nodule (series 3 image 160) previously 3 mm. -9 mm right lower lobe nodule medially (series 3 image 169), previously 3 mm. - 1.2 cm left lower lobe nodule abutting the major fissure (series 3 image 192), previously 5 mm. - 2 left lower lobe nodules measuring 1.2 cm and 1.1 cm (series 3 image 219), new. -2.9 x 1.7 cm mass in the left lower lobe laterally (series 3 image 220), previously 6 mm. -7 mm lingular nodule (series 3 image 212), new. PLEURA: Interval development of a small to moderate left pleural effusion and moderate to large right pleural effusion since August 2023. There is likely some loculation of the right apex. No pleural enhancement or air in the pleural space to suggest empyema. No pneumothorax. HEART/GREAT VESSELS: The heart is mildly enlarged. No pericardial effusion. Although the RV/LV ratio is normal, there is reflux of IV contrast into the dilated IVC and hepatic veins, which may indicate right heart failure. Normal caliber thoracic aorta. MEDIASTINUM/LYMPH NODES: Several enlarged mediastinal lymph nodes are overall similar to August 2023, measuring 1.9 cm in the subcarinal region and 1.3 cm in the left lower paratracheal region.  Evaluation for hilar lymphadenopathy is limited by the phase of contrast. The right hilum remains prominent, however discrete lymph nodes are difficult to measure. No convincing left hilar lymphadenopathy. No axillary lymphadenopathy. CHEST WALL AND LOWER NECK: Mild bilateral gynecomastia. ABDOMEN LIVER/BILIARY TREE: The liver is enlarged, measuring 18.6 cm in greatest craniocaudad dimension. Stable liver morphology with relative atrophy of the left hepatic lobe. No focal hepatic lesions. No biliary ductal dilation. GALLBLADDER: A gallstone is seen within the gallbladder. No pericholecystic inflammatory change. SPLEEN: Unremarkable. PANCREAS: There is mild fat stranding adjacent to the head of the pancreas and the gallbladder. The rest of the pancreas is within normal limits. No dilation of the main pancreatic duct. ADRENAL GLANDS:  Unremarkable. KIDNEYS/URETERS: The left kidney is rotated and displaced into the pelvis. There is a duplicated collecting system on the left. Symmetric renal enhancement. No intrarenal or ureteral calculi. No hydronephrosis. There is a simple cyst in the right kidney. Several subcentimeter hypodense lesions are seen in both kidneys which cannot be accurately assessed without IV contrast, however statistically likely represent additional cysts. STOMACH AND BOWEL:  Evaluation of the gastrointestinal tract is somewhat limited by underdistention and lack of oral contrast. No bowel obstruction or convincing inflammation. APPENDIX: Normal appendix. ABDOMINOPELVIC CAVITY:  No pneumoperitoneum. Small amount of free fluid in the pelvis. No evidence of abscess. LYMPH NODES: No abdominal or pelvic lymphadenopathy. VESSELS: Normal caliber aorta. Patent major branch vessels. Scattered atherosclerotic calcifications in the abdominal aorta and its major branches. PELVIS REPRODUCTIVE ORGANS: Normal prostate. URINARY BLADDER: Mild circumferential wall thickening of the urinary bladder. ABDOMINAL WALL/INGUINAL REGIONS: No ventral abdominal wall hernia. MUSCULOSKELETAL:  No focal aggressive osseous lesions. Several subcentimeter densely sclerotic lesions are seen in the iliac bones, the sacrum, and the left 6th rib. These are stable since September 2022, possibly representing bone islands. Degenerative changes of the spine. Impression: CTA CHEST: 1) Acute pulmonary emboli in the left distal pulmonary artery extending into the left lower lobar, and some of the segmental and subsegmental pulmonary arteries with additional segmental and subsegmental pulmonary emboli in the lingula. This was discussed with Dr. Abdi Thomas via HIPAA compliant secure electronic messaging on 10/3/2023 at 6:35 PM with confirmation of receipt. 2) Cardiomegaly. Although the RV/LV ratio is normal, reflux of IV contrast into the dilated IVC and hepatic veins may suggest right heart failure. 3) Main pulmonary artery dilated up to 3.7 cm, suggestive of pulmonary arterial hypertension. 4) Moderate to large right pleural effusion with likely loculation at the apex, and small to moderate left pleural effusion, new since August 2023. No evidence of empyema. 5) Luminal narrowing in the region of the bifurcation of the bronchus intermedius, which is new since August 2023, possibly secondary to enlarging hilar lymphadenopathy or increasing postradiation fibrosis. 6) Airspace consolidations in the right middle and lower lobes, new since August 2023, likely at least in part representing atelectasis, likely secondary to the aforementioned partial bronchial occlusion. Some of the previously seen right middle and lower lobe nodules/opacities are obscured. 7) Right upper lobe mass overall stable to mildly enlarged since August 2023. 8) Significant progression of metastatic disease throughout the left lung, with new enlarged nodules/masses, as detailed above. 9) Overall similar mediastinal lymphadenopathy compared to August 2023.  Right hilum remains prominent, however discrete lymph nodes difficult to measure due to the phase of contrast. CT ABDOMEN/PELVIS: 10) Mild fat stranding adjacent to head of the pancreas and the osbaldo hepatis/gallbladder. Recommend correlation with lipase level to exclude pancreatitis. No findings to suggest acute cholecystitis. No organized collections. 11) Mild circumferential wall thickening of the urinary bladder. Recommend correlation with urinalysis to exclude cystitis. 12) No other acute abdominal or pelvic pathology. 13) Additional findings as above. Workstation performed: [id number]     VAS lower limb venous duplex study, unilateral/limited    Result Date: 10/3/2023  Narrative:  [facility] REPORT CLINICAL: Indications: Patient presents with right lower extremity edema x one month. Operative History: no cardiovascular surgeries Risk Factors The patient has history of HTN, Diabetes (NIDDM (oral meds)) and Hyperlipidemia. FINDINGS:  Right          Impression              Thrombus           FV Prox        Occlusive Subsegmental  Acute - Occlusive  FV Mid         Occlusive Subsegmental  Acute - Occlusive  FV Dist        Occlusive Subsegmental  Acute - Occlusive  PFV            Occlusive Subsegmental  Acute - Occlusive  Popliteal      Occlusive Subsegmental  Acute - Occlusive  PostTibial     Occlusive Subsegmental  Acute - Occlusive  Peroneal       Occlusive Subsegmental  Acute - Occlusive  Gastrocnemius  Occlusive Subsegmental  Acute - Occlusive     CONCLUSION: Impression: RIGHT LOWER LIMB Evidence suggestive of Acute occlusive deep vein thrombosis noted in the Profunda, proximal-distal femoral, popliteal, gastrocnemius, posterior tibial, and peroneal veins. No evidence of superficial thrombophlebitis noted. Popliteal and posterior tibial arterial Doppler waveform's are triphasic. LEFT LOWER LIMB LIMITED Evaluation shows no evidence of thrombus in the common femoral vein. Doppler evaluation shows a normal response to augmentation maneuvers.   Tech Note: There is an echogenic structure located in the Right inguinal region measuring approximately 1.7 x 0.8 x 3.6 cm suggestive of enlarged lymphatic channels. Technical findings were given to [facility] on the floor. SIGNATURE: Electronically Signed by: Taj Paige on 2023-10-03 05:17:43 PM    XR chest 1 view portable    Result Date: 10/3/2023  Narrative: CHEST INDICATION:   sob. COMPARISON: Chest CT 8/8/2023, CXR 9/10/2022 and 10/14/2022. EXAM PERFORMED/VIEWS:  XR CHEST PORTABLE. FINDINGS: Mild cardiomegaly. Moderate right and small left pleural effusion with right base atelectasis, new since August 2023. Redemonstration of right upper lobe nodule. No pneumothorax. Upper abdomen normal. Bones normal for age. Impression: Moderate right and small left pleural effusion with right base atelectasis, new since August 2023. Right base pneumonia not excluded in the appropriate clinical setting. Workstation performed: [id number]      EKG, Pathology, and Other Studies: I have personally reviewed pertinent reports. Counseling / Coordination of Care  Total floor / unit time spent today 60 minutes. Greater than 50% of total time was spent with the patient and / or family counseling and / or coordination of care. A description of the counseling / coordination of care: Reviewed chart,  provided medical updates, determined goals of care, discussed palliative care and symptom management, provided anticipatory guidance, determined competency and POA/HCA, determined social/family support, provided psychosocial support. Reviewed with SLIM. Portions of this document may have been created using dictation software and as such some "sound alike" terms may have been generated by the system. Do not hesitate to contact me with any questions or clarifications. [Initial Visit] : an initial visit for [Neck Pain] : neck pain [Radiculopathy] : radiculopathy

## 2023-10-30 NOTE — ED ADULT TRIAGE NOTE - SOURCE OF INFORMATION
Patient Libtayo Pregnancy And Lactation Text: This medication is contraindicated in pregnancy and when breast feeding. Same Histology In Subsequent Stages Text: The pattern and morphology of the tumor is as described in the first stage.

## 2023-11-24 NOTE — ED ADULT NURSE NOTE - OBJECTIVE STATEMENT
done patient arrives a*ox3 amb to intake c/o abdominal discomfort x 5 days, was seen at urgent care several days ago and diagnosed with uti, has been on abx, denies any urinary symptoms, pt is 7 weeks pregnant, denies any vaginal bleeding or discharge, no fevers however endorses chills @ home earlier today, 22g ivsl placed, all pending labs drawn and sent, resps even and unlabored, will ctm closely.

## 2023-12-07 NOTE — OB RN TRIAGE NOTE - HEART RATE (BEATS/MIN)
Received VM transcription from today, 10:18 AM:    Hi, this is [employer]. I was just calling to see if I could get a prescription. I just called the pharmacy and it actually ran out. So if you could please give me a call back at [phone]. Thank you.  ---------------------    Pt did not indicate what medication she is calling about however, pharmacy requested for Fioricet and script was sent today. 131

## 2023-12-14 ENCOUNTER — APPOINTMENT (OUTPATIENT)
Dept: GASTROENTEROLOGY | Facility: CLINIC | Age: 33
End: 2023-12-14

## 2023-12-19 ENCOUNTER — APPOINTMENT (OUTPATIENT)
Dept: OBGYN | Facility: CLINIC | Age: 33
End: 2023-12-19
Payer: COMMERCIAL

## 2023-12-19 VITALS
WEIGHT: 293 LBS | HEIGHT: 70 IN | BODY MASS INDEX: 41.95 KG/M2 | DIASTOLIC BLOOD PRESSURE: 80 MMHG | SYSTOLIC BLOOD PRESSURE: 130 MMHG

## 2023-12-19 DIAGNOSIS — Z11.3 ENCOUNTER FOR SCREENING FOR INFECTIONS WITH A PREDOMINANTLY SEXUAL MODE OF TRANSMISSION: ICD-10-CM

## 2023-12-19 DIAGNOSIS — D06.9 CARCINOMA IN SITU OF CERVIX, UNSPECIFIED: ICD-10-CM

## 2023-12-19 DIAGNOSIS — D50.9 IRON DEFICIENCY ANEMIA, UNSPECIFIED: ICD-10-CM

## 2023-12-19 LAB
BASOPHILS # BLD AUTO: 0.02 K/UL
BASOPHILS NFR BLD AUTO: 0.4 %
EOSINOPHIL # BLD AUTO: 0.08 K/UL
EOSINOPHIL NFR BLD AUTO: 1.6 %
HCG UR QL: NEGATIVE
HCT VFR BLD CALC: 40.6 %
HGB BLD-MCNC: 13.1 G/DL
IMM GRANULOCYTES NFR BLD AUTO: 0.2 %
LYMPHOCYTES # BLD AUTO: 1.26 K/UL
LYMPHOCYTES NFR BLD AUTO: 25.5 %
MAN DIFF?: NORMAL
MCHC RBC-ENTMCNC: 28 PG
MCHC RBC-ENTMCNC: 32.3 GM/DL
MCV RBC AUTO: 86.8 FL
MONOCYTES # BLD AUTO: 0.42 K/UL
MONOCYTES NFR BLD AUTO: 8.5 %
NEUTROPHILS # BLD AUTO: 3.15 K/UL
NEUTROPHILS NFR BLD AUTO: 63.8 %
PLATELET # BLD AUTO: 227 K/UL
QUALITY CONTROL: YES
RBC # BLD: 4.68 M/UL
RBC # FLD: 13.2 %
WBC # FLD AUTO: 4.94 K/UL

## 2023-12-19 PROCEDURE — 36415 COLL VENOUS BLD VENIPUNCTURE: CPT

## 2023-12-19 PROCEDURE — 57454 BX/CURETT OF CERVIX W/SCOPE: CPT

## 2023-12-19 PROCEDURE — 81025 URINE PREGNANCY TEST: CPT

## 2023-12-19 PROCEDURE — 99213 OFFICE O/P EST LOW 20 MIN: CPT | Mod: 25

## 2023-12-19 NOTE — REVIEW OF SYSTEMS
[Fatigue] : fatigue [Sore Throat] : sore throat [Abdominal Pain] : abdominal pain [Diarrhea] : diarrhea [Heartburn] : heartburn [Dysuria] : dysuria [Abn Vaginal bleeding] : abnormal vaginal bleeding [Pelvic pain] : pelvic pain [Hot Flashes] : hot flashes [Negative] : Heme/Lymph

## 2023-12-19 NOTE — PROCEDURE
[Colposcopy] : Colposcopy  [Time out performed] : Pre-procedure time out performed.  Patient's name, date of birth and procedure confirmed. [Consent Obtained] : Consent obtained [Risks] : risks [Benefits] : benefits [Alternatives] : alternatives [Patient] : patient [Infection] : infection [Bleeding] : bleeding [Allergic Reaction] : allergic reaction [No Premedication] : no premedication [Colposcopy Adequate] : colposcopy adequate [SCI Fully Visualized] : SCI fully visualized [ECC Performed] : ECC performed [No Abnormalities] : no abnormalities [Hemostasis Obtained] : Hemostasis obtained [Tolerated Well] : the patient tolerated the procedure well [de-identified] : hx brynn 3 [de-identified] : ecc, 10 [de-identified] : JORDAN

## 2023-12-21 LAB
C TRACH RRNA SPEC QL NAA+PROBE: NOT DETECTED
FERRITIN SERPL-MCNC: 58 NG/ML
FOLATE SERPL-MCNC: 12.9 NG/ML
HBV SURFACE AG SER QL: NONREACTIVE
HCV AB SER QL: NONREACTIVE
HCV S/CO RATIO: 0.12 S/CO
HIV1+2 AB SPEC QL IA.RAPID: NONREACTIVE
IRON SATN MFR SERPL: 23 %
IRON SERPL-MCNC: 69 UG/DL
N GONORRHOEA RRNA SPEC QL NAA+PROBE: NOT DETECTED
SOURCE AMPLIFICATION: NORMAL
SOURCE AMPLIFICATION: NORMAL
T PALLIDUM AB SER QL IA: NEGATIVE
T VAGINALIS RRNA SPEC QL NAA+PROBE: NOT DETECTED
TIBC SERPL-MCNC: 302 UG/DL
TSH SERPL-ACNC: 1.08 UIU/ML
UIBC SERPL-MCNC: 233 UG/DL
VIT B12 SERPL-MCNC: 638 PG/ML

## 2023-12-29 LAB — CORE LAB BIOPSY: NORMAL

## 2024-01-01 NOTE — ED PROVIDER NOTE - CARDIAC, MLM
"At approximately 1225, it was noted that the arterial line waveform was flat. Went in and assessed the patient and the line. There were no loose connections, clamps or kinks in the line. The lights were turned on and it was noted that there was some swelling around the edges of the dressing. All of the patient's fingers were assessed and there was good capillary refill. Called the [facility]DP SARITHA to come and assess the line. The SARITHA was able to draw blood from the line and stated that the line \"flushed easily.\" Once the flush was done, the waveform returned. Per the recommendation of the TDP SARITHA, the arterial line was re-zeroed.    " Normal rate, regular rhythm.  Heart sounds S1, S2.  No murmurs, rubs or gallops.

## 2024-01-31 NOTE — ED PROVIDER NOTE - CROS ED NEURO ALL NEG
Photo Preface (Leave Blank If You Do Not Want): Photographs were obtained today Detail Level: Zone - - -

## 2024-02-25 ENCOUNTER — EMERGENCY (EMERGENCY)
Facility: HOSPITAL | Age: 34
LOS: 1 days | Discharge: HOME | End: 2024-02-25
Attending: EMERGENCY MEDICINE
Payer: COMMERCIAL

## 2024-02-25 VITALS
HEIGHT: 70 IN | OXYGEN SATURATION: 100 % | SYSTOLIC BLOOD PRESSURE: 147 MMHG | HEART RATE: 90 BPM | TEMPERATURE: 99 F | WEIGHT: 287.04 LBS | RESPIRATION RATE: 18 BRPM | DIASTOLIC BLOOD PRESSURE: 83 MMHG

## 2024-02-25 VITALS
SYSTOLIC BLOOD PRESSURE: 117 MMHG | HEART RATE: 72 BPM | RESPIRATION RATE: 18 BRPM | DIASTOLIC BLOOD PRESSURE: 80 MMHG | TEMPERATURE: 98 F

## 2024-02-25 DIAGNOSIS — Z98.891 HISTORY OF UTERINE SCAR FROM PREVIOUS SURGERY: Chronic | ICD-10-CM

## 2024-02-25 DIAGNOSIS — K08.409 PARTIAL LOSS OF TEETH, UNSPECIFIED CAUSE, UNSPECIFIED CLASS: Chronic | ICD-10-CM

## 2024-02-25 LAB
ALBUMIN SERPL ELPH-MCNC: 4.6 G/DL — SIGNIFICANT CHANGE UP (ref 3.3–5)
ALP SERPL-CCNC: 63 U/L — SIGNIFICANT CHANGE UP (ref 40–120)
ALT FLD-CCNC: 28 U/L — SIGNIFICANT CHANGE UP (ref 10–45)
ANION GAP SERPL CALC-SCNC: 12 MMOL/L — SIGNIFICANT CHANGE UP (ref 5–17)
APPEARANCE UR: CLEAR — SIGNIFICANT CHANGE UP
APTT BLD: 30.1 SEC — SIGNIFICANT CHANGE UP (ref 24.5–35.6)
AST SERPL-CCNC: 19 U/L — SIGNIFICANT CHANGE UP (ref 10–40)
BACTERIA # UR AUTO: NEGATIVE /HPF — SIGNIFICANT CHANGE UP
BASE EXCESS BLDV CALC-SCNC: 0.1 MMOL/L — SIGNIFICANT CHANGE UP (ref -2–3)
BASOPHILS # BLD AUTO: 0 K/UL — SIGNIFICANT CHANGE UP (ref 0–0.2)
BASOPHILS NFR BLD AUTO: 0 % — SIGNIFICANT CHANGE UP (ref 0–2)
BILIRUB SERPL-MCNC: 0.4 MG/DL — SIGNIFICANT CHANGE UP (ref 0.2–1.2)
BILIRUB UR-MCNC: NEGATIVE — SIGNIFICANT CHANGE UP
BUN SERPL-MCNC: 13 MG/DL — SIGNIFICANT CHANGE UP (ref 7–23)
CA-I SERPL-SCNC: 1.25 MMOL/L — SIGNIFICANT CHANGE UP (ref 1.15–1.33)
CALCIUM SERPL-MCNC: 9.8 MG/DL — SIGNIFICANT CHANGE UP (ref 8.4–10.5)
CAST: 0 /LPF — SIGNIFICANT CHANGE UP (ref 0–4)
CHLORIDE BLDV-SCNC: 105 MMOL/L — SIGNIFICANT CHANGE UP (ref 96–108)
CHLORIDE SERPL-SCNC: 104 MMOL/L — SIGNIFICANT CHANGE UP (ref 96–108)
CO2 BLDV-SCNC: 27 MMOL/L — HIGH (ref 22–26)
CO2 SERPL-SCNC: 25 MMOL/L — SIGNIFICANT CHANGE UP (ref 22–31)
COLOR SPEC: YELLOW — SIGNIFICANT CHANGE UP
CREAT SERPL-MCNC: 0.53 MG/DL — SIGNIFICANT CHANGE UP (ref 0.5–1.3)
DIFF PNL FLD: NEGATIVE — SIGNIFICANT CHANGE UP
EGFR: 124 ML/MIN/1.73M2 — SIGNIFICANT CHANGE UP
EOSINOPHIL # BLD AUTO: 0.08 K/UL — SIGNIFICANT CHANGE UP (ref 0–0.5)
EOSINOPHIL NFR BLD AUTO: 0.9 % — SIGNIFICANT CHANGE UP (ref 0–6)
GAS PNL BLDV: 138 MMOL/L — SIGNIFICANT CHANGE UP (ref 136–145)
GAS PNL BLDV: SIGNIFICANT CHANGE UP
GLUCOSE BLDV-MCNC: 87 MG/DL — SIGNIFICANT CHANGE UP (ref 70–99)
GLUCOSE SERPL-MCNC: 92 MG/DL — SIGNIFICANT CHANGE UP (ref 70–99)
GLUCOSE UR QL: NEGATIVE MG/DL — SIGNIFICANT CHANGE UP
HCG SERPL-ACNC: <2 MIU/ML — SIGNIFICANT CHANGE UP
HCO3 BLDV-SCNC: 26 MMOL/L — SIGNIFICANT CHANGE UP (ref 22–29)
HCT VFR BLD CALC: 40 % — SIGNIFICANT CHANGE UP (ref 34.5–45)
HCT VFR BLDA CALC: 39 % — SIGNIFICANT CHANGE UP (ref 34.5–46.5)
HGB BLD CALC-MCNC: 12.9 G/DL — SIGNIFICANT CHANGE UP (ref 11.7–16.1)
HGB BLD-MCNC: 13.4 G/DL — SIGNIFICANT CHANGE UP (ref 11.5–15.5)
INR BLD: 0.91 RATIO — SIGNIFICANT CHANGE UP (ref 0.85–1.18)
KETONES UR-MCNC: NEGATIVE MG/DL — SIGNIFICANT CHANGE UP
LACTATE BLDV-MCNC: 1.1 MMOL/L — SIGNIFICANT CHANGE UP (ref 0.5–2)
LEUKOCYTE ESTERASE UR-ACNC: NEGATIVE — SIGNIFICANT CHANGE UP
LIDOCAIN IGE QN: 24 U/L — SIGNIFICANT CHANGE UP (ref 7–60)
LYMPHOCYTES # BLD AUTO: 2.38 K/UL — SIGNIFICANT CHANGE UP (ref 1–3.3)
LYMPHOCYTES # BLD AUTO: 27.8 % — SIGNIFICANT CHANGE UP (ref 13–44)
MANUAL SMEAR VERIFICATION: SIGNIFICANT CHANGE UP
MCHC RBC-ENTMCNC: 28.3 PG — SIGNIFICANT CHANGE UP (ref 27–34)
MCHC RBC-ENTMCNC: 33.5 GM/DL — SIGNIFICANT CHANGE UP (ref 32–36)
MCV RBC AUTO: 84.4 FL — SIGNIFICANT CHANGE UP (ref 80–100)
MONOCYTES # BLD AUTO: 0.52 K/UL — SIGNIFICANT CHANGE UP (ref 0–0.9)
MONOCYTES NFR BLD AUTO: 6.1 % — SIGNIFICANT CHANGE UP (ref 2–14)
NEUTROPHILS # BLD AUTO: 5.58 K/UL — SIGNIFICANT CHANGE UP (ref 1.8–7.4)
NEUTROPHILS NFR BLD AUTO: 65.2 % — SIGNIFICANT CHANGE UP (ref 43–77)
NITRITE UR-MCNC: NEGATIVE — SIGNIFICANT CHANGE UP
PCO2 BLDV: 46 MMHG — HIGH (ref 39–42)
PH BLDV: 7.36 — SIGNIFICANT CHANGE UP (ref 7.32–7.43)
PH UR: 6.5 — SIGNIFICANT CHANGE UP (ref 5–8)
PLAT MORPH BLD: NORMAL — SIGNIFICANT CHANGE UP
PLATELET # BLD AUTO: 264 K/UL — SIGNIFICANT CHANGE UP (ref 150–400)
PO2 BLDV: 40 MMHG — SIGNIFICANT CHANGE UP (ref 25–45)
POTASSIUM BLDV-SCNC: 4 MMOL/L — SIGNIFICANT CHANGE UP (ref 3.5–5.1)
POTASSIUM SERPL-MCNC: 3.8 MMOL/L — SIGNIFICANT CHANGE UP (ref 3.5–5.3)
POTASSIUM SERPL-SCNC: 3.8 MMOL/L — SIGNIFICANT CHANGE UP (ref 3.5–5.3)
PROT SERPL-MCNC: 7.5 G/DL — SIGNIFICANT CHANGE UP (ref 6–8.3)
PROT UR-MCNC: NEGATIVE MG/DL — SIGNIFICANT CHANGE UP
PROTHROM AB SERPL-ACNC: 10 SEC — SIGNIFICANT CHANGE UP (ref 9.5–13)
RBC # BLD: 4.74 M/UL — SIGNIFICANT CHANGE UP (ref 3.8–5.2)
RBC # FLD: 13.2 % — SIGNIFICANT CHANGE UP (ref 10.3–14.5)
RBC BLD AUTO: SIGNIFICANT CHANGE UP
RBC CASTS # UR COMP ASSIST: 3 /HPF — SIGNIFICANT CHANGE UP (ref 0–4)
SAO2 % BLDV: 60.5 % — LOW (ref 67–88)
SODIUM SERPL-SCNC: 141 MMOL/L — SIGNIFICANT CHANGE UP (ref 135–145)
SP GR SPEC: >1.03 — HIGH (ref 1–1.03)
SQUAMOUS # UR AUTO: 3 /HPF — SIGNIFICANT CHANGE UP (ref 0–5)
UROBILINOGEN FLD QL: 1 MG/DL — SIGNIFICANT CHANGE UP (ref 0.2–1)
WBC # BLD: 8.56 K/UL — SIGNIFICANT CHANGE UP (ref 3.8–10.5)
WBC # FLD AUTO: 8.56 K/UL — SIGNIFICANT CHANGE UP (ref 3.8–10.5)
WBC UR QL: 0 /HPF — SIGNIFICANT CHANGE UP (ref 0–5)

## 2024-02-25 PROCEDURE — 83690 ASSAY OF LIPASE: CPT

## 2024-02-25 PROCEDURE — 80053 COMPREHEN METABOLIC PANEL: CPT

## 2024-02-25 PROCEDURE — 83605 ASSAY OF LACTIC ACID: CPT

## 2024-02-25 PROCEDURE — 96374 THER/PROPH/DIAG INJ IV PUSH: CPT | Mod: XU

## 2024-02-25 PROCEDURE — 87086 URINE CULTURE/COLONY COUNT: CPT

## 2024-02-25 PROCEDURE — 96375 TX/PRO/DX INJ NEW DRUG ADDON: CPT

## 2024-02-25 PROCEDURE — 85018 HEMOGLOBIN: CPT

## 2024-02-25 PROCEDURE — 82330 ASSAY OF CALCIUM: CPT

## 2024-02-25 PROCEDURE — 85014 HEMATOCRIT: CPT

## 2024-02-25 PROCEDURE — 76937 US GUIDE VASCULAR ACCESS: CPT | Mod: 26

## 2024-02-25 PROCEDURE — 99284 EMERGENCY DEPT VISIT MOD MDM: CPT | Mod: 25

## 2024-02-25 PROCEDURE — 71046 X-RAY EXAM CHEST 2 VIEWS: CPT | Mod: 26

## 2024-02-25 PROCEDURE — 74177 CT ABD & PELVIS W/CONTRAST: CPT | Mod: 26,MC

## 2024-02-25 PROCEDURE — 82435 ASSAY OF BLOOD CHLORIDE: CPT

## 2024-02-25 PROCEDURE — 85025 COMPLETE CBC W/AUTO DIFF WBC: CPT

## 2024-02-25 PROCEDURE — 85730 THROMBOPLASTIN TIME PARTIAL: CPT

## 2024-02-25 PROCEDURE — 74177 CT ABD & PELVIS W/CONTRAST: CPT | Mod: MC

## 2024-02-25 PROCEDURE — 84702 CHORIONIC GONADOTROPIN TEST: CPT

## 2024-02-25 PROCEDURE — 84295 ASSAY OF SERUM SODIUM: CPT

## 2024-02-25 PROCEDURE — 82947 ASSAY GLUCOSE BLOOD QUANT: CPT

## 2024-02-25 PROCEDURE — 82803 BLOOD GASES ANY COMBINATION: CPT

## 2024-02-25 PROCEDURE — 81001 URINALYSIS AUTO W/SCOPE: CPT

## 2024-02-25 PROCEDURE — 76937 US GUIDE VASCULAR ACCESS: CPT

## 2024-02-25 PROCEDURE — 85610 PROTHROMBIN TIME: CPT

## 2024-02-25 PROCEDURE — 99285 EMERGENCY DEPT VISIT HI MDM: CPT

## 2024-02-25 PROCEDURE — 36000 PLACE NEEDLE IN VEIN: CPT

## 2024-02-25 PROCEDURE — 84132 ASSAY OF SERUM POTASSIUM: CPT

## 2024-02-25 PROCEDURE — 71046 X-RAY EXAM CHEST 2 VIEWS: CPT

## 2024-02-25 RX ORDER — KETOROLAC TROMETHAMINE 30 MG/ML
15 SYRINGE (ML) INJECTION ONCE
Refills: 0 | Status: DISCONTINUED | OUTPATIENT
Start: 2024-02-25 | End: 2024-02-25

## 2024-02-25 RX ORDER — ACETAMINOPHEN 500 MG
1000 TABLET ORAL ONCE
Refills: 0 | Status: COMPLETED | OUTPATIENT
Start: 2024-02-25 | End: 2024-02-25

## 2024-02-25 RX ORDER — ONDANSETRON 8 MG/1
4 TABLET, FILM COATED ORAL ONCE
Refills: 0 | Status: COMPLETED | OUTPATIENT
Start: 2024-02-25 | End: 2024-02-25

## 2024-02-25 RX ORDER — SODIUM CHLORIDE 9 MG/ML
1000 INJECTION INTRAMUSCULAR; INTRAVENOUS; SUBCUTANEOUS ONCE
Refills: 0 | Status: COMPLETED | OUTPATIENT
Start: 2024-02-25 | End: 2024-02-25

## 2024-02-25 RX ADMIN — ONDANSETRON 4 MILLIGRAM(S): 8 TABLET, FILM COATED ORAL at 18:31

## 2024-02-25 RX ADMIN — SODIUM CHLORIDE 1000 MILLILITER(S): 9 INJECTION INTRAMUSCULAR; INTRAVENOUS; SUBCUTANEOUS at 19:16

## 2024-02-25 RX ADMIN — Medication 15 MILLIGRAM(S): at 20:55

## 2024-02-25 RX ADMIN — Medication 400 MILLIGRAM(S): at 18:32

## 2024-02-25 NOTE — ED PROVIDER NOTE - NS ED ATTENDING STATEMENT MOD
Attending with This was a shared visit with the ELISABET. I reviewed and verified the documentation.

## 2024-02-25 NOTE — ED ADULT NURSE NOTE - OBJECTIVE STATEMENT
34 y.o. female coming in from home via private car for right flank pain and RUQ pain x 2 weeks. pt states that she does not have any pain/burning on urination. pt states that the pain is radiating from her right flank to her RUQ. pt denies PMH. PSH . A&Ox3, vss, pt denies CP/SOB/weakness/dizziness/fevers/chills/V/D/urinary symptoms, no other complaints at this time. bed in lowest position, call bell in reach and teaching on use completed.

## 2024-02-25 NOTE — ED PROVIDER NOTE - ABDOMINAL TENDER
+ttp RUQ and epigastric, no rebound, guarding or rigidity. No McBurney's ttp. No CVA ttp b/l. No rash or vesicles overlying area of pain.

## 2024-02-25 NOTE — ED PROVIDER NOTE - ATTENDING APP SHARED VISIT CONTRIBUTION OF CARE
34-year-old female history of prior  and LEEP procedure presenting for evaluation of right-sided abdominal pain.  Reports that she has had pain intermittently for the last 3 months but is worsened in the last 2 to 3 days.  Pain is associated with nausea no vomiting located in the right upper abdomen below the rib margin with radiation around the right lateral rib cage to the right flank.  There are no skin rashes there is no pain with respiration.  Patient has decreased appetite and resultantly decreased p.o. intake however taking p.o. does not significantly increase the pain.  Vital signs are within normal limits.  Oral temp is 99.2.  Patient denies any chills or fevers at home.  Physical exam shows well-appearing adult female with elevated BMI.  Normal heart and lung sounds.  Positive bowel sounds abdomen soft right upper quadrant numbness to palpation.  No CVA tenderness.  Extremities warm and well-perfused.  Differential diagnosis includes but is not limited to biliary colic, renal colic, pancreatitis, colitis, musculoskeletal pain.  Less likely pulmonary pathology as no change with respiration no shortness of breath no abnormal lung sounds.  Less likely zoster given duration of symptoms months.  Will check labs CT abdomen pelvis chest x-ray pain medications and reevaluate.  Dispo depending upon results of labs and imaging. Also seen w resident     34-year-old female history of prior  and LEEP procedure presenting for evaluation of right-sided abdominal pain.  Reports that she has had pain intermittently for the last 3 months but is worsened in the last 2 to 3 days.  Pain is associated with nausea no vomiting located in the right upper abdomen below the rib margin with radiation around the right lateral rib cage to the right flank.  There are no skin rashes there is no pain with respiration.  Patient has decreased appetite and resultantly decreased p.o. intake however taking p.o. does not significantly increase the pain.  Vital signs are within normal limits.  Oral temp is 99.2.  Patient denies any chills or fevers at home.  Physical exam shows well-appearing adult female with elevated BMI.  Normal heart and lung sounds.  Positive bowel sounds abdomen soft right upper quadrant numbness to palpation.  No CVA tenderness.  Extremities warm and well-perfused.  Differential diagnosis includes but is not limited to biliary colic, renal colic, pancreatitis, colitis, musculoskeletal pain.  Less likely pulmonary pathology as no change with respiration no shortness of breath no abnormal lung sounds.  Less likely zoster given duration of symptoms months.  Will check labs CT abdomen pelvis chest x-ray pain medications and reevaluate.  Dispo depending upon results of labs and imaging.

## 2024-02-25 NOTE — ED PROVIDER NOTE - ATTENDING CONTRIBUTION TO CARE
34-year-old female history of prior  and LEEP procedure presenting for evaluation of right-sided abdominal pain.  Reports that she has had pain intermittently for the last 3 months but is worsened in the last 2 to 3 days.  Pain is associated with nausea no vomiting located in the right upper abdomen below the rib margin with radiation around the right lateral rib cage to the right flank.  There are no skin rashes there is no pain with respiration.  Patient has decreased appetite and resultantly decreased p.o. intake however taking p.o. does not significantly increase the pain.  Vital signs are within normal limits.  Oral temp is 99.2.  Patient denies any chills or fevers at home.  Physical exam shows well-appearing adult female with elevated BMI.  Normal heart and lung sounds.  Positive bowel sounds abdomen soft right upper quadrant numbness to palpation.  No CVA tenderness.  Extremities warm and well-perfused.  Differential diagnosis includes but is not limited to biliary colic, renal colic, pancreatitis, colitis, musculoskeletal pain.  Less likely pulmonary pathology as no change with respiration no shortness of breath no abnormal lung sounds.  Less likely zoster given duration of symptoms months.  Will check labs CT abdomen pelvis chest x-ray pain medications and reevaluate.  Dispo depending upon results of labs and imaging.

## 2024-02-25 NOTE — ED ADULT TRIAGE NOTE - ISOLATION TYPE:
[de-identified] : right knee pain 1 week no trauma just getting off the elevator has been on her feet a bit more for work than usual no prior has gotten mostly better None

## 2024-02-25 NOTE — ED PROCEDURE NOTE - NS ED ATTENDING STATEMENT MOD
This was a shared visit with the ELISABET. I reviewed and verified the documentation.
This was a shared visit with the ELISABET. I reviewed and verified the documentation.

## 2024-02-25 NOTE — ED PROVIDER NOTE - OBJECTIVE STATEMENT
35 yo female pshx LEEPS,  presents to ED c/o 3 weeks of R side upper abdominal pain radiating to R flank and R lower abdomen. Never had sxs like this before. Reports associated. 33 yo female pshx LEEPS,  presents to ED c/o 3 weeks of R side upper abdominal pain radiating to R flank and R lower abdomen. Never had sxs like this before. Reports associated nausea but no vomiting. No rash overlying area of pain. +Decreased appetite over same timeframe. Denies fever/chills, hemoptysis, cough, cp, sob, urinary urgency, frequency, dysuria, vaginal discharge, weakness, numbness/tingling.

## 2024-02-25 NOTE — ED PROVIDER NOTE - NS_EDPROVIDERDISPOUSERTYPE_ED_A_ED
Attending Attestation (For Attendings USE Only)... Ear Wedge Repair Text: A wedge excision was completed by carrying down an excision through the full thickness of the ear and cartilage with an inward facing Burow's triangle. The wound was then closed in a layered fashion.

## 2024-02-25 NOTE — ED PROVIDER NOTE - PATIENT PORTAL LINK FT
You can access the FollowMyHealth Patient Portal offered by [facility] by registering at the following website: [email or web address]. By joining [employer]’s FollowMyHealth portal, you will also be able to view your health information using other applications (apps) compatible with our system.

## 2024-02-25 NOTE — ED ADULT TRIAGE NOTE - CHIEF COMPLAINT QUOTE
R flank pain radiating to R middle abdomen a/w nausea & decreased PO intake  denies any urinary symptoms

## 2024-02-26 LAB
CULTURE RESULTS: SIGNIFICANT CHANGE UP
SPECIMEN SOURCE: SIGNIFICANT CHANGE UP

## 2024-04-17 ENCOUNTER — NON-APPOINTMENT (OUTPATIENT)
Age: 34
End: 2024-04-17

## 2024-04-30 ENCOUNTER — APPOINTMENT (OUTPATIENT)
Dept: OBGYN | Facility: CLINIC | Age: 34
End: 2024-04-30
Payer: COMMERCIAL

## 2024-04-30 VITALS
DIASTOLIC BLOOD PRESSURE: 70 MMHG | HEIGHT: 70 IN | SYSTOLIC BLOOD PRESSURE: 120 MMHG | WEIGHT: 285 LBS | BODY MASS INDEX: 40.8 KG/M2

## 2024-04-30 DIAGNOSIS — Z87.898 PERSONAL HISTORY OF OTHER SPECIFIED CONDITIONS: ICD-10-CM

## 2024-04-30 DIAGNOSIS — Z87.448 PERSONAL HISTORY OF OTHER DISEASES OF URINARY SYSTEM: ICD-10-CM

## 2024-04-30 DIAGNOSIS — Z87.42 PERSONAL HISTORY OF OTHER DISEASES OF THE FEMALE GENITAL TRACT: ICD-10-CM

## 2024-04-30 DIAGNOSIS — Z36.89 ENCOUNTER FOR OTHER SPECIFIED ANTENATAL SCREENING: ICD-10-CM

## 2024-04-30 PROCEDURE — 0501F PRENATAL FLOW SHEET: CPT

## 2024-04-30 PROCEDURE — 81025 URINE PREGNANCY TEST: CPT

## 2024-04-30 PROCEDURE — 76817 TRANSVAGINAL US OBSTETRIC: CPT

## 2024-04-30 PROCEDURE — 81003 URINALYSIS AUTO W/O SCOPE: CPT | Mod: NC,QW

## 2024-04-30 RX ORDER — ASCORBIC ACID, CHOLECALCIFEROL, .ALPHA.-TOCOPHEROL ACETATE, DL-, PYRIDOXINE, FOLIC ACID, CYANOCOBALAMIN, CALCIUM, FERROUS FUMARATE, MAGNESIUM, DOCONEXENT 85; 200; 10; 25; 1; 12; 140; 27; 45; 300 [IU]/1; [IU]/1; [IU]/1; [IU]/1; MG/1; UG/1; MG/1; MG/1; MG/1; MG/1
27-0.6-0.4-3 CAPSULE, GELATIN COATED ORAL
Qty: 1 | Refills: 11 | Status: ACTIVE | COMMUNITY
Start: 2024-04-30 | End: 1900-01-01

## 2024-05-01 LAB
BILIRUB UR QL STRIP: NORMAL
C TRACH RRNA SPEC QL NAA+PROBE: NOT DETECTED
GLUCOSE UR-MCNC: NORMAL
HCG UR QL: 0.2 EU/DL
HCG UR QL: POSITIVE
HGB UR QL STRIP.AUTO: NORMAL
KETONES UR-MCNC: NORMAL
LEUKOCYTE ESTERASE UR QL STRIP: NORMAL
N GONORRHOEA RRNA SPEC QL NAA+PROBE: NOT DETECTED
NITRITE UR QL STRIP: NORMAL
PH UR STRIP: 5.5
PROT UR STRIP-MCNC: NORMAL
QUALITY CONTROL: YES
SOURCE AMPLIFICATION: NORMAL
SOURCE AMPLIFICATION: NORMAL
SP GR UR STRIP: 1.02
T VAGINALIS RRNA SPEC QL NAA+PROBE: NOT DETECTED

## 2024-05-03 LAB — BACTERIA UR CULT: NORMAL

## 2024-05-09 ENCOUNTER — APPOINTMENT (OUTPATIENT)
Dept: CARDIOLOGY | Facility: CLINIC | Age: 34
End: 2024-05-09
Payer: COMMERCIAL

## 2024-05-09 ENCOUNTER — NON-APPOINTMENT (OUTPATIENT)
Age: 34
End: 2024-05-09

## 2024-05-09 VITALS
SYSTOLIC BLOOD PRESSURE: 122 MMHG | HEART RATE: 110 BPM | WEIGHT: 284 LBS | TEMPERATURE: 98 F | BODY MASS INDEX: 40.66 KG/M2 | DIASTOLIC BLOOD PRESSURE: 81 MMHG | OXYGEN SATURATION: 100 % | HEIGHT: 70 IN

## 2024-05-09 PROCEDURE — 93000 ELECTROCARDIOGRAM COMPLETE: CPT

## 2024-05-09 PROCEDURE — G2211 COMPLEX E/M VISIT ADD ON: CPT | Mod: NC,1L

## 2024-05-09 PROCEDURE — 99204 OFFICE O/P NEW MOD 45 MIN: CPT | Mod: 25

## 2024-05-09 PROCEDURE — 93242 EXT ECG>48HR<7D RECORDING: CPT

## 2024-05-09 NOTE — DISCUSSION/SUMMARY
[FreeTextEntry1] : Palpitations:  - obtain echocardiogram  - event monitor x7 days for assessment of arrythmias  Cardiovascular risk stratification:  - patient will fax lipid panel and hemoglobin A1c done at PCP  [EKG obtained to assist in diagnosis and management of assessed problem(s)] : EKG obtained to assist in diagnosis and management of assessed problem(s)

## 2024-05-09 NOTE — REASON FOR VISIT
[FreeTextEntry1] : CC: Palpitations   HPI: 34F with obesity, 9-week pregnancy, no prior medical conditions presents for evaluation of palpitations.  occurs 2-3 times a day, not associate with exertion, occurs at rest no episode of syncope or lightheadedness associated with these episodes.  no family  thyroid function is normal in Ob-Gyn labs.    ROS:  chest pain (-), dyspnea with exertion (-), orthopnea (-), edema (-), palpitations (+). All other systems were reviewed and are negative.   PFSH: Patient's current cardiovascular medications were reviewed and updated in chart. PMHx as described in HPI above. No prior cardiac testing available for review No family history of premature CAD, or SCD. SH: tobacco (-), alcohol (-), recreational drugs (-)

## 2024-05-21 ENCOUNTER — APPOINTMENT (OUTPATIENT)
Dept: OBGYN | Facility: CLINIC | Age: 34
End: 2024-05-21
Payer: COMMERCIAL

## 2024-05-21 VITALS
HEIGHT: 70 IN | WEIGHT: 285 LBS | BODY MASS INDEX: 40.8 KG/M2 | SYSTOLIC BLOOD PRESSURE: 118 MMHG | DIASTOLIC BLOOD PRESSURE: 68 MMHG

## 2024-05-21 DIAGNOSIS — Z3A.10 10 WEEKS GESTATION OF PREGNANCY: ICD-10-CM

## 2024-05-21 DIAGNOSIS — O36.80X0 PREGNANCY WITH INCONCLUSIVE FETAL VIABILITY, NOT APPLICABLE OR UNSPECIFIED: ICD-10-CM

## 2024-05-21 PROCEDURE — 76801 OB US < 14 WKS SINGLE FETUS: CPT | Mod: 59

## 2024-05-21 PROCEDURE — 36415 COLL VENOUS BLD VENIPUNCTURE: CPT

## 2024-05-21 PROCEDURE — 0502F SUBSEQUENT PRENATAL CARE: CPT

## 2024-05-22 LAB
ABO + RH PNL BLD: NORMAL
BASOPHILS # BLD AUTO: 0.04 K/UL
BASOPHILS NFR BLD AUTO: 0.5 %
BILIRUB UR QL STRIP: NORMAL
BLD GP AB SCN SERPL QL: NORMAL
EOSINOPHIL # BLD AUTO: 0.06 K/UL
EOSINOPHIL NFR BLD AUTO: 0.7 %
GLUCOSE UR-MCNC: NORMAL
HCG UR QL: 1 EU/DL
HCT VFR BLD CALC: 37.3 %
HGB BLD-MCNC: 12.4 G/DL
HGB UR QL STRIP.AUTO: NORMAL
HIV1+2 AB SPEC QL IA.RAPID: NONREACTIVE
IMM GRANULOCYTES NFR BLD AUTO: 0.4 %
KETONES UR-MCNC: NORMAL
LEUKOCYTE ESTERASE UR QL STRIP: NORMAL
LYMPHOCYTES # BLD AUTO: 1.83 K/UL
LYMPHOCYTES NFR BLD AUTO: 22.1 %
MAN DIFF?: NORMAL
MCHC RBC-ENTMCNC: 28.3 PG
MCHC RBC-ENTMCNC: 33.2 GM/DL
MCV RBC AUTO: 85.2 FL
MONOCYTES # BLD AUTO: 0.52 K/UL
MONOCYTES NFR BLD AUTO: 6.3 %
NEUTROPHILS # BLD AUTO: 5.81 K/UL
NEUTROPHILS NFR BLD AUTO: 70 %
NITRITE UR QL STRIP: NORMAL
PH UR STRIP: 7
PLATELET # BLD AUTO: 205 K/UL
PROT UR STRIP-MCNC: NORMAL
RBC # BLD: 4.38 M/UL
RBC # FLD: 13.9 %
SP GR UR STRIP: 1.02
T PALLIDUM AB SER QL IA: NEGATIVE
TSH SERPL-ACNC: 0.97 UIU/ML
WBC # FLD AUTO: 8.29 K/UL

## 2024-05-24 ENCOUNTER — NON-APPOINTMENT (OUTPATIENT)
Age: 34
End: 2024-05-24

## 2024-05-24 LAB
B19V IGG SER QL IA: 4.46 INDEX
B19V IGG+IGM SER-IMP: NORMAL
B19V IGG+IGM SER-IMP: POSITIVE
B19V IGM FLD-ACNC: 0.27 INDEX
B19V IGM SER-ACNC: NEGATIVE
HBV SURFACE AG SERPL QL IA: NONREACTIVE
HCV AB SER QL: NONREACTIVE
HCV S/CO RATIO: 0.09 S/CO
HGB A MFR BLD: 97.3 %
HGB A2 MFR BLD: 2.7 %
HGB FRACT BLD-IMP: NORMAL
LEAD BLD-MCNC: <1 UG/DL
MEV IGG FLD QL IA: 14 AU/ML
MEV IGG+IGM SER-IMP: NORMAL
RUBV IGG FLD-ACNC: 0.7 INDEX
RUBV IGG SER-IMP: NEGATIVE
VZV AB TITR SER: POSITIVE
VZV IGG SER IF-ACNC: 769.5 INDEX

## 2024-05-28 ENCOUNTER — NON-APPOINTMENT (OUTPATIENT)
Age: 34
End: 2024-05-28

## 2024-05-28 ENCOUNTER — APPOINTMENT (OUTPATIENT)
Dept: CARDIOLOGY | Facility: CLINIC | Age: 34
End: 2024-05-28
Payer: COMMERCIAL

## 2024-05-28 PROCEDURE — 93306 TTE W/DOPPLER COMPLETE: CPT

## 2024-06-04 ENCOUNTER — NON-APPOINTMENT (OUTPATIENT)
Age: 34
End: 2024-06-04

## 2024-06-04 ENCOUNTER — APPOINTMENT (OUTPATIENT)
Dept: OBGYN | Facility: CLINIC | Age: 34
End: 2024-06-04
Payer: COMMERCIAL

## 2024-06-04 VITALS
HEIGHT: 70 IN | BODY MASS INDEX: 40.09 KG/M2 | WEIGHT: 280 LBS | DIASTOLIC BLOOD PRESSURE: 64 MMHG | SYSTOLIC BLOOD PRESSURE: 110 MMHG

## 2024-06-04 DIAGNOSIS — T75.3XXA MOTION SICKNESS, INITIAL ENCOUNTER: ICD-10-CM

## 2024-06-04 DIAGNOSIS — Z3A.12 12 WEEKS GESTATION OF PREGNANCY: ICD-10-CM

## 2024-06-04 DIAGNOSIS — Z32.01 ENCOUNTER FOR PREGNANCY TEST, RESULT POSITIVE: ICD-10-CM

## 2024-06-04 PROCEDURE — 36415 COLL VENOUS BLD VENIPUNCTURE: CPT

## 2024-06-04 PROCEDURE — 0502F SUBSEQUENT PRENATAL CARE: CPT

## 2024-06-04 RX ORDER — ONDANSETRON 8 MG/1
8 TABLET, ORALLY DISINTEGRATING ORAL EVERY 8 HOURS
Qty: 42 | Refills: 0 | Status: ACTIVE | COMMUNITY
Start: 2024-06-04 | End: 1900-01-01

## 2024-06-05 LAB
BILIRUB UR QL STRIP: NORMAL
GLUCOSE UR-MCNC: NORMAL
HCG UR QL: 0.2 EU/DL
HGB UR QL STRIP.AUTO: NORMAL
KETONES UR-MCNC: 15
LEUKOCYTE ESTERASE UR QL STRIP: NORMAL
NITRITE UR QL STRIP: NORMAL
PH UR STRIP: 6
PROT UR STRIP-MCNC: NORMAL
SP GR UR STRIP: 1.02

## 2024-06-10 ENCOUNTER — NON-APPOINTMENT (OUTPATIENT)
Age: 34
End: 2024-06-10

## 2024-06-10 ENCOUNTER — APPOINTMENT (OUTPATIENT)
Dept: CARDIOLOGY | Facility: CLINIC | Age: 34
End: 2024-06-10
Payer: COMMERCIAL

## 2024-06-10 VITALS
BODY MASS INDEX: 39.8 KG/M2 | HEART RATE: 96 BPM | DIASTOLIC BLOOD PRESSURE: 78 MMHG | OXYGEN SATURATION: 100 % | WEIGHT: 278 LBS | SYSTOLIC BLOOD PRESSURE: 115 MMHG | TEMPERATURE: 98.2 F | HEIGHT: 70 IN

## 2024-06-10 DIAGNOSIS — R00.2 PALPITATIONS: ICD-10-CM

## 2024-06-10 PROCEDURE — 99214 OFFICE O/P EST MOD 30 MIN: CPT | Mod: 25

## 2024-06-10 PROCEDURE — 93000 ELECTROCARDIOGRAM COMPLETE: CPT

## 2024-06-10 PROCEDURE — G2211 COMPLEX E/M VISIT ADD ON: CPT | Mod: NC,1L

## 2024-06-10 NOTE — DISCUSSION/SUMMARY
[FreeTextEntry1] : Palpitations: resolved.  -  ECG shows normal sinus rhythm. No ST elevations or depressions are noted. -  Echocardiogram shows low normal left ventricular systolic function and no significant valvular abnormalities. -  Follow up event monitor x7 days for assessment of arrythmias, report pending.   Cardiovascular risk stratification:  - patient will check lipid panel and hemoglobin A1c with PCP  Billing Statement: ECG obtained in the diagnosis and treatment of assessed problems. Total time spent on this encounter was 30 minutes. This includes non-face-to-face reviewing relevant portions of the patient's medical records prior to visit as well as time spent in completing documentation and coordination of care. During face-to-face time, I obtained medical history, examined the patient and discussed cardiovascular assessment and plan. [EKG obtained to assist in diagnosis and management of assessed problem(s)] : EKG obtained to assist in diagnosis and management of assessed problem(s)

## 2024-06-10 NOTE — REASON FOR VISIT
[FreeTextEntry1] : CC: Palpitations   HPI: 34F with obesity, 9-week pregnancy, no prior medical conditions present for evaluation of palpitations.  occurs 2-3 times a day, not associate with exertion, occurs at rest no episode of syncope or lightheadedness associated with these episodes.  no family  thyroid function is normal in Ob-Gyn labs.  palpitation has resolved.  Event monitor was sent in pending report  Palpitations resolved Echocardiogram: technically difficult, low-normal EF 50-55%, and no significant valvular disease noted.  ROS:  chest pain (-), dyspnea with exertion (-), orthopnea (-), edema (-), palpitations (+). All other systems were reviewed and are negative.   PFSH: Patient's current cardiovascular medications were reviewed and updated in chart. PMHx as described in HPI above. No prior cardiac testing available for review No family history of premature CAD, or SCD. SH: tobacco (-), alcohol (-), recreational drugs (-)

## 2024-06-11 ENCOUNTER — NON-APPOINTMENT (OUTPATIENT)
Age: 34
End: 2024-06-11

## 2024-06-11 PROCEDURE — 93244 EXT ECG>48HR<7D REV&INTERPJ: CPT

## 2024-06-14 ENCOUNTER — RESULT REVIEW (OUTPATIENT)
Age: 34
End: 2024-06-14

## 2024-06-14 ENCOUNTER — APPOINTMENT (OUTPATIENT)
Dept: ULTRASOUND IMAGING | Facility: CLINIC | Age: 34
End: 2024-06-14

## 2024-06-14 ENCOUNTER — APPOINTMENT (OUTPATIENT)
Dept: MAMMOGRAPHY | Facility: CLINIC | Age: 34
End: 2024-06-14

## 2024-06-14 PROCEDURE — 76641 ULTRASOUND BREAST COMPLETE: CPT | Mod: 50

## 2024-07-02 ENCOUNTER — APPOINTMENT (OUTPATIENT)
Dept: OBGYN | Facility: CLINIC | Age: 34
End: 2024-07-02
Payer: COMMERCIAL

## 2024-07-02 VITALS
BODY MASS INDEX: 40.37 KG/M2 | DIASTOLIC BLOOD PRESSURE: 72 MMHG | SYSTOLIC BLOOD PRESSURE: 120 MMHG | WEIGHT: 282 LBS | HEIGHT: 70 IN

## 2024-07-02 DIAGNOSIS — Z3A.16 16 WEEKS GESTATION OF PREGNANCY: ICD-10-CM

## 2024-07-02 DIAGNOSIS — O99.213 OBESITY COMPLICATING PREGNANCY, THIRD TRIMESTER: ICD-10-CM

## 2024-07-02 DIAGNOSIS — E66.01 OBESITY COMPLICATING PREGNANCY, THIRD TRIMESTER: ICD-10-CM

## 2024-07-02 PROCEDURE — 36415 COLL VENOUS BLD VENIPUNCTURE: CPT

## 2024-07-02 PROCEDURE — 0502F SUBSEQUENT PRENATAL CARE: CPT

## 2024-07-05 LAB
AF-AFP DISCLAIMER: NORMAL
AF-AFP MOM: 1.26
AFP CONCENTRATION: 29.44 NG/ML
AFP INTERPRETATION: NORMAL
AFP MOM CUT-OFF: 2.5
AFP PERCENTILE: 76.2
AFP SCREENING RESULT: NORMAL
AFTER SCREENING RISK OPEN SPINA BIFIDA: NORMAL
BEFORE SCREENING RISK OPEN SPINA BIFIDA: NORMAL
BILIRUB UR QL STRIP: NORMAL
EXTREME ANALYTE ALERT: NO
GESTATIONAL  AGE: NORMAL
GLUCOSE UR-MCNC: NORMAL
HCG UR QL: 0.2 EU/DL
HGB UR QL STRIP.AUTO: NORMAL
KETONES UR-MCNC: NORMAL
LEUKOCYTE ESTERASE UR QL STRIP: NORMAL
MATERNAL WGT: 282
NITRITE UR QL STRIP: NORMAL
PH UR STRIP: 6
PROT UR STRIP-MCNC: NORMAL
RACE/ETHNICITY: NORMAL
SP GR UR STRIP: 1.03

## 2024-07-30 ENCOUNTER — APPOINTMENT (OUTPATIENT)
Dept: OBGYN | Facility: CLINIC | Age: 34
End: 2024-07-30
Payer: COMMERCIAL

## 2024-07-30 VITALS
WEIGHT: 282 LBS | BODY MASS INDEX: 40.37 KG/M2 | HEIGHT: 70 IN | DIASTOLIC BLOOD PRESSURE: 70 MMHG | SYSTOLIC BLOOD PRESSURE: 118 MMHG

## 2024-07-30 DIAGNOSIS — O99.213 OBESITY COMPLICATING PREGNANCY, THIRD TRIMESTER: ICD-10-CM

## 2024-07-30 DIAGNOSIS — Z3A.20 20 WEEKS GESTATION OF PREGNANCY: ICD-10-CM

## 2024-07-30 DIAGNOSIS — E66.01 OBESITY COMPLICATING PREGNANCY, THIRD TRIMESTER: ICD-10-CM

## 2024-07-30 DIAGNOSIS — K21.9 GASTRO-ESOPHAGEAL REFLUX DISEASE W/OUT ESOPHAGITIS: ICD-10-CM

## 2024-07-30 PROCEDURE — 0502F SUBSEQUENT PRENATAL CARE: CPT

## 2024-07-30 PROCEDURE — 59425 ANTEPARTUM CARE ONLY: CPT

## 2024-07-30 RX ORDER — PANTOPRAZOLE 40 MG/1
40 TABLET, DELAYED RELEASE ORAL DAILY
Qty: 1 | Refills: 0 | Status: ACTIVE | COMMUNITY
Start: 2024-07-30 | End: 1900-01-01

## 2024-08-02 ENCOUNTER — ASOB RESULT (OUTPATIENT)
Age: 34
End: 2024-08-02

## 2024-08-02 ENCOUNTER — APPOINTMENT (OUTPATIENT)
Dept: ANTEPARTUM | Facility: CLINIC | Age: 34
End: 2024-08-02

## 2024-08-02 PROCEDURE — 76817 TRANSVAGINAL US OBSTETRIC: CPT

## 2024-08-02 PROCEDURE — 76811 OB US DETAILED SNGL FETUS: CPT | Mod: 59

## 2024-08-04 PROBLEM — O44.40 LOW LYING PLACENTA, ANTEPARTUM: Status: ACTIVE | Noted: 2024-08-04

## 2024-08-05 ENCOUNTER — NON-APPOINTMENT (OUTPATIENT)
Age: 34
End: 2024-08-05

## 2024-08-09 ENCOUNTER — ASOB RESULT (OUTPATIENT)
Age: 34
End: 2024-08-09

## 2024-08-09 ENCOUNTER — APPOINTMENT (OUTPATIENT)
Dept: ANTEPARTUM | Facility: CLINIC | Age: 34
End: 2024-08-09

## 2024-08-09 PROCEDURE — 76816 OB US FOLLOW-UP PER FETUS: CPT

## 2024-08-27 NOTE — ED ADULT NURSE NOTE - NSFALLRSKINDICATORS_ED_ALL_ED
Reviewed with cardiologist.   Have patient get a urine metanephrine lab test.   Discontinue carvedilol  Start labetalol 100 mg TID   After one week on labetalol have patient wear a 24 hour blood pressure monitor.  no

## 2024-09-03 ENCOUNTER — APPOINTMENT (OUTPATIENT)
Dept: OBGYN | Facility: CLINIC | Age: 34
End: 2024-09-03
Payer: MEDICAID

## 2024-09-03 VITALS
HEIGHT: 70 IN | BODY MASS INDEX: 40.8 KG/M2 | SYSTOLIC BLOOD PRESSURE: 122 MMHG | WEIGHT: 285 LBS | DIASTOLIC BLOOD PRESSURE: 70 MMHG

## 2024-09-03 DIAGNOSIS — O09.529 SUPERVISION OF ELDERLY MULTIGRAVIDA, UNSPECIFIED TRIMESTER: ICD-10-CM

## 2024-09-03 DIAGNOSIS — Z13.1 ENCOUNTER FOR SCREENING FOR DIABETES MELLITUS: ICD-10-CM

## 2024-09-03 DIAGNOSIS — O34.219 MATERNAL CARE FOR UNSPECIFIED TYPE SCAR FROM PREVIOUS CESAREAN DELIVERY: ICD-10-CM

## 2024-09-03 DIAGNOSIS — O44.40 LOW LYING PLACENTA NOS OR WITHOUT HEMORRHAGE, UNSPECIFIED TRIMESTER: ICD-10-CM

## 2024-09-03 DIAGNOSIS — Z3A.25 25 WEEKS GESTATION OF PREGNANCY: ICD-10-CM

## 2024-09-03 PROCEDURE — 36415 COLL VENOUS BLD VENIPUNCTURE: CPT

## 2024-09-03 PROCEDURE — 59425 ANTEPARTUM CARE ONLY: CPT

## 2024-09-03 PROCEDURE — 0502F SUBSEQUENT PRENATAL CARE: CPT

## 2024-09-04 ENCOUNTER — NON-APPOINTMENT (OUTPATIENT)
Age: 34
End: 2024-09-04

## 2024-09-04 LAB
BASOPHILS # BLD AUTO: 0.02 K/UL
BASOPHILS NFR BLD AUTO: 0.2 %
EOSINOPHIL # BLD AUTO: 0.06 K/UL
EOSINOPHIL NFR BLD AUTO: 0.6 %
GLUCOSE 1H P 50 G GLC PO SERPL-MCNC: 107 MG/DL
HCT VFR BLD CALC: 36 %
HGB BLD-MCNC: 11.8 G/DL
IMM GRANULOCYTES NFR BLD AUTO: 0.6 %
LYMPHOCYTES # BLD AUTO: 1.65 K/UL
LYMPHOCYTES NFR BLD AUTO: 17.6 %
MAN DIFF?: NORMAL
MCHC RBC-ENTMCNC: 28.9 PG
MCHC RBC-ENTMCNC: 32.8 GM/DL
MCV RBC AUTO: 88 FL
MONOCYTES # BLD AUTO: 0.52 K/UL
MONOCYTES NFR BLD AUTO: 5.5 %
NEUTROPHILS # BLD AUTO: 7.07 K/UL
NEUTROPHILS NFR BLD AUTO: 75.5 %
PLATELET # BLD AUTO: 170 K/UL
RBC # BLD: 4.09 M/UL
RBC # FLD: 14.7 %
WBC # FLD AUTO: 9.38 K/UL

## 2024-09-16 ENCOUNTER — NON-APPOINTMENT (OUTPATIENT)
Age: 34
End: 2024-09-16

## 2024-09-17 ENCOUNTER — APPOINTMENT (OUTPATIENT)
Dept: OBGYN | Facility: CLINIC | Age: 34
End: 2024-09-17
Payer: MEDICAID

## 2024-09-17 VITALS
SYSTOLIC BLOOD PRESSURE: 118 MMHG | DIASTOLIC BLOOD PRESSURE: 80 MMHG | BODY MASS INDEX: 40.52 KG/M2 | HEIGHT: 70 IN | WEIGHT: 283 LBS

## 2024-09-17 DIAGNOSIS — Z3A.27 27 WEEKS GESTATION OF PREGNANCY: ICD-10-CM

## 2024-09-17 DIAGNOSIS — B37.9 CANDIDIASIS, UNSPECIFIED: ICD-10-CM

## 2024-09-17 PROCEDURE — 0502F SUBSEQUENT PRENATAL CARE: CPT

## 2024-09-17 PROCEDURE — 59426 ANTEPARTUM CARE ONLY: CPT

## 2024-09-17 PROCEDURE — 99212 OFFICE O/P EST SF 10 MIN: CPT | Mod: 25

## 2024-09-17 RX ORDER — NYSTATIN 100000 [USP'U]/G
100000 CREAM TOPICAL TWICE DAILY
Qty: 1 | Refills: 0 | Status: ACTIVE | COMMUNITY
Start: 2024-09-17 | End: 1900-01-01

## 2024-09-18 PROBLEM — Z3A.27 27 WEEKS GESTATION OF PREGNANCY: Status: ACTIVE | Noted: 2024-09-17

## 2024-09-20 ENCOUNTER — ASOB RESULT (OUTPATIENT)
Age: 34
End: 2024-09-20

## 2024-09-20 ENCOUNTER — APPOINTMENT (OUTPATIENT)
Dept: ANTEPARTUM | Facility: CLINIC | Age: 34
End: 2024-09-20
Payer: MEDICAID

## 2024-09-20 PROCEDURE — 76817 TRANSVAGINAL US OBSTETRIC: CPT

## 2024-09-20 PROCEDURE — 76819 FETAL BIOPHYS PROFIL W/O NST: CPT | Mod: 59

## 2024-09-20 PROCEDURE — 76816 OB US FOLLOW-UP PER FETUS: CPT

## 2024-09-28 NOTE — OB PROVIDER TRIAGE NOTE - NSPRIMARYCAREPROV_OBGYN_ALL_OB
ICC Provider Note    Patient : Bianka Kramer Age: 48 Brenda Gomez, CNP  9/28/2024           MD//ASHLEY/RAFAT

## 2024-10-04 ENCOUNTER — NON-APPOINTMENT (OUTPATIENT)
Age: 34
End: 2024-10-04

## 2024-10-06 ENCOUNTER — OUTPATIENT (OUTPATIENT)
Dept: INPATIENT UNIT | Facility: HOSPITAL | Age: 34
LOS: 1 days | Discharge: HOME | End: 2024-10-06
Payer: MEDICAID

## 2024-10-06 VITALS — HEART RATE: 96 BPM | DIASTOLIC BLOOD PRESSURE: 68 MMHG | SYSTOLIC BLOOD PRESSURE: 111 MMHG

## 2024-10-06 VITALS
SYSTOLIC BLOOD PRESSURE: 138 MMHG | DIASTOLIC BLOOD PRESSURE: 47 MMHG | TEMPERATURE: 98 F | HEART RATE: 126 BPM | RESPIRATION RATE: 16 BRPM

## 2024-10-06 DIAGNOSIS — Z98.891 HISTORY OF UTERINE SCAR FROM PREVIOUS SURGERY: Chronic | ICD-10-CM

## 2024-10-06 DIAGNOSIS — O26.899 OTHER SPECIFIED PREGNANCY RELATED CONDITIONS, UNSPECIFIED TRIMESTER: ICD-10-CM

## 2024-10-06 DIAGNOSIS — K08.409 PARTIAL LOSS OF TEETH, UNSPECIFIED CAUSE, UNSPECIFIED CLASS: Chronic | ICD-10-CM

## 2024-10-06 LAB
ALBUMIN SERPL ELPH-MCNC: 3.8 G/DL — SIGNIFICANT CHANGE UP (ref 3.3–5)
ALP SERPL-CCNC: 70 U/L — SIGNIFICANT CHANGE UP (ref 40–120)
ALT FLD-CCNC: 10 U/L — SIGNIFICANT CHANGE UP (ref 4–33)
ANION GAP SERPL CALC-SCNC: 13 MMOL/L — SIGNIFICANT CHANGE UP (ref 7–14)
APPEARANCE UR: ABNORMAL
AST SERPL-CCNC: 15 U/L — SIGNIFICANT CHANGE UP (ref 4–32)
BACTERIA # UR AUTO: NEGATIVE /HPF — SIGNIFICANT CHANGE UP
BASOPHILS # BLD AUTO: 0.03 K/UL — SIGNIFICANT CHANGE UP (ref 0–0.2)
BASOPHILS NFR BLD AUTO: 0.3 % — SIGNIFICANT CHANGE UP (ref 0–2)
BILIRUB SERPL-MCNC: 0.4 MG/DL — SIGNIFICANT CHANGE UP (ref 0.2–1.2)
BILIRUB UR-MCNC: NEGATIVE — SIGNIFICANT CHANGE UP
BUN SERPL-MCNC: 10 MG/DL — SIGNIFICANT CHANGE UP (ref 7–23)
CALCIUM SERPL-MCNC: 9.6 MG/DL — SIGNIFICANT CHANGE UP (ref 8.4–10.5)
CAST: 0 /LPF — SIGNIFICANT CHANGE UP (ref 0–4)
CHLORIDE SERPL-SCNC: 103 MMOL/L — SIGNIFICANT CHANGE UP (ref 98–107)
CO2 SERPL-SCNC: 20 MMOL/L — LOW (ref 22–31)
COLOR SPEC: YELLOW — SIGNIFICANT CHANGE UP
CREAT ?TM UR-MCNC: 174 MG/DL — SIGNIFICANT CHANGE UP
CREAT SERPL-MCNC: 0.37 MG/DL — LOW (ref 0.5–1.3)
DIFF PNL FLD: NEGATIVE — SIGNIFICANT CHANGE UP
EGFR: 135 ML/MIN/1.73M2 — SIGNIFICANT CHANGE UP
EOSINOPHIL # BLD AUTO: 0.04 K/UL — SIGNIFICANT CHANGE UP (ref 0–0.5)
EOSINOPHIL NFR BLD AUTO: 0.4 % — SIGNIFICANT CHANGE UP (ref 0–6)
GLUCOSE SERPL-MCNC: 95 MG/DL — SIGNIFICANT CHANGE UP (ref 70–99)
GLUCOSE UR QL: NEGATIVE MG/DL — SIGNIFICANT CHANGE UP
HCT VFR BLD CALC: 38 % — SIGNIFICANT CHANGE UP (ref 34.5–45)
HGB BLD-MCNC: 12.7 G/DL — SIGNIFICANT CHANGE UP (ref 11.5–15.5)
IANC: 7.87 K/UL — HIGH (ref 1.8–7.4)
IMM GRANULOCYTES NFR BLD AUTO: 0.4 % — SIGNIFICANT CHANGE UP (ref 0–0.9)
KETONES UR-MCNC: NEGATIVE MG/DL — SIGNIFICANT CHANGE UP
LDH SERPL L TO P-CCNC: 198 U/L — SIGNIFICANT CHANGE UP (ref 135–225)
LEUKOCYTE ESTERASE UR-ACNC: ABNORMAL
LYMPHOCYTES # BLD AUTO: 1.92 K/UL — SIGNIFICANT CHANGE UP (ref 1–3.3)
LYMPHOCYTES # BLD AUTO: 18.2 % — SIGNIFICANT CHANGE UP (ref 13–44)
MCHC RBC-ENTMCNC: 28.9 PG — SIGNIFICANT CHANGE UP (ref 27–34)
MCHC RBC-ENTMCNC: 33.4 GM/DL — SIGNIFICANT CHANGE UP (ref 32–36)
MCV RBC AUTO: 86.4 FL — SIGNIFICANT CHANGE UP (ref 80–100)
MONOCYTES # BLD AUTO: 0.65 K/UL — SIGNIFICANT CHANGE UP (ref 0–0.9)
MONOCYTES NFR BLD AUTO: 6.2 % — SIGNIFICANT CHANGE UP (ref 2–14)
NEUTROPHILS # BLD AUTO: 7.87 K/UL — HIGH (ref 1.8–7.4)
NEUTROPHILS NFR BLD AUTO: 74.5 % — SIGNIFICANT CHANGE UP (ref 43–77)
NITRITE UR-MCNC: NEGATIVE — SIGNIFICANT CHANGE UP
NRBC # BLD: 0 /100 WBCS — SIGNIFICANT CHANGE UP (ref 0–0)
NRBC # FLD: 0 K/UL — SIGNIFICANT CHANGE UP (ref 0–0)
PH UR: 6 — SIGNIFICANT CHANGE UP (ref 5–8)
PLATELET # BLD AUTO: 208 K/UL — SIGNIFICANT CHANGE UP (ref 150–400)
POTASSIUM SERPL-MCNC: 3.9 MMOL/L — SIGNIFICANT CHANGE UP (ref 3.5–5.3)
POTASSIUM SERPL-SCNC: 3.9 MMOL/L — SIGNIFICANT CHANGE UP (ref 3.5–5.3)
PROT ?TM UR-MCNC: 15 MG/DL — SIGNIFICANT CHANGE UP
PROT SERPL-MCNC: 6.9 G/DL — SIGNIFICANT CHANGE UP (ref 6–8.3)
PROT UR-MCNC: SIGNIFICANT CHANGE UP MG/DL
PROT/CREAT UR-RTO: 0.1 RATIO — SIGNIFICANT CHANGE UP (ref 0–0.2)
RBC # BLD: 4.4 M/UL — SIGNIFICANT CHANGE UP (ref 3.8–5.2)
RBC # FLD: 14.5 % — SIGNIFICANT CHANGE UP (ref 10.3–14.5)
RBC CASTS # UR COMP ASSIST: 3 /HPF — SIGNIFICANT CHANGE UP (ref 0–4)
REVIEW: SIGNIFICANT CHANGE UP
SODIUM SERPL-SCNC: 136 MMOL/L — SIGNIFICANT CHANGE UP (ref 135–145)
SP GR SPEC: 1.02 — SIGNIFICANT CHANGE UP (ref 1–1.03)
SQUAMOUS # UR AUTO: 6 /HPF — HIGH (ref 0–5)
URATE SERPL-MCNC: 3.9 MG/DL — SIGNIFICANT CHANGE UP (ref 2.5–7)
UROBILINOGEN FLD QL: 1 MG/DL — SIGNIFICANT CHANGE UP (ref 0.2–1)
WBC # BLD: 10.55 K/UL — HIGH (ref 3.8–10.5)
WBC # FLD AUTO: 10.55 K/UL — HIGH (ref 3.8–10.5)
WBC UR QL: 2 /HPF — SIGNIFICANT CHANGE UP (ref 0–5)

## 2024-10-06 PROCEDURE — 99221 1ST HOSP IP/OBS SF/LOW 40: CPT | Mod: 25

## 2024-10-06 PROCEDURE — 93010 ELECTROCARDIOGRAM REPORT: CPT

## 2024-10-06 PROCEDURE — 59025 FETAL NON-STRESS TEST: CPT | Mod: 26

## 2024-10-06 RX ORDER — SODIUM CHLORIDE IRRIG SOLUTION 0.9 %
1000 SOLUTION, IRRIGATION IRRIGATION ONCE
Refills: 0 | Status: COMPLETED | OUTPATIENT
Start: 2024-10-06 | End: 2024-10-06

## 2024-10-06 RX ORDER — PRENATAL VIT,CAL 76/IRON/FOLIC 29 MG-1 MG
1 TABLET ORAL
Refills: 0 | DISCHARGE

## 2024-10-06 RX ADMIN — Medication 1000 MILLILITER(S): at 16:37

## 2024-10-06 NOTE — OB PROVIDER TRIAGE NOTE - HISTORY OF PRESENT ILLNESS
33yo female  @ 30.4 wks SLIUP here complaining of feeling lightheaded and when she took her BP with a wrist BP cuff her pulse was high in the 150's and her BP was high at 157/98. Pt deneis HA/ RUQ or epigastric pain/ scotoma. Pt denies having any BP issues with this or previous pregnancies and just took her BP because her father has a cuff and she was feeling lightheaded. Pt reports GFM and denies VB/LOF/ctx's. Pt reports having palpitations when she was approx 9 weeks pregnancy and saw Dr Andrei Fair of cariology where EKG and echo were normal.     PNC complicated by palpitations, cleared by cardio at 9 weeks

## 2024-10-06 NOTE — OB PROVIDER TRIAGE NOTE - NSOBPROVIDERNOTE_OBGYN_ALL_OB_FT
35yo female  @ 30.4 wks SLIUP here for lightheadedness and high BP/tachycardia  -EKG read by eulogio and is sinus tachycardia otherwise normal  -Pulse coming down to normal with IV hydration  -HELLP labs sent 35yo female  @ 30.4 wks SLIUP here for lightheadedness and high BP/tachycardia  -EKG read by eulogio and is sinus tachycardia otherwise normal  -Pulse coming down to normal with IV hydration  -HELLP labs sent  ----------------------  18:02-NST and BPP are 10/10  -HELLP labs are normal  -BP's are normotensive and pulse has come down to   -EKG read as sinus tachy otherwise normal  -pt was discussed with Dr Calderón  -pt was cleared for discharged home with instructions to PO hydrate  -pt to keep her next scheduled appt with Dr De La Cruz for this week  -pt was discharged home at 18:10 with instructions  - Please follow up with your obstetrician at your next scheduled appointment.   - Please return for decreased/no fetal movement, vaginal bleeding similar to that of a period, leaking/gush of fluid, regular contractions or requiring pain medication   - Patient and partner and educated of plan and demonstrate understanding. All questions answered. Discharge instructions provided and signed.

## 2024-10-06 NOTE — OB PROVIDER TRIAGE NOTE - NSHPPHYSICALEXAM_GEN_ALL_CORE
ICU Vital Signs Last 24 Hrs  T(C): 36.9 (06 Oct 2024 16:28), Max: 36.9 (06 Oct 2024 15:49)  T(F): 98.42 (06 Oct 2024 16:28), Max: 98.42 (06 Oct 2024 16:28)  HR: 99 (06 Oct 2024 17:07) (99 - 126)  BP: 120/67 (06 Oct 2024 17:07) (120/67 - 138/47)  BP(mean): --  ABP: --  ABP(mean): --  RR: 16 (06 Oct 2024 16:28) (16 - 16)  SpO2: --    NST reactive with 145 baseline with accels and mod variability; no ctx's  EKG was sent to eulogio Ashby MD and was read as sinus tachycardia otherwise normal ICU Vital Signs Last 24 Hrs  T(C): 36.9 (06 Oct 2024 16:28), Max: 36.9 (06 Oct 2024 15:49)  T(F): 98.42 (06 Oct 2024 16:28), Max: 98.42 (06 Oct 2024 16:28)  HR: 96 (06 Oct 2024 17:47) (96 - 126)  BP: 111/68 (06 Oct 2024 17:47) (111/68 - 138/47)  BP(mean): --  ABP: --  ABP(mean): --  RR: 16 (06 Oct 2024 16:28) (16 - 16)  SpO2: --    NST reactive with 145 baseline with accels and mod variability; no ctx's  EKG was sent to eulogio Ashby MD and was read as sinus tachycardia otherwise normal  Abd sono- Images saved to sono- complete breech; BERT-18.67; 8/8 BPP; anterior placenta

## 2024-10-06 NOTE — OB PROVIDER TRIAGE NOTE - NSHPLABSRESULTS_GEN_ALL_CORE
136 I 103 I 10  ------------------<95    AST-15; ALT-10; uric acid-3.9; LDH-198  3.9 I 20 I 0.37    Urinalysis Basic - ( 06 Oct 2024 16:58 )    Color: Yellow / Appearance: Cloudy / S.024 / pH: x  Gluc: 95 mg/dL / Ketone: Negative mg/dL  / Bili: Negative / Urobili: 1.0 mg/dL   Blood: x / Protein: Trace mg/dL / Nitrite: Negative   Leuk Esterase: Trace / RBC: 3 /HPF / WBC 2 /HPF   Sq Epi: x / Non Sq Epi: 6 /HPF / Bacteria: Negative /HPF                 12.7  10.55>-----------<208               38.0 136 I 103 I 10  ------------------<95    AST-15; ALT-10; uric acid-3.9; LDH-198  3.9 I 20 I 0.37    Urinalysis Basic - ( 06 Oct 2024 16:58 )    Color: Yellow / Appearance: Cloudy / S.024 / pH: x  Gluc: 95 mg/dL / Ketone: Negative mg/dL  / Bili: Negative / Urobili: 1.0 mg/dL   Blood: x / Protein: Trace mg/dL / Nitrite: Negative   Leuk Esterase: Trace / RBC: 3 /HPF / WBC 2 /HPF   Sq Epi: x / Non Sq Epi: 6 /HPF / Bacteria: Negative /HPF                 12.7  10.55>-----------<208               38.0    PCR-0.1

## 2024-10-06 NOTE — OB PROVIDER TRIAGE NOTE - NS_ABDFINDING_OBGYN_ALL_OB
" Discharge Form    Date of Delivery: 2021 ; Time of Delivery: 4:54 PM  Delivery Type: Vaginal, Spontaneous    Apgars:        APGARS  One minute Five minutes   Skin color: 1   1     Heart rate: 2   2     Grimace: 2   2     Muscle tone: 2   2     Breathin   2     Totals: 9   9         Formula Feeding Review (last day)     Date/Time   Formula ara/oz   Formula - P.O. (mL) [facility]       10/01/21 07   20 Kcal   5 mL EG     10/01/21 0330   20 Kcal   8 mL CR     10/01/21 0030   20 Kcal   8 mL CR     21 2130   20 Kcal   8 mL CR     21 1515   20 Kcal   4 mL EG     21 1200   20 Kcal   8 mL EG             Breastfeeding Review (last day)     Date/Time   Breastfeeding Time, Left (min)   Breastfeeding Time, Right (min) [facility]       10/01/21 07   --   10 EG     10/01/21 0330   20   20 CR     10/01/21 0030   15   20 CR     21 2130   10   10 CR     21 1730   --   5 EG     21 1515   --   45 EG     21 1200   20   -- EG     21 0800   15   25 EG     21 0430   --   10 KB     21 0300   30   -- KB     21 0230   15   -- KB     21 0000   --   30 KB               Intake & Output (last day)        0701 - 10/01 0700 10/01 0701 - 10/02 0700    P.O. 41     Total Intake(mL/kg) 41 (15.11)     Net +41           Urine Unmeasured Occurrence 4 x     Stool Unmeasured Occurrence 4 x           Birth Weight  2850 g (6 lb 4.5 oz) 2021  Discharge weight   2714 g  -5%    Discharge Exam:   Pulse 150   Temp 98.7 °F (37.1 °C) (Axillary)   Resp (!) 64   Ht 50 cm (19.69\")   Wt 2714 g (5 lb 15.7 oz)   HC 13\" (33 cm)   BMI 10.86 kg/m²   Length (cm): 50 cm   Head Circumference: Head Circumference: 13\" (33 cm)    Physical Exam  General appearance Alert and vigorous. Term    Skin  No rashes or petechiae.   HEENT: AFSF.  RACHEL. Positive RR bilaterally. Palate intact.      Normal ears.  No ear pits/tags.   Thorax  Normal and symmetrical   Lungs Clear to auscultation " bilaterally, No distress.   Heart  Normal rate and rhythm.  No murmur.  Peripheral pulses strong and equal in all 4 extremities.   Abdomen + BS.  Soft, non-tender. No mass/HSM   Genitalia  normal female exam   Anus Anus patent   Trunk and Spine Spine normal and intact.  No atypical dimpling   Extremities  Clavicles intact.  No hip clicks/clunks.   Neuro + [place], grasp, suck.  Normal Tone       Lab Results   Component Value Date    BILIDIR 0.2 2021    INDBILI 8.6 2021    BILITOT 8.8 (H) 2021     No results found.  Bill Scores (last day)     None            Assessment:  Patient Active Problem List   Diagnosis   •    • At risk for  jaundice       Nursery Course:  Unremarkable, remained in RA with stable vital signs. /bottle fed. Discharge weight is down by -5% from birth weight.    Anticipatory guidance - safe sleep , care of  and risks of passive smoking discussed with parent.     HEALTHCARE MAINTENANCE     CCHD Initial CCHD Screening  SpO2: Pre-Ductal (Right Hand): 98 % (21)  SpO2: Post-Ductal (Left or Right Foot): 98 (21)  Difference in oxygen saturation: 0 (21)   Car Seat Challenge Test     Hearing Screen Hearing Screen Date: 21 (21 1200)  Hearing Screen, Right Ear: passed (21 1200)  Hearing Screen, Left Ear: passed (21 1200)    Screen Metabolic Screen Results: in process (10/01/21 0800)   VitK and erythromycin done    Immunization History   Administered Date(s) Administered   • Hep B, Adolescent or Pediatric 2021       Plan:  Date of Discharge: 2021  Gestational Age: 37w2d , 2 days female , born via . SROM ( 24mins PTD) .  AGA, Apgar 9,9.   Mother is a 25 yo   Prenatal labs: Blood type : A+, G/C :-/- RPR/VDRL : NR ,Rubella : immune, Hep B : Negative, HIV: NR,GBS: positive ,UDS: unk  , Anatomy USG- normal     1) Admitted to nursery for routine  care  2) In RA and  ad harshal feeds. Bottle fed /Breast feeding - Lactation consultation PRN    3) Will monitor vitals and I/O  4) Vit K and erythromycin done.  5) Hyperbili risk  : Bilirubin low intermediate risk zone for age at discharge.  Infant will need follow-up bilirubin check in 24 hours.  Prescription given.  Direct component appropriate  6) Hearing screen , CCHD screen passed prior to discharge  Infant is in good condition to be discharged today follow-up with PCP in 1 to 2 days      Flor Schafer MD  2021  10:13 EDT  Please note that this discharge summary was less than 30 minutes to complete.   Soft, nontender

## 2024-10-07 ENCOUNTER — NON-APPOINTMENT (OUTPATIENT)
Age: 34
End: 2024-10-07

## 2024-10-08 ENCOUNTER — NON-APPOINTMENT (OUTPATIENT)
Age: 34
End: 2024-10-08

## 2024-10-08 ENCOUNTER — APPOINTMENT (OUTPATIENT)
Dept: OBGYN | Facility: CLINIC | Age: 34
End: 2024-10-08
Payer: MEDICAID

## 2024-10-08 VITALS
SYSTOLIC BLOOD PRESSURE: 112 MMHG | BODY MASS INDEX: 40.66 KG/M2 | DIASTOLIC BLOOD PRESSURE: 78 MMHG | HEIGHT: 70 IN | WEIGHT: 284 LBS

## 2024-10-08 DIAGNOSIS — Z3A.30 30 WEEKS GESTATION OF PREGNANCY: ICD-10-CM

## 2024-10-08 DIAGNOSIS — O09.529 SUPERVISION OF ELDERLY MULTIGRAVIDA, UNSPECIFIED TRIMESTER: ICD-10-CM

## 2024-10-08 LAB
BILIRUB UR QL STRIP: NORMAL
GLUCOSE UR-MCNC: NORMAL
HCG UR QL: 0.2 EU/DL
HGB UR QL STRIP.AUTO: NORMAL
KETONES UR-MCNC: NORMAL
LEUKOCYTE ESTERASE UR QL STRIP: NORMAL
NITRITE UR QL STRIP: NORMAL
PH UR STRIP: 6
PROT UR STRIP-MCNC: 30
SP GR UR STRIP: 1.03

## 2024-10-08 PROCEDURE — 0502F SUBSEQUENT PRENATAL CARE: CPT

## 2024-10-10 DIAGNOSIS — R00.0 TACHYCARDIA, UNSPECIFIED: ICD-10-CM

## 2024-10-10 DIAGNOSIS — R42 DIZZINESS AND GIDDINESS: ICD-10-CM

## 2024-10-10 DIAGNOSIS — O09.523 SUPERVISION OF ELDERLY MULTIGRAVIDA, THIRD TRIMESTER: ICD-10-CM

## 2024-10-10 DIAGNOSIS — O26.893 OTHER SPECIFIED PREGNANCY RELATED CONDITIONS, THIRD TRIMESTER: ICD-10-CM

## 2024-10-10 DIAGNOSIS — R03.0 ELEVATED BLOOD-PRESSURE READING, WITHOUT DIAGNOSIS OF HYPERTENSION: ICD-10-CM

## 2024-10-10 DIAGNOSIS — O34.219 MATERNAL CARE FOR UNSPECIFIED TYPE SCAR FROM PREVIOUS CESAREAN DELIVERY: ICD-10-CM

## 2024-10-10 DIAGNOSIS — Z3A.30 30 WEEKS GESTATION OF PREGNANCY: ICD-10-CM

## 2024-10-16 ENCOUNTER — NON-APPOINTMENT (OUTPATIENT)
Age: 34
End: 2024-10-16

## 2024-10-18 ENCOUNTER — APPOINTMENT (OUTPATIENT)
Dept: ANTEPARTUM | Facility: CLINIC | Age: 34
End: 2024-10-18
Payer: MEDICAID

## 2024-10-18 ENCOUNTER — ASOB RESULT (OUTPATIENT)
Age: 34
End: 2024-10-18

## 2024-10-18 PROCEDURE — 76819 FETAL BIOPHYS PROFIL W/O NST: CPT

## 2024-10-18 PROCEDURE — 76816 OB US FOLLOW-UP PER FETUS: CPT

## 2024-10-22 ENCOUNTER — APPOINTMENT (OUTPATIENT)
Dept: OBGYN | Facility: CLINIC | Age: 34
End: 2024-10-22
Payer: MEDICAID

## 2024-10-22 VITALS
WEIGHT: 291 LBS | SYSTOLIC BLOOD PRESSURE: 124 MMHG | HEIGHT: 70 IN | BODY MASS INDEX: 41.66 KG/M2 | DIASTOLIC BLOOD PRESSURE: 80 MMHG

## 2024-10-22 DIAGNOSIS — O09.529 SUPERVISION OF ELDERLY MULTIGRAVIDA, UNSPECIFIED TRIMESTER: ICD-10-CM

## 2024-10-22 DIAGNOSIS — Z3A.32 32 WEEKS GESTATION OF PREGNANCY: ICD-10-CM

## 2024-10-22 PROCEDURE — 0502F SUBSEQUENT PRENATAL CARE: CPT

## 2024-10-24 LAB
BILIRUB UR QL STRIP: NORMAL
GLUCOSE UR-MCNC: NORMAL
HCG UR QL: 0.2 EU/DL
HGB UR QL STRIP.AUTO: NORMAL
KETONES UR-MCNC: NORMAL
LEUKOCYTE ESTERASE UR QL STRIP: NORMAL
NITRITE UR QL STRIP: NORMAL
PH UR STRIP: 5.5
PROT UR STRIP-MCNC: NORMAL
SP GR UR STRIP: 1.02

## 2024-11-05 ENCOUNTER — NON-APPOINTMENT (OUTPATIENT)
Age: 34
End: 2024-11-05

## 2024-11-05 ENCOUNTER — APPOINTMENT (OUTPATIENT)
Dept: OBGYN | Facility: CLINIC | Age: 34
End: 2024-11-05

## 2024-11-05 VITALS
HEIGHT: 70 IN | BODY MASS INDEX: 41.95 KG/M2 | WEIGHT: 293 LBS | DIASTOLIC BLOOD PRESSURE: 80 MMHG | SYSTOLIC BLOOD PRESSURE: 114 MMHG

## 2024-11-05 DIAGNOSIS — Z3A.34 34 WEEKS GESTATION OF PREGNANCY: ICD-10-CM

## 2024-11-05 DIAGNOSIS — O09.529 SUPERVISION OF ELDERLY MULTIGRAVIDA, UNSPECIFIED TRIMESTER: ICD-10-CM

## 2024-11-05 DIAGNOSIS — O34.219 MATERNAL CARE FOR UNSPECIFIED TYPE SCAR FROM PREVIOUS CESAREAN DELIVERY: ICD-10-CM

## 2024-11-05 DIAGNOSIS — Z23 ENCOUNTER FOR IMMUNIZATION: ICD-10-CM

## 2024-11-05 PROCEDURE — 90656 IIV3 VACC NO PRSV 0.5 ML IM: CPT

## 2024-11-05 PROCEDURE — 90715 TDAP VACCINE 7 YRS/> IM: CPT

## 2024-11-05 PROCEDURE — G0008: CPT

## 2024-11-05 PROCEDURE — 90472 IMMUNIZATION ADMIN EACH ADD: CPT

## 2024-11-05 PROCEDURE — 0502F SUBSEQUENT PRENATAL CARE: CPT

## 2024-11-06 ENCOUNTER — APPOINTMENT (OUTPATIENT)
Dept: GASTROENTEROLOGY | Facility: CLINIC | Age: 34
End: 2024-11-06

## 2024-11-12 ENCOUNTER — OUTPATIENT (OUTPATIENT)
Dept: INPATIENT UNIT | Facility: HOSPITAL | Age: 34
LOS: 1 days | Discharge: HOME | End: 2024-11-12
Payer: MEDICAID

## 2024-11-12 ENCOUNTER — APPOINTMENT (OUTPATIENT)
Dept: ANTEPARTUM | Facility: CLINIC | Age: 34
End: 2024-11-12
Payer: MEDICAID

## 2024-11-12 ENCOUNTER — ASOB RESULT (OUTPATIENT)
Age: 34
End: 2024-11-12

## 2024-11-12 VITALS
HEART RATE: 86 BPM | DIASTOLIC BLOOD PRESSURE: 68 MMHG | SYSTOLIC BLOOD PRESSURE: 129 MMHG | TEMPERATURE: 98 F | RESPIRATION RATE: 16 BRPM

## 2024-11-12 VITALS — DIASTOLIC BLOOD PRESSURE: 88 MMHG | SYSTOLIC BLOOD PRESSURE: 138 MMHG | HEART RATE: 90 BPM

## 2024-11-12 DIAGNOSIS — Z98.891 HISTORY OF UTERINE SCAR FROM PREVIOUS SURGERY: Chronic | ICD-10-CM

## 2024-11-12 DIAGNOSIS — O26.899 OTHER SPECIFIED PREGNANCY RELATED CONDITIONS, UNSPECIFIED TRIMESTER: ICD-10-CM

## 2024-11-12 DIAGNOSIS — K08.409 PARTIAL LOSS OF TEETH, UNSPECIFIED CAUSE, UNSPECIFIED CLASS: Chronic | ICD-10-CM

## 2024-11-12 LAB
APPEARANCE UR: ABNORMAL
BACTERIA # UR AUTO: ABNORMAL /HPF
BILIRUB UR-MCNC: NEGATIVE — SIGNIFICANT CHANGE UP
CAST: 0 /LPF — SIGNIFICANT CHANGE UP (ref 0–4)
COD CRY URNS QL: PRESENT
COLOR SPEC: YELLOW — SIGNIFICANT CHANGE UP
DIFF PNL FLD: NEGATIVE — SIGNIFICANT CHANGE UP
GLUCOSE UR QL: NEGATIVE MG/DL — SIGNIFICANT CHANGE UP
KETONES UR-MCNC: ABNORMAL MG/DL
LEUKOCYTE ESTERASE UR-ACNC: NEGATIVE — SIGNIFICANT CHANGE UP
MUCOUS THREADS # UR AUTO: PRESENT
NITRITE UR-MCNC: NEGATIVE — SIGNIFICANT CHANGE UP
PH UR: 6 — SIGNIFICANT CHANGE UP (ref 5–8)
PROT UR-MCNC: 30 MG/DL
RBC CASTS # UR COMP ASSIST: 2 /HPF — SIGNIFICANT CHANGE UP (ref 0–4)
REVIEW: SIGNIFICANT CHANGE UP
SP GR SPEC: 1.04 — HIGH (ref 1–1.03)
SQUAMOUS # UR AUTO: 27 /HPF — HIGH (ref 0–5)
UROBILINOGEN FLD QL: 0.2 MG/DL — SIGNIFICANT CHANGE UP (ref 0.2–1)
WBC UR QL: 7 /HPF — HIGH (ref 0–5)

## 2024-11-12 PROCEDURE — 59025 FETAL NON-STRESS TEST: CPT | Mod: 26

## 2024-11-12 PROCEDURE — 99221 1ST HOSP IP/OBS SF/LOW 40: CPT | Mod: 25

## 2024-11-12 PROCEDURE — 76819 FETAL BIOPHYS PROFIL W/O NST: CPT | Mod: 26

## 2024-11-12 NOTE — OB PROVIDER TRIAGE NOTE - HISTORY OF PRESENT ILLNESS
35y/o  @35.6wks presents with contractions mostly on the right side since 1800, pain scale 5/10  Reports good fetal movement  Denies LOF/VB    Allergies: Denies  Medications: PNV, Pantoprazole   NPO  33y/o  @35.6wks presents with contractions mostly on her right hip and back since 1800, pain scale 5/10  Reports good fetal movement  Denies LOF/VB    Allergies: Denies  Medications: PNV, Pantoprazole   NPO

## 2024-11-12 NOTE — OB PROVIDER TRIAGE NOTE - PLAN OF CARE
D/C Home  D/W Dr. Sterling  No evidence of acute process  Normal fetal testing, bpp8/8  No evidence of  labor  Follow up at next scheduled prenatal appointment  Return for decreased fetal movement, loss of fluid or vaginal bleeding  Increase oral hydration  Signs and Symptoms of Labor reviewed   Kick Counts Reviewed

## 2024-11-12 NOTE — OB PROVIDER TRIAGE NOTE - NSHPPHYSICALEXAM_GEN_ALL_CORE
Vital Signs Last 24 Hrs  T(C): 36.8 (12 Nov 2024 01:59), Max: 36.8 (12 Nov 2024 01:17)  T(F): 98.24 (12 Nov 2024 01:59), Max: 98.24 (12 Nov 2024 01:59)  HR: 94 (12 Nov 2024 02:01) (86 - 94)  BP: 124/65 (12 Nov 2024 02:01) (124/65 - 129/68)  RR: 16 (12 Nov 2024 01:59) (16 - 16)    Assessment reveals VSS  General: Female sitting comfortably in no apparent distress  Neuro: No facial asymmetry, no slurred speech, moves all 4 extremities  Mood: Alert and lucid, appropriate mood and affect  A&Ox3  Lungs- clear bilateral  Heart- normal rate and regular rhythm  Extremities- Warm, Dry, no edema present, good pulses   Abdomen soft, NT, gravid  Cat 1 tracing reactive, ctx every 5 mins  Transabdominal Ultrasound- images saved ASOB  Sterile Speculum-  Transvaginal Ultrasound-  Vaginal Exam-       PLAN:  Urinalysis Vital Signs Last 24 Hrs  T(C): 36.8 (12 Nov 2024 01:59), Max: 36.8 (12 Nov 2024 01:17)  T(F): 98.24 (12 Nov 2024 01:59), Max: 98.24 (12 Nov 2024 01:59)  HR: 94 (12 Nov 2024 02:01) (86 - 94)  BP: 124/65 (12 Nov 2024 02:01) (124/65 - 129/68)  RR: 16 (12 Nov 2024 01:59) (16 - 16)    Assessment reveals VSS  General: Female sitting comfortably in no apparent distress  Neuro: No facial asymmetry, no slurred speech, moves all 4 extremities  Mood: Alert and lucid, appropriate mood and affect  A&Ox3  Lungs- clear bilateral  Heart- normal rate and regular rhythm  Extremities- Warm, Dry, no edema present, good pulses   Abdomen soft, NT, gravid  Cat 1 tracing reactive, ctx every 5-8mins  Transabdominal Ultrasound- images saved ASOB, vtx, post placenta, bpp8/8, adriana: 12.19  Vaginal Exam- 0.5/30/-3      PLAN:  Urinalysis

## 2024-11-12 NOTE — OB RN TRIAGE NOTE - FALL HARM RISK - FACTORS NURSING JUDGEMENT
DO Kenroy Coleman [facility]  [address], [address]  [place] [address]        Kulwinder Gan is a 76 y.o. male who presents for new evaluation and treatment of  Reason for visit:   Chief Complaint   Patient presents with    New Patient     New Patient - Adrenal Insufficiency        Kulwinder Gan is referred by Hannah Quintana MD for the evaluation and management of   E87.1 (ICD-10-CM) - Hyponatremia   E27.40 (ICD-10-CM) - Adrenal insufficiency (HCC)   .    ASSESSMENT AND PLAN:    1. 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  Additional contributing etiology could include steroids as well as opioids.  Workup included AM cortisol level <3 and ACTH level of 4.7 (x2).  The patient was appropriately started on hydrocortisone at the end of June after being seen in the emergency room for symptoms related to adrenal insufficiency.  Noted improvement in his clinical condition with starting steroids.  Continue hydrocortisone 10 mg in the a.m. upon waking and 5 mg in the afternoon at 1400.  He does not need any refills at this time.  Significant time was spent at bedside discussing the timing of his hydrocortisone dose, sick day rules, and emergency injection.  Handouts were provided regarding sick day rules and hydrocortisone injection.  Also advised to get med alert bracelet with \" adrenal insufficiency on steroids.\"  No further testing needed at this time.         Follow-up and Dispositions    Return in about 6 months (around 1/25/2025) for Secondary Adrenal Insufficiency.         History of Present Illness:  The patient has a history of CVA, HTN, HL, tobacco use, granulomatosis with polyangiitis (treated with high-dose prednisone), and NSCLC left lung.    6/4/24- He received cycle 8 of Durvalumab and presented to the office on Monday with nausea and decreased p.o. intake as 
No

## 2024-11-12 NOTE — OB PROVIDER TRIAGE NOTE - NS_DISCHARGEPRINT_OBGYN_ALL_OB
[facility]  *** FINAL REPORT ***    Name: Edith Patricio  MRN: [id number]    Outpatient  : 15 Dec 1948  HIS Order #: [id number]  [id number] [facility] Visit #: [id number]  Date: 24 May 2018    TYPE OF TEST: Peripheral Venous Testing    REASON FOR TEST  Edema    Right Leg:-  Deep venous thrombosis:           No  Superficial venous thrombosis:    No  Deep venous insufficiency:        Not examined  Superficial venous insufficiency: Not examined      INTERPRETATION/FINDINGS  Duplex images were obtained using 2-D gray scale, color flow, and  spectral Doppler analysis. Right leg :  1. Deep vein(s) visualized include the common femoral, proximal  femoral, mid femoral, distal femoral, popliteal(above knee),  popliteal(fossa), popliteal(below knee), posterior tibial and peroneal   veins. 2. No evidence of deep venous thrombosis detected in the veins  visualized. 3. No evidence of deep vein thrombosis in the contralateral common  femoral vein. 4. Superficial vein(s) visualized include the great saphenous and  small saphenous veins. 5. No evidence of superficial thrombosis detected. 6. Pulsatile flow detected consistent with increased central venous  pressure. ADDITIONAL COMMENTS  Wet reading attempted, was on hold >10 mins to speak with  provider/nurse. I have personally reviewed the data relevant to the interpretation of  this  study. TECHNOLOGIST: Ethel Rizvi. Velvet Negro  Signed: 2018 01:12 PM    PHYSICIAN: Evelena Epley.  Collene Kanner, MD  Signed: 2018 05:52 PM  OB Discharge Instructions

## 2024-11-12 NOTE — OB RN TRIAGE NOTE - CHIEF COMPLAINT QUOTE
lightheade bp at home 157/98 "Contractions, mostly on the right side of the belly and lower abdomen "

## 2024-11-13 ENCOUNTER — NON-APPOINTMENT (OUTPATIENT)
Age: 34
End: 2024-11-13

## 2024-11-14 DIAGNOSIS — Z3A.35 35 WEEKS GESTATION OF PREGNANCY: ICD-10-CM

## 2024-11-14 DIAGNOSIS — O34.219 MATERNAL CARE FOR UNSPECIFIED TYPE SCAR FROM PREVIOUS CESAREAN DELIVERY: ICD-10-CM

## 2024-11-14 DIAGNOSIS — O47.03 FALSE LABOR BEFORE 37 COMPLETED WEEKS OF GESTATION, THIRD TRIMESTER: ICD-10-CM

## 2024-11-15 ENCOUNTER — APPOINTMENT (OUTPATIENT)
Dept: ANTEPARTUM | Facility: CLINIC | Age: 34
End: 2024-11-15
Payer: MEDICAID

## 2024-11-15 ENCOUNTER — ASOB RESULT (OUTPATIENT)
Age: 34
End: 2024-11-15

## 2024-11-15 PROCEDURE — 76818 FETAL BIOPHYS PROFILE W/NST: CPT

## 2024-11-15 PROCEDURE — 76816 OB US FOLLOW-UP PER FETUS: CPT

## 2024-11-19 ENCOUNTER — APPOINTMENT (OUTPATIENT)
Dept: OBGYN | Facility: CLINIC | Age: 34
End: 2024-11-19
Payer: MEDICAID

## 2024-11-19 VITALS
HEIGHT: 70 IN | DIASTOLIC BLOOD PRESSURE: 80 MMHG | WEIGHT: 293 LBS | SYSTOLIC BLOOD PRESSURE: 128 MMHG | BODY MASS INDEX: 41.95 KG/M2

## 2024-11-19 DIAGNOSIS — R16.2 HEPATOMEGALY WITH SPLENOMEGALY, NOT ELSEWHERE CLASSIFIED: ICD-10-CM

## 2024-11-19 DIAGNOSIS — Z3A.36 36 WEEKS GESTATION OF PREGNANCY: ICD-10-CM

## 2024-11-19 PROCEDURE — 0502F SUBSEQUENT PRENATAL CARE: CPT

## 2024-11-19 PROCEDURE — 36415 COLL VENOUS BLD VENIPUNCTURE: CPT

## 2024-11-20 ENCOUNTER — NON-APPOINTMENT (OUTPATIENT)
Age: 34
End: 2024-11-20

## 2024-11-22 ENCOUNTER — ASOB RESULT (OUTPATIENT)
Age: 34
End: 2024-11-22

## 2024-11-22 ENCOUNTER — APPOINTMENT (OUTPATIENT)
Dept: ANTEPARTUM | Facility: CLINIC | Age: 34
End: 2024-11-22
Payer: MEDICAID

## 2024-11-22 LAB
ALBUMIN SERPL ELPH-MCNC: 3.6 G/DL
ALP BLD-CCNC: 99 U/L
ALT SERPL-CCNC: 9 U/L
ANION GAP SERPL CALC-SCNC: 15 MMOL/L
AST SERPL-CCNC: 14 U/L
BASOPHILS # BLD AUTO: 0.02 K/UL
BASOPHILS NFR BLD AUTO: 0.2 %
BILIRUB SERPL-MCNC: 0.2 MG/DL
BUN SERPL-MCNC: 13 MG/DL
CALCIUM SERPL-MCNC: 9.4 MG/DL
CHLORIDE SERPL-SCNC: 107 MMOL/L
CO2 SERPL-SCNC: 15 MMOL/L
CREAT SERPL-MCNC: 0.43 MG/DL
EGFR: 130 ML/MIN/1.73M2
EOSINOPHIL # BLD AUTO: 0.05 K/UL
EOSINOPHIL NFR BLD AUTO: 0.5 %
GLUCOSE SERPL-MCNC: 93 MG/DL
GP B STREP DNA SPEC QL NAA+PROBE: NOT DETECTED
HCT VFR BLD CALC: 38.1 %
HGB BLD-MCNC: 12.3 G/DL
HIV1+2 AB SPEC QL IA.RAPID: NONREACTIVE
IMM GRANULOCYTES NFR BLD AUTO: 0.5 %
LYMPHOCYTES # BLD AUTO: 1.43 K/UL
LYMPHOCYTES NFR BLD AUTO: 14.5 %
MAN DIFF?: NORMAL
MCHC RBC-ENTMCNC: 27.4 PG
MCHC RBC-ENTMCNC: 32.3 G/DL
MCV RBC AUTO: 84.9 FL
MONOCYTES # BLD AUTO: 0.56 K/UL
MONOCYTES NFR BLD AUTO: 5.7 %
NEUTROPHILS # BLD AUTO: 7.74 K/UL
NEUTROPHILS NFR BLD AUTO: 78.6 %
PLATELET # BLD AUTO: 183 K/UL
POTASSIUM SERPL-SCNC: 4.5 MMOL/L
PROT SERPL-MCNC: 6.2 G/DL
RBC # BLD: 4.49 M/UL
RBC # FLD: 14.1 %
SODIUM SERPL-SCNC: 137 MMOL/L
SOURCE GBS: NORMAL
T PALLIDUM AB SER QL IA: NEGATIVE
WBC # FLD AUTO: 9.85 K/UL

## 2024-11-22 PROCEDURE — 76819 FETAL BIOPHYS PROFIL W/O NST: CPT

## 2024-11-25 NOTE — ED CDU PROVIDER INITIAL DAY NOTE - MUSCULOSKELETAL, MLM
Spine appears normal, range of motion is not limited, no muscle or joint tenderness ESRD on HD  MWF   Follows with Dr. Griffiths   Last dialysis 11/18- tolerated well, 2.5  removed     -Nephro Dr. Griffiths consulted   -renal diet  -fluid restriction

## 2024-11-26 ENCOUNTER — APPOINTMENT (OUTPATIENT)
Dept: OBGYN | Facility: CLINIC | Age: 34
End: 2024-11-26
Payer: MEDICAID

## 2024-11-26 VITALS
BODY MASS INDEX: 41.95 KG/M2 | SYSTOLIC BLOOD PRESSURE: 130 MMHG | DIASTOLIC BLOOD PRESSURE: 90 MMHG | WEIGHT: 293 LBS | HEIGHT: 70 IN

## 2024-11-26 DIAGNOSIS — Z3A.37 37 WEEKS GESTATION OF PREGNANCY: ICD-10-CM

## 2024-11-26 DIAGNOSIS — K64.4 RESIDUAL HEMORRHOIDAL SKIN TAGS: ICD-10-CM

## 2024-11-26 PROCEDURE — 0502F SUBSEQUENT PRENATAL CARE: CPT

## 2024-11-26 RX ORDER — HYDROCORTISONE ACETATE 25 MG/1
25 SUPPOSITORY RECTAL
Qty: 1 | Refills: 0 | Status: ACTIVE | COMMUNITY
Start: 2024-11-26 | End: 1900-01-01

## 2024-12-03 ENCOUNTER — APPOINTMENT (OUTPATIENT)
Dept: OBGYN | Facility: CLINIC | Age: 34
End: 2024-12-03
Payer: MEDICAID

## 2024-12-03 VITALS
WEIGHT: 293 LBS | BODY MASS INDEX: 41.95 KG/M2 | SYSTOLIC BLOOD PRESSURE: 140 MMHG | DIASTOLIC BLOOD PRESSURE: 80 MMHG | HEIGHT: 70 IN

## 2024-12-03 PROBLEM — Z3A.38 38 WEEKS GESTATION OF PREGNANCY: Status: ACTIVE | Noted: 2024-12-03

## 2024-12-03 LAB
BILIRUB UR QL STRIP: NORMAL
GLUCOSE UR-MCNC: NORMAL
HCG UR QL: 0.2 EU/DL
HGB UR QL STRIP.AUTO: NORMAL
KETONES UR-MCNC: NORMAL
LEUKOCYTE ESTERASE UR QL STRIP: NORMAL
NITRITE UR QL STRIP: NORMAL
PH UR STRIP: 6
PROT UR STRIP-MCNC: 100
SP GR UR STRIP: 1.03

## 2024-12-03 PROCEDURE — 0502F SUBSEQUENT PRENATAL CARE: CPT

## 2024-12-03 PROCEDURE — 59426 ANTEPARTUM CARE ONLY: CPT

## 2024-12-04 ENCOUNTER — OUTPATIENT (OUTPATIENT)
Dept: OUTPATIENT SERVICES | Facility: HOSPITAL | Age: 34
LOS: 1 days | End: 2024-12-04

## 2024-12-04 ENCOUNTER — TRANSCRIPTION ENCOUNTER (OUTPATIENT)
Age: 34
End: 2024-12-04

## 2024-12-04 ENCOUNTER — NON-APPOINTMENT (OUTPATIENT)
Age: 34
End: 2024-12-04

## 2024-12-04 ENCOUNTER — INPATIENT (INPATIENT)
Facility: HOSPITAL | Age: 34
LOS: 2 days | Discharge: HOME | End: 2024-12-07
Attending: OBSTETRICS & GYNECOLOGY | Admitting: OBSTETRICS & GYNECOLOGY
Payer: MEDICAID

## 2024-12-04 ENCOUNTER — ASOB RESULT (OUTPATIENT)
Age: 34
End: 2024-12-04

## 2024-12-04 ENCOUNTER — RESULT REVIEW (OUTPATIENT)
Age: 34
End: 2024-12-04

## 2024-12-04 ENCOUNTER — EMERGENCY (EMERGENCY)
Facility: HOSPITAL | Age: 34
LOS: 1 days | Discharge: STILL ADMITTED OR ADMINISTRATIVE | End: 2024-12-04
Admitting: EMERGENCY MEDICINE
Payer: MEDICAID

## 2024-12-04 ENCOUNTER — APPOINTMENT (OUTPATIENT)
Dept: ANTEPARTUM | Facility: CLINIC | Age: 34
End: 2024-12-04

## 2024-12-04 VITALS — TEMPERATURE: 99 F | RESPIRATION RATE: 16 BRPM

## 2024-12-04 VITALS
DIASTOLIC BLOOD PRESSURE: 95 MMHG | WEIGHT: 293 LBS | SYSTOLIC BLOOD PRESSURE: 143 MMHG | TEMPERATURE: 98 F | HEIGHT: 67 IN | RESPIRATION RATE: 18 BRPM | HEART RATE: 88 BPM | OXYGEN SATURATION: 99 %

## 2024-12-04 VITALS
DIASTOLIC BLOOD PRESSURE: 92 MMHG | HEART RATE: 80 BPM | TEMPERATURE: 99 F | RESPIRATION RATE: 20 BRPM | SYSTOLIC BLOOD PRESSURE: 174 MMHG | WEIGHT: 293 LBS | OXYGEN SATURATION: 98 % | HEIGHT: 67 IN

## 2024-12-04 DIAGNOSIS — K08.409 PARTIAL LOSS OF TEETH, UNSPECIFIED CAUSE, UNSPECIFIED CLASS: Chronic | ICD-10-CM

## 2024-12-04 DIAGNOSIS — Z98.890 OTHER SPECIFIED POSTPROCEDURAL STATES: Chronic | ICD-10-CM

## 2024-12-04 DIAGNOSIS — Z98.891 HISTORY OF UTERINE SCAR FROM PREVIOUS SURGERY: ICD-10-CM

## 2024-12-04 DIAGNOSIS — O26.899 OTHER SPECIFIED PREGNANCY RELATED CONDITIONS, UNSPECIFIED TRIMESTER: ICD-10-CM

## 2024-12-04 DIAGNOSIS — Z98.891 HISTORY OF UTERINE SCAR FROM PREVIOUS SURGERY: Chronic | ICD-10-CM

## 2024-12-04 DIAGNOSIS — R03.0 ELEVATED BLOOD-PRESSURE READING, WITHOUT DIAGNOSIS OF HYPERTENSION: ICD-10-CM

## 2024-12-04 DIAGNOSIS — Z34.90 ENCOUNTER FOR SUPERVISION OF NORMAL PREGNANCY, UNSPECIFIED, UNSPECIFIED TRIMESTER: ICD-10-CM

## 2024-12-04 LAB
ALBUMIN SERPL ELPH-MCNC: 3.4 G/DL — SIGNIFICANT CHANGE UP (ref 3.3–5)
ALP SERPL-CCNC: 94 U/L — SIGNIFICANT CHANGE UP (ref 40–120)
ALT FLD-CCNC: 12 U/L — SIGNIFICANT CHANGE UP (ref 4–33)
ANION GAP SERPL CALC-SCNC: 12 MMOL/L — SIGNIFICANT CHANGE UP (ref 7–14)
APPEARANCE UR: ABNORMAL
APPEARANCE UR: ABNORMAL
AST SERPL-CCNC: 14 U/L — SIGNIFICANT CHANGE UP (ref 4–32)
BACTERIA # UR AUTO: ABNORMAL /HPF
BASOPHILS # BLD AUTO: 0.02 K/UL — SIGNIFICANT CHANGE UP (ref 0–0.2)
BASOPHILS # BLD AUTO: 0.02 K/UL — SIGNIFICANT CHANGE UP (ref 0–0.2)
BASOPHILS NFR BLD AUTO: 0.2 % — SIGNIFICANT CHANGE UP (ref 0–2)
BASOPHILS NFR BLD AUTO: 0.2 % — SIGNIFICANT CHANGE UP (ref 0–2)
BILIRUB SERPL-MCNC: 0.3 MG/DL — SIGNIFICANT CHANGE UP (ref 0.2–1.2)
BILIRUB UR-MCNC: ABNORMAL
BILIRUB UR-MCNC: NEGATIVE — SIGNIFICANT CHANGE UP
BLD GP AB SCN SERPL QL: NEGATIVE — SIGNIFICANT CHANGE UP
BUN SERPL-MCNC: 18 MG/DL — SIGNIFICANT CHANGE UP (ref 7–23)
CALCIUM SERPL-MCNC: 9 MG/DL — SIGNIFICANT CHANGE UP (ref 8.4–10.5)
CAST: 1 /LPF — SIGNIFICANT CHANGE UP (ref 0–4)
CHLORIDE SERPL-SCNC: 105 MMOL/L — SIGNIFICANT CHANGE UP (ref 98–107)
CO2 SERPL-SCNC: 21 MMOL/L — LOW (ref 22–31)
COLOR SPEC: SIGNIFICANT CHANGE UP
COLOR SPEC: SIGNIFICANT CHANGE UP
CREAT ?TM UR-MCNC: 295 MG/DL — SIGNIFICANT CHANGE UP
CREAT SERPL-MCNC: 0.54 MG/DL — SIGNIFICANT CHANGE UP (ref 0.5–1.3)
DIFF PNL FLD: NEGATIVE — SIGNIFICANT CHANGE UP
DIFF PNL FLD: NEGATIVE — SIGNIFICANT CHANGE UP
EGFR: 123 ML/MIN/1.73M2 — SIGNIFICANT CHANGE UP
EOSINOPHIL # BLD AUTO: 0.03 K/UL — SIGNIFICANT CHANGE UP (ref 0–0.5)
EOSINOPHIL # BLD AUTO: 0.03 K/UL — SIGNIFICANT CHANGE UP (ref 0–0.5)
EOSINOPHIL NFR BLD AUTO: 0.3 % — SIGNIFICANT CHANGE UP (ref 0–6)
EOSINOPHIL NFR BLD AUTO: 0.3 % — SIGNIFICANT CHANGE UP (ref 0–6)
GLUCOSE SERPL-MCNC: 80 MG/DL — SIGNIFICANT CHANGE UP (ref 70–99)
GLUCOSE UR QL: NEGATIVE MG/DL — SIGNIFICANT CHANGE UP
GLUCOSE UR QL: NEGATIVE MG/DL — SIGNIFICANT CHANGE UP
HCT VFR BLD CALC: 34.2 % — LOW (ref 34.5–45)
HCT VFR BLD CALC: 36.8 % — SIGNIFICANT CHANGE UP (ref 34.5–45)
HGB BLD-MCNC: 11.4 G/DL — LOW (ref 11.5–15.5)
HGB BLD-MCNC: 11.9 G/DL — SIGNIFICANT CHANGE UP (ref 11.5–15.5)
IANC: 6.97 K/UL — SIGNIFICANT CHANGE UP (ref 1.8–7.4)
IMM GRANULOCYTES NFR BLD AUTO: 0.4 % — SIGNIFICANT CHANGE UP (ref 0–0.9)
IMM GRANULOCYTES NFR BLD AUTO: 0.4 % — SIGNIFICANT CHANGE UP (ref 0–0.9)
KETONES UR-MCNC: ABNORMAL MG/DL
KETONES UR-MCNC: ABNORMAL MG/DL
LDH SERPL L TO P-CCNC: 186 U/L — SIGNIFICANT CHANGE UP (ref 135–225)
LEUKOCYTE ESTERASE UR-ACNC: ABNORMAL
LEUKOCYTE ESTERASE UR-ACNC: ABNORMAL
LYMPHOCYTES # BLD AUTO: 1.57 K/UL — SIGNIFICANT CHANGE UP (ref 1–3.3)
LYMPHOCYTES # BLD AUTO: 1.58 K/UL — SIGNIFICANT CHANGE UP (ref 1–3.3)
LYMPHOCYTES # BLD AUTO: 17.3 % — SIGNIFICANT CHANGE UP (ref 13–44)
LYMPHOCYTES # BLD AUTO: 17.4 % — SIGNIFICANT CHANGE UP (ref 13–44)
MCHC RBC-ENTMCNC: 27 PG — SIGNIFICANT CHANGE UP (ref 27–34)
MCHC RBC-ENTMCNC: 27.7 PG — SIGNIFICANT CHANGE UP (ref 27–34)
MCHC RBC-ENTMCNC: 32.3 G/DL — SIGNIFICANT CHANGE UP (ref 32–36)
MCHC RBC-ENTMCNC: 33.3 G/DL — SIGNIFICANT CHANGE UP (ref 32–36)
MCV RBC AUTO: 83.2 FL — SIGNIFICANT CHANGE UP (ref 80–100)
MCV RBC AUTO: 83.4 FL — SIGNIFICANT CHANGE UP (ref 80–100)
MONOCYTES # BLD AUTO: 0.49 K/UL — SIGNIFICANT CHANGE UP (ref 0–0.9)
MONOCYTES # BLD AUTO: 0.5 K/UL — SIGNIFICANT CHANGE UP (ref 0–0.9)
MONOCYTES NFR BLD AUTO: 5.4 % — SIGNIFICANT CHANGE UP (ref 2–14)
MONOCYTES NFR BLD AUTO: 5.5 % — SIGNIFICANT CHANGE UP (ref 2–14)
NEUTROPHILS # BLD AUTO: 6.85 K/UL — SIGNIFICANT CHANGE UP (ref 1.8–7.4)
NEUTROPHILS # BLD AUTO: 6.97 K/UL — SIGNIFICANT CHANGE UP (ref 1.8–7.4)
NEUTROPHILS NFR BLD AUTO: 76.2 % — SIGNIFICANT CHANGE UP (ref 43–77)
NEUTROPHILS NFR BLD AUTO: 76.4 % — SIGNIFICANT CHANGE UP (ref 43–77)
NITRITE UR-MCNC: NEGATIVE — SIGNIFICANT CHANGE UP
NITRITE UR-MCNC: NEGATIVE — SIGNIFICANT CHANGE UP
NRBC # BLD: 0 /100 WBCS — SIGNIFICANT CHANGE UP (ref 0–0)
NRBC # FLD: 0 K/UL — SIGNIFICANT CHANGE UP (ref 0–0)
PH UR: 6 — SIGNIFICANT CHANGE UP (ref 5–8)
PH UR: 6 — SIGNIFICANT CHANGE UP (ref 5–8)
PLATELET # BLD AUTO: 170 K/UL — SIGNIFICANT CHANGE UP (ref 150–400)
PLATELET # BLD AUTO: 171 K/UL — SIGNIFICANT CHANGE UP (ref 150–400)
POTASSIUM SERPL-MCNC: 4.1 MMOL/L — SIGNIFICANT CHANGE UP (ref 3.5–5.3)
POTASSIUM SERPL-SCNC: 4.1 MMOL/L — SIGNIFICANT CHANGE UP (ref 3.5–5.3)
PROT ?TM UR-MCNC: 181 MG/DL — SIGNIFICANT CHANGE UP
PROT SERPL-MCNC: 6.1 G/DL — SIGNIFICANT CHANGE UP (ref 6–8.3)
PROT UR-MCNC: 100 MG/DL
PROT UR-MCNC: 300 MG/DL
PROT/CREAT UR-RTO: 0.6 RATIO — HIGH (ref 0–0.2)
RBC # BLD: 4.11 M/UL — SIGNIFICANT CHANGE UP (ref 3.8–5.2)
RBC # BLD: 4.41 M/UL — SIGNIFICANT CHANGE UP (ref 3.8–5.2)
RBC # FLD: 13.7 % — SIGNIFICANT CHANGE UP (ref 10.3–14.5)
RBC # FLD: 13.9 % — SIGNIFICANT CHANGE UP (ref 10.3–14.5)
RBC CASTS # UR COMP ASSIST: 2 /HPF — SIGNIFICANT CHANGE UP (ref 0–4)
REVIEW: SIGNIFICANT CHANGE UP
RH IG SCN BLD-IMP: POSITIVE — SIGNIFICANT CHANGE UP
RH IG SCN BLD-IMP: POSITIVE — SIGNIFICANT CHANGE UP
SODIUM SERPL-SCNC: 138 MMOL/L — SIGNIFICANT CHANGE UP (ref 135–145)
SP GR SPEC: 1.03 — HIGH (ref 1–1.03)
SP GR SPEC: 1.03 — HIGH (ref 1–1.03)
SQUAMOUS # UR AUTO: 60 /HPF — HIGH (ref 0–5)
T PALLIDUM AB TITR SER: NEGATIVE — SIGNIFICANT CHANGE UP
URATE SERPL-MCNC: 6.1 MG/DL — SIGNIFICANT CHANGE UP (ref 2.5–7)
UROBILINOGEN FLD QL: 0.2 MG/DL — SIGNIFICANT CHANGE UP (ref 0.2–1)
UROBILINOGEN FLD QL: 1 MG/DL — SIGNIFICANT CHANGE UP (ref 0.2–1)
WBC # BLD: 9.01 K/UL — SIGNIFICANT CHANGE UP (ref 3.8–10.5)
WBC # BLD: 9.13 K/UL — SIGNIFICANT CHANGE UP (ref 3.8–10.5)
WBC # FLD AUTO: 9.01 K/UL — SIGNIFICANT CHANGE UP (ref 3.8–10.5)
WBC # FLD AUTO: 9.13 K/UL — SIGNIFICANT CHANGE UP (ref 3.8–10.5)
WBC UR QL: 55 /HPF — HIGH (ref 0–5)

## 2024-12-04 PROCEDURE — 88302 TISSUE EXAM BY PATHOLOGIST: CPT | Mod: 26

## 2024-12-04 PROCEDURE — 58611 LIGATE OVIDUCT(S) ADD-ON: CPT | Mod: GC

## 2024-12-04 PROCEDURE — 59514 CESAREAN DELIVERY ONLY: CPT | Mod: U9

## 2024-12-04 PROCEDURE — 76815 OB US LIMITED FETUS(S): CPT | Mod: 26

## 2024-12-04 PROCEDURE — L9996: CPT

## 2024-12-04 RX ORDER — DEXAMETHASONE 1.5 MG/1
4 TABLET ORAL EVERY 6 HOURS
Refills: 0 | Status: DISCONTINUED | OUTPATIENT
Start: 2024-12-04 | End: 2024-12-06

## 2024-12-04 RX ORDER — TETANUS TOXOID, REDUCED DIPHTHERIA TOXOID AND ACELLULAR PERTUSSIS VACCINE, ADSORBED 5; 2.5; 8; 8; 2.5 [IU]/.5ML; [IU]/.5ML; UG/.5ML; UG/.5ML; UG/.5ML
0.5 SUSPENSION INTRAMUSCULAR ONCE
Refills: 0 | Status: COMPLETED | OUTPATIENT
Start: 2024-12-04

## 2024-12-04 RX ORDER — IBUPROFEN 200 MG
600 TABLET ORAL EVERY 6 HOURS
Refills: 0 | Status: COMPLETED | OUTPATIENT
Start: 2024-12-04 | End: 2025-11-02

## 2024-12-04 RX ORDER — NALOXONE HCL 0.4 MG/ML
0.1 AMPUL (ML) INJECTION
Refills: 0 | Status: DISCONTINUED | OUTPATIENT
Start: 2024-12-04 | End: 2024-12-06

## 2024-12-04 RX ORDER — FAMOTIDINE 20 MG/1
20 TABLET, FILM COATED ORAL ONCE
Refills: 0 | Status: COMPLETED | OUTPATIENT
Start: 2024-12-04 | End: 2024-12-04

## 2024-12-04 RX ORDER — 0.9 % SODIUM CHLORIDE 0.9 %
1000 INTRAVENOUS SOLUTION INTRAVENOUS
Refills: 0 | Status: DISCONTINUED | OUTPATIENT
Start: 2024-12-04 | End: 2024-12-04

## 2024-12-04 RX ORDER — HEPARIN SODIUM,PORCINE 1000/ML
10000 VIAL (ML) INJECTION EVERY 12 HOURS
Refills: 0 | Status: DISCONTINUED | OUTPATIENT
Start: 2024-12-05 | End: 2024-12-07

## 2024-12-04 RX ORDER — DIPHENHYDRAMINE HCL 25 MG
25 CAPSULE ORAL ONCE
Refills: 0 | Status: COMPLETED | OUTPATIENT
Start: 2024-12-04 | End: 2024-12-04

## 2024-12-04 RX ORDER — KETOROLAC TROMETHAMINE 30 MG/ML
30 INJECTION INTRAMUSCULAR; INTRAVENOUS EVERY 6 HOURS
Refills: 0 | Status: DISCONTINUED | OUTPATIENT
Start: 2024-12-04 | End: 2024-12-05

## 2024-12-04 RX ORDER — TRISODIUM CITRATE DIHYDRATE AND CITRIC ACID MONOHYDRATE 500; 334 MG/5ML; MG/5ML
30 SOLUTION ORAL ONCE
Refills: 0 | Status: COMPLETED | OUTPATIENT
Start: 2024-12-04 | End: 2024-12-04

## 2024-12-04 RX ORDER — OXYCODONE HYDROCHLORIDE 30 MG/1
5 TABLET ORAL
Refills: 0 | Status: DISCONTINUED | OUTPATIENT
Start: 2024-12-04 | End: 2024-12-05

## 2024-12-04 RX ORDER — OXYCODONE HYDROCHLORIDE 30 MG/1
5 TABLET ORAL
Refills: 0 | Status: COMPLETED | OUTPATIENT
Start: 2024-12-04 | End: 2024-12-11

## 2024-12-04 RX ORDER — LANOLIN 72 %
1 OINTMENT (GRAM) TOPICAL EVERY 6 HOURS
Refills: 0 | Status: DISCONTINUED | OUTPATIENT
Start: 2024-12-04 | End: 2024-12-07

## 2024-12-04 RX ORDER — SIMETHICONE 125 MG
80 CAPSULE ORAL EVERY 4 HOURS
Refills: 0 | Status: DISCONTINUED | OUTPATIENT
Start: 2024-12-04 | End: 2024-12-07

## 2024-12-04 RX ORDER — DIPHENHYDRAMINE HCL 25 MG
25 CAPSULE ORAL EVERY 6 HOURS
Refills: 0 | Status: DISCONTINUED | OUTPATIENT
Start: 2024-12-04 | End: 2024-12-07

## 2024-12-04 RX ORDER — OXYCODONE HYDROCHLORIDE 30 MG/1
5 TABLET ORAL ONCE
Refills: 0 | Status: DISCONTINUED | OUTPATIENT
Start: 2024-12-04 | End: 2024-12-07

## 2024-12-04 RX ORDER — 0.9 % SODIUM CHLORIDE 0.9 %
1000 INTRAVENOUS SOLUTION INTRAVENOUS
Refills: 0 | Status: COMPLETED | OUTPATIENT
Start: 2024-12-04 | End: 2024-12-04

## 2024-12-04 RX ORDER — CHLORHEXIDINE GLUCONATE 1.2 MG/ML
1 RINSE ORAL DAILY
Refills: 0 | Status: DISCONTINUED | OUTPATIENT
Start: 2024-12-04 | End: 2024-12-04

## 2024-12-04 RX ORDER — ONDANSETRON HYDROCHLORIDE 4 MG/1
4 TABLET, FILM COATED ORAL EVERY 6 HOURS
Refills: 0 | Status: DISCONTINUED | OUTPATIENT
Start: 2024-12-04 | End: 2024-12-06

## 2024-12-04 RX ORDER — NALBUPHINE HYDROCHLORIDE 10 MG/ML
2.5 INJECTION INTRAMUSCULAR; INTRAVENOUS; SUBCUTANEOUS EVERY 6 HOURS
Refills: 0 | Status: DISCONTINUED | OUTPATIENT
Start: 2024-12-04 | End: 2024-12-06

## 2024-12-04 RX ORDER — 0.9 % SODIUM CHLORIDE 0.9 %
1000 INTRAVENOUS SOLUTION INTRAVENOUS
Refills: 0 | Status: DISCONTINUED | OUTPATIENT
Start: 2024-12-04 | End: 2024-12-05

## 2024-12-04 RX ORDER — ACETAMINOPHEN 500MG 500 MG/1
975 TABLET, COATED ORAL
Refills: 0 | Status: DISCONTINUED | OUTPATIENT
Start: 2024-12-04 | End: 2024-12-07

## 2024-12-04 RX ADMIN — TRISODIUM CITRATE DIHYDRATE AND CITRIC ACID MONOHYDRATE 30 MILLILITER(S): 500; 334 SOLUTION ORAL at 19:28

## 2024-12-04 RX ADMIN — CHLORHEXIDINE GLUCONATE 1 APPLICATION(S): 1.2 RINSE ORAL at 19:29

## 2024-12-04 RX ADMIN — Medication 25 MILLIGRAM(S): at 22:47

## 2024-12-04 RX ADMIN — FAMOTIDINE 20 MILLIGRAM(S): 20 TABLET, FILM COATED ORAL at 19:29

## 2024-12-04 RX ADMIN — Medication 200 MILLILITER(S): at 19:29

## 2024-12-04 RX ADMIN — ACETAMINOPHEN 500MG 975 MILLIGRAM(S): 500 TABLET, COATED ORAL at 23:09

## 2024-12-04 RX ADMIN — Medication 200 MILLILITER(S): at 14:16

## 2024-12-04 NOTE — OB PROVIDER TRIAGE NOTE - NSHPPHYSICALEXAM_GEN_ALL_CORE
T(C): 37.1 (12-04-24 @ 11:14), Max: 37.4 (12-04-24 @ 10:52)  HR: 80 (12-04-24 @ 11:35) (80 - 93)  BP: 131/74 (12-04-24 @ 11:35) (131/74 - 174/92)  RR: 16 (12-04-24 @ 11:14) (16 - 20)  SpO2: 98% (12-04-24 @ 10:52) (98% - 99%)    General: Female sitting comfortably   Head: Normocephalic. Atraumatic.   Eyes: No discharge, lids normal, conjunctiva normal  Lungs: No respiratory distress  Abdomen: Soft, nontender. Gravid. Mild contractions on palpation  TAUS:  Sono saved in ASOB.   NST: 150 baseline with moderate variability, accelerations present, no decelerations, reactive  CTX: irregular   Neuro: No facial asymmetry, no slurred speech, moves all 4 extremities  Mood: Alert and lucid, appropriate mood and affect ICU Vital Signs Last 24 Hrs  T(C): 37.1 (04 Dec 2024 11:14), Max: 37.4 (04 Dec 2024 10:52)  T(F): 98.8 (04 Dec 2024 11:14), Max: 99.4 (04 Dec 2024 10:52)  HR: 81 (04 Dec 2024 12:02) (80 - 93)  BP: 147/83 (04 Dec 2024 12:02) (131/74 - 174/92)    General: Female sitting comfortably   Head: Normocephalic. Atraumatic.   Eyes: No discharge, lids normal, conjunctiva normal  Lungs: No respiratory distress  Abdomen: Soft, nontender. Gravid. Mild contractions on palpation  TAUS:  Sono saved in ASOB.   NST: 150 baseline with moderate variability, accelerations present, no decelerations, reactive  CTX: irregular   Neuro: No facial asymmetry, no slurred speech, moves all 4 extremities  Mood: Alert and lucid, appropriate mood and affect

## 2024-12-04 NOTE — OB PROVIDER TRIAGE NOTE - HISTORY OF PRESENT ILLNESS
's patient is a 33 y/o EDC 2024 EGA 39  presents from ED with blood pressure 174/92. Patient sent from presurgical testing by ambulance for blood pressures : 145/96 and 154/98 as per patient's memory. Patient denies headache, nausea, vomiting, visual disturbances, right upper quadrant pain, epigastric pain. Patient reports of fetal movement. Denies loss of fluid, vaginal bleeding, cramping, contractions.     Antepartum History:  -Scheduled for repeat c/s 2024  -Denies history of hypertension prior to pregnancy, in pregnancy or previous pregnancies   - BMI 45.6

## 2024-12-04 NOTE — OB PROVIDER H&P - NS ATTEND AMEND GEN_ALL_CORE FT
Agree w/ above. 33yo  at 39w now meeting criteria for PEC w/o SF. Hx CSx1, desires RCS + BS. BP stable, pt asx, HELLP labs wnl. FHR cat 1. Hx also notable for BMI >40, AMA.    Counseled pt on risks of C/S including infection, bleeding, injury to surrounding structures, transfusion, and hysterectomy. Reviewed the procedure, the typical recovery course, and the follow up. Reviewed plan for bilateral salpingectomy rather than tubal ligation to reduce ovarian cancer risk. Reviewed this procedure is permanent. All questions answered and consents signed.    Will proceed to OR when 8 hours NPO or sooner if there is a change in maternal or fetal status. 2U PRBC on hold.    J Goldberg MD

## 2024-12-04 NOTE — OB RN PATIENT PROFILE - SPIRITUAL CULTURAL, CURRENT SITUATION, PROFILE
Patient was brought in by EMS from [facility] for choking at two large pieces of steak. EMS performed the heimlich maneuver due to patient choking and not breathing. Unknown for how long patient was not breathing. There was complete airway blockage per EMS. Patient has CP from heimlich maneuver. Patient was unresponsive and suctioned at the restaurant. Unknown Hx.    none

## 2024-12-04 NOTE — OB PST NOTE - HISTORY OF PRESENT ILLNESS
35 yo pregnant female presents to pre surgical testing. Pt reports good fetal movement.  Pt denies vaginal bleeding, spotting or leakage of fluid.  Pt denies regular uterine contractions.  Pt is scheduled for repeat  section with bilateral tubal ligation.

## 2024-12-04 NOTE — OB PROVIDER H&P - ASSESSMENT
's patient is a 33 y/o EDC 2024 EGA 39  admit for repeat c/s, gestational hypertension vs preeclampsia  - Discussed with Dr.Goldberg  - Admit to Labor and Delivery  - Continuous EFM  - NPO diet, IV fluids  - Blood consent signed  - Standard labs (T&S, CBC, RPR)  - HELLP labs, awaiting results  - bp monitoring in progress  - OB Huddle to be completed once Dr.Goldberg completed providing patient care in OR.

## 2024-12-04 NOTE — OB PROVIDER DELIVERY SUMMARY - NSPROVIDERDELIVERYNOTE_OBGYN_ALL_OB_FT
Viable male infant, 3380g, 8/9 APGARS, cephalic presentation.    Normal uterus, tubes, and ovaries.    Hysterotomy was closed in 1 layer with vicryl. Additional figure of vicryl at the center of the hysterotomy. Bilateral salpingectomy performed with the ligasure. Fascia closed with vicryl. Subcutaneous reapproximated with plain gut in 2 layers. Subcuticular skin closure with monocryl.    EBL: 557  IVF: 1600  UOP: 50    DICTATION #:    L Lexila PGY2 Viable male infant, 3380g, 8/9 APGARS, cephalic presentation.    Normal uterus, tubes, and ovaries.    Hysterotomy was closed in 1 layer with vicryl. Additional figure of eight with vicryl at the center of the hysterotomy. Bilateral salpingectomy performed with the ligasure. Fascia closed with vicryl. Subcutaneous reapproximated with plain gut in 2 layers. Subcuticular skin closure with monocryl.    EBL: 557  IVF: 1600  UOP: 50    DICTATION #:    L Phillip PGY2  J Goldberg MD Viable male infant, 3380g, 8/9 APGARS, cephalic presentation.    Normal uterus, tubes, and ovaries.    Hysterotomy was closed in 1 layer with vicryl. Additional figure of eight with vicryl at the center of the hysterotomy. Bilateral salpingectomy performed with the ligasure. Fascia closed with vicryl. Subcutaneous reapproximated with plain gut in 2 layers. Subcuticular skin closure with monocryl.    EBL: 557  IVF: 1600  UOP: 50    DICTATION #: [id number]    BRIAN Fisher PGY2  J Goldberg MD

## 2024-12-04 NOTE — OB PROVIDER H&P - NSHPLABSRESULTS_GEN_ALL_CORE
11.9   9.13  )-----------( 170      ( -04 @ 11:30 )             36.8       Urinalysis Basic - ( 04 Dec 2024 11:22 )    Color: Dark Yellow / Appearance: Turbid / S.034 / pH: x  Gluc: x / Ketone: Trace mg/dL  / Bili: Small / Urobili: 0.2 mg/dL   Blood: x / Protein: 300 mg/dL / Nitrite: Negative   Leuk Esterase: Small / RBC: 2 /HPF / WBC 55 /HPF   Sq Epi: x / Non Sq Epi: 60 /HPF / Bacteria: Many /HPF

## 2024-12-04 NOTE — OB RN INTRAOPERATIVE NOTE - NSSELHIDDEN_OBGYN_ALL_OB_FT
complains of pain/discomfort
[NS_DeliveryAttending1_OBGYN_ALL_OB_FT:[id number]],[NS_DeliveryAssist1_OBGYN_ALL_OB_FT:[id number]],[NS_DeliveryRN_OBGYN_ALL_OB_FT:[id number]]

## 2024-12-04 NOTE — OB PROVIDER TRIAGE NOTE - NSOBPROVIDERNOTE_OBGYN_ALL_OB_FT
's patient is a 35 y/o EDC 2024 EGA 39  encounter for elevated blood pressures  - HELLP labs obtained  - RPR and T/S sent 's patient is a 35 y/o EDC 2024 EGA 39  encounter for elevated blood pressures  - HELLP labs obtained  - RPR and T/S sent  - discussed with Dr.Goldberg, patient will be for repeat c/s due to contractions

## 2024-12-04 NOTE — DISCHARGE NOTE OB - ADDITIONAL INSTRUCTIONS
follow up in the office in two weeks follow up in the office for B/P and incisional check within 1 week

## 2024-12-04 NOTE — OB PST NOTE - NSHPPHYSICALEXAM_GEN_ALL_CORE
Constitutional: Well Developed, Well Groomed, Well Nourished, No Distress    Eyes: PERRL, EOMI, conjunctiva clear    Mouth & Gums: Normal    Neck: Supple, no JVD    Breast: Normal shape    Back: Normal shape, ROM intact, strength intact, no vertebral tenderness    Respiratory: Airway patent, breath sounds equal, good air movement, respiration non-labored, clear to auscultation bilateral    Cardiovascular:  Regular rate and rhythm    Gastrointestinal: + Gravid abdomen.  Soft, non-tender, non distention, bowel sound normal, no bruit, no rebound tenderness    Extremities: No clubbing, cyanosis, or pedal edema    Vascular: Radial Pulse normal    Neurological: alert & oriented x 3    Skin: warm and dry, normal color    Lymph Nodes: normal posterior cervical lymph node, normal anterior cervical lymph node    Musculoskeletal: ROM intact, no joint swelling, warmth, or calf tenderness. Normal strength    Psychiatric: normal affect, normal behavior

## 2024-12-04 NOTE — OB PROVIDER DELIVERY SUMMARY - NSLOWPPHRISK_OBGYN_A_OB
Poe Pregnancy/Less than or equal to 4 previous vaginal births/No known bleeding disorder/No history of postpartum hemorrhage

## 2024-12-04 NOTE — OB RN DELIVERY SUMMARY - AMNIOTIC FLUID ODOR, LABOR
Smoker     Packs/day: 0.50     Years: 20.00     Pack years: 10.00     Types: Cigarettes    Smokeless tobacco: Never Used   Substance Use Topics    Alcohol use: Yes     Alcohol/week: 0.0 standard drinks     Comment: rarely       REVIEW OF SYSTEMS:  Review of Systems   Constitutional: Negative. Skin:Denies any new changing, growing or bleeding lesions or rashes except as described in the HPI     PHYSICAL EXAM:   /72 (Site: Right Upper Arm, Position: Sitting, Cuff Size: Medium Adult)   Pulse 100   Ht 5' 2\" (1.575 m)   Wt 135 lb 9.6 oz (61.5 kg)   LMP 03/14/2013   SpO2 98%   BMI 24.80 kg/m²     General Exam:  General Appearance: No acute distress, Well nourished     Neuro: Alert and oriented to person, place and time  Psych: Normal affect   Lymph Node: Not performed    Cutaneous Exam: Performed as documented in clinic note below. Sun-exposed skin,which includes the head/face, neck, both arms, digits and/or nails was examined. Pertinent Physical Exam Findings:  Physical Exam  Skin:               Medical Necessity of Exam Performed:   Distribution of patient concerns    Additional Diagnostic Testing performed during exam: Not performed ,  Not performed    ASSESSMENT:   Diagnosis Orders   1. Epidermoid cyst         Plan of Action is as Follows:  Assessment 1. Epidermoid cyst  - Education  - Discussed benign etiology  - If bothersome and wants excision please call and we can schedule          Patient Instructions   Call office to schedule for a cyst excision if interested-ask to speak to the office for scheduling       Photo surveillance performed: Yes    Follow-up: PRN    This note was created with the assistance of aspeech-recognition program.  Although the intention is to generate a document that actually reflects thecontent of the visit, no guarantees can be provided that every mistake has been identified and corrected by editing.     Electronically signed by Mahnaz Ghosh MD on 3/3/20 at 1:20
normal

## 2024-12-04 NOTE — DISCHARGE NOTE OB - CARE PLAN
1 Principal Discharge DX:	Status post repeat low transverse  section  Assessment and plan of treatment:	routine care  Secondary Diagnosis:	Status post bilateral salpingectomy  Secondary Diagnosis:	Mild preeclampsia   Principal Discharge DX:	Status post repeat low transverse  section  Assessment and plan of treatment:	After discharge, please stay on pelvic rest for 6 weeks, meaning no sexual intercourse, no tampons and no douching.  No driving for 2 weeks as women can loose a lot of blood during delivery and there is a possibility of being lightheaded/fainting.  No lifting objects heavier than baby for two weeks.  Expect to have vaginal bleeding/spotting for up to six weeks.  The bleeding should get lighter and more white/light brown with time.  For bleeding soaking more than a pad an hour or passing clots greater than the size of your fist, come in to the emergency department.    Follow up in OB office in 1-2 weeks for incision check.  Call for noticeable increase in redness or swelling at incision, discharge from incision, or opening of skin at incision site  Secondary Diagnosis:	Status post bilateral salpingectomy  Secondary Diagnosis:	Mild preeclampsia  Assessment and plan of treatment:	Follow-up in your OB office within 1 week for a blood pressure check.   Please take your blood pressure 3x/day. Call your doctor if your blood pressure is 140 (top number) OR 90 (bottom number) or higher. Return to hospital if your blood pressure is 160 (top number)  (bottom number) or higher. Call your doctor if you experience symptoms such as headache, blurry vision, epigastric pain, or nausea/vomiting.

## 2024-12-04 NOTE — DISCHARGE NOTE OB - PATIENT PORTAL LINK FT
You can access the FollowMyHealth Patient Portal offered by [facility] by registering at the following website: [email or web address]. By joining [employer]’s FollowMyHealth portal, you will also be able to view your health information using other applications (apps) compatible with our system.

## 2024-12-04 NOTE — OB PST NOTE - PROBLEM SELECTOR PLAN 2
Called surgeon's office, spoke with. Called surgeon's office, spoke with RN Lulú, per ELBA Silver, pt is to be evaluated to [facility] L&D.  Pt sent to [facility] via EMS.

## 2024-12-04 NOTE — DISCHARGE NOTE OB - FINANCIAL ASSISTANCE
[facility] provides services at a reduced cost to those who are determined to be eligible through [facility]’s financial assistance program. Information regarding [facility]’s financial assistance program can be found by going to [email or web address] or by calling [phone].

## 2024-12-04 NOTE — DISCHARGE NOTE OB - CARE PROVIDER_API CALL
Mikael De La Cruz  Obstetrics and Gynecology  [address], [address]  [place] [address]  Phone: [phone]  Fax: [phone]  Follow Up Time:

## 2024-12-04 NOTE — ED ADULT TRIAGE NOTE - CHIEF COMPLAINT QUOTE
Patient is 39 weeks pregnant coming from presurgical testing for scheduled  24 and noted to have high blood pressure. Patient denies any pain or further medical complaints at this time, no pregnancy problems. No pertinent medical history. Patient to be taken to L&D as per provider Aracelis.

## 2024-12-04 NOTE — OB PST NOTE - FALL HARM RISK - HARM RISK INTERVENTIONS

## 2024-12-04 NOTE — DISCHARGE NOTE OB - MATERIALS PROVIDED
Vaccinations/[facility]  Screening Program/  Immunization Record/Guide to Postpartum Care/[facility] Hearing Screen Program/Shaken Baby Prevention Handout/Birth Certificate Instructions/Tdap Vaccination (VIS Pub Date: 2012) Vaccinations/[facility]  Screening Program/  Immunization Record/Guide to Postpartum Care/[facility] Hearing Screen Program/Shaken Baby Prevention Handout/Birth Certificate Instructions/MMR Vaccination (VIS Pub Date: 2012)/Tdap Vaccination (VIS Pub Date: 2012)

## 2024-12-04 NOTE — DISCHARGE NOTE OB - MEDICATION SUMMARY - MEDICATIONS TO TAKE
I will START or STAY ON the medications listed below when I get home from the hospital:    ibuprofen 600 mg oral tablet  -- 1 tab(s) by mouth every 6 hours as needed for  moderate pain  -- Indication: For Pain    acetaminophen 325 mg oral tablet  -- 3 tab(s) by mouth every 6 hours as needed for  mild pain  -- Indication: For Pain    PNV Prenatal oral tablet  -- 1 tab(s) orally  -- Indication: For Vitamin    pantoprazole 40 mg oral delayed release tablet  -- 1 tab(s) by mouth once a day before breakfast  -- Indication: For GI reflux

## 2024-12-04 NOTE — OB PST NOTE - NSHPREVIEWOFSYSTEMS_GEN_ALL_CORE
General: No fever, chills, sweating, anorexia, weight loss or weight gain. No polyphagia, polyurea, polydypsia, malaise, or fatigue    Skin: No rashes, itching, or dryness. No change in size/color of moles. No tumors, brittle nails, pitted nails, or hair loss    Breast: No tenderness, lumps, or nipple discharge      Ophthalmologic: No diplopia, photophobia, lacrimation, blurred Vision , or eye discharge    ENMT Symptoms: No hearing difficulty, ear pain, tinnitus, or vertigo. No sinus symptoms, nasal congestion, nasal   discharge, or nasal obstruction    Respiratory and Thorax: No wheezing, dyspnea, cough, hemoptysis, or pleuritic chest pain     Cardiovascular: No chest pain, palpitations, dyspnea on exertion, orthopnea, paroxysmal nocturnal dyspnea,   peripheral edema, or claudication    Gastrointestinal: No nausea, vomiting, diarrhea, constipation, change in bowel habits, flatulence, abdominal pain, or melena    Genitourinary/ Pelvis: No hematuria, renal colic, or flank pain.  No urine discoloration, incontinence, dysuria, or urinary hesitancy. Normal urinary frequency. No nocturia, abnormal vaginal bleeding, vaginal discharge, spotting, pelvic pain, or vaginal leakage    Musculoskeletal: No arthralgia, arthritis, joint swelling, muscle cramping, muscle weakness, neck pain, arm pain, or leg pain    Neurological: No transient paralysis, weakness, paresthesias, or seizures. No syncope, tremors, vertigo, loss of sensation, difficulty walking, loss of consciousness, hemiparesis, confusion, or facial palsy    Psychiatric: +anxiety, last taken medications 4 years ago, reports to be stable.  No suicidal ideation, depression, insomnia, memory loss, paranoia, mood swings, agitation, hallucinations, or hyperactivity    Hematology: No gum bleeding, nose bleeding, or skin lumps    Lymphatic: No enlarged or tender lymph nodes. No extremity swelling    Endocrine: No heat or cold intolerance    Immunologic: No recurrent or persistent infections

## 2024-12-04 NOTE — OB PROVIDER H&P - NSHPPHYSICALEXAM_GEN_ALL_CORE
ICU Vital Signs Last 24 Hrs  T(C): 37.1 (04 Dec 2024 11:14), Max: 37.4 (04 Dec 2024 10:52)  T(F): 98.8 (04 Dec 2024 11:14), Max: 99.4 (04 Dec 2024 10:52)  HR: 81 (04 Dec 2024 12:02) (80 - 93)  BP: 147/83 (04 Dec 2024 12:02) (131/74 - 174/92)    General: Female sitting comfortably   Head: Normocephalic. Atraumatic.   Eyes: No discharge, lids normal, conjunctiva normal  Lungs: No respiratory distress  Abdomen: Soft, nontender. Gravid. Mild contractions on palpation  TAUS:  Sono saved in ASOB.   NST: 150 baseline with moderate variability, accelerations present, no decelerations, reactive  CTX: irregular   Neuro: No facial asymmetry, no slurred speech, moves all 4 extremities  Mood: Alert and lucid, appropriate mood and affect  SVE: 0/0/-3

## 2024-12-04 NOTE — OB NEONATOLOGY/PEDIATRICIAN DELIVERY SUMMARY - NSPEDSNEONOTESA_OBGYN_ALL_OB_FT
Peds called to OR for category 2 tracing of a 39 wk AGA male born via CS to a 35/o [id number].  Maternal medical/surgical/pregnancy history of anxiety. Maternal labs include Blood Type A+, HIV - , RPR NR, , Hep B - , GBS - , Nonimmune to rubella. ROM at time of delivery on  with clear fluids. Baby emerged vigorous, crying, was warmed, dried, suctioned, and stimulated with APGARS of 8/9 . Nuchal x1. Mom plans to initiate formula feed, consents Hep B vaccine and consents circ.  Highest maternal temp: 36.9.    :   TOB:   [id number]    Physical Exam:  Gen: no acute distress, +grimace  HEENT:  anterior fontanel open soft and flat, nondysmorphic facies, no cleft lip/palate, ears normal set, no ear pits or tags, nares clinically patent  Resp: Normal respiratory effort without grunting or retractions, good air entry b/l, clear to auscultation bilaterally  Cardio: Present S1/S2, regular rate and rhythm, no murmurs  Abd: soft, non tender, non distended, umbilical cord with 3 vessels  Neuro: +palmar and plantar grasp, +suck, +ovidio, normal tone  Extremities: negative gallegos and ortolani maneuvers, moving all extremities, no clavicular crepitus or stepoff  Skin: pink, warm  Genitals: Normal male anatomy, testicles palpable in scrotum b/l, Wilbert 1, anus patent

## 2024-12-04 NOTE — DISCHARGE NOTE OB - PLAN OF CARE
routine care After discharge, please stay on pelvic rest for 6 weeks, meaning no sexual intercourse, no tampons and no douching.  No driving for 2 weeks as women can loose a lot of blood during delivery and there is a possibility of being lightheaded/fainting.  No lifting objects heavier than baby for two weeks.  Expect to have vaginal bleeding/spotting for up to six weeks.  The bleeding should get lighter and more white/light brown with time.  For bleeding soaking more than a pad an hour or passing clots greater than the size of your fist, come in to the emergency department.    Follow up in OB office in 1-2 weeks for incision check.  Call for noticeable increase in redness or swelling at incision, discharge from incision, or opening of skin at incision site Follow-up in your OB office within 1 week for a blood pressure check.   Please take your blood pressure 3x/day. Call your doctor if your blood pressure is 140 (top number) OR 90 (bottom number) or higher. Return to hospital if your blood pressure is 160 (top number)  (bottom number) or higher. Call your doctor if you experience symptoms such as headache, blurry vision, epigastric pain, or nausea/vomiting.

## 2024-12-04 NOTE — OB RN INTRAOPERATIVE NOTE - NS_UTERINECLOSURE_OBGYN_ALL_OB_DT
Last 5 Patient Entered Readings                                      Current 30 Day Average: 122/70     Recent Readings 1/11/2018 12/4/2017 11/14/2017 11/3/2017 11/3/2017    SBP (mmHg) 122 144 139 128 159    DBP (mmHg) 70 82 84 83 95    Pulse 73 77 74 67 63        Hypertension Digital Medicine Program (HDMP): Health  Follow Up    Feeling well.    Lifestyle Modifications:    1.Low sodium diet: yes - continuing to monitor sodium intake    2.Physical activity: yes - working out 3x/wk for ~30 mins    Follow up with Mrs. Carolann Finley completed. No further questions or concerns. I will follow up in a few weeks to assess progress.       
Last 5 Patient Entered Readings Current 30 Day Average: 144/82       Units 12/19/2017 12/18/2017 12/17/2017 12/16/2017 12/15/2017    Time - 12:00 AM 12:00 AM 12:00 AM 12:00 AM 12:00 AM    Pulse bpm - - - - -    Steps Walked - [phone]         Hypertension Digital Medicine (HDMP) Health  Follow Up    Unable to LVM to follow up with Ms. Carolann Finley.    Per 30 day average, blood pressure is not well controlled 144/82 mmHg. WCB in 2 weeks.         
Last 5 Patient Entered Readings Current 30 Day Average: 144/82     Recent Readings 12/4/2017 11/14/2017 11/3/2017 11/3/2017 10/27/2017    SBP (mmHg) 144 139 128 159 125    DBP (mmHg) 82 84 83 95 85    Pulse 77 74 67 63 66        Hypertension Digital Medicine (HDMP) Health  Follow Up    Unable to LVM to follow up with Ms. Carolann Finley.    Per 30 day average, blood pressure is not well controlled 144/82 mmHg. WCB in 2 weeks. Sending [employer] Environmental message.        
04-Dec-2024 20:33

## 2024-12-04 NOTE — OB RN DELIVERY SUMMARY - NSSELHIDDEN_OBGYN_ALL_OB_FT
[NS_DeliveryAttending1_OBGYN_ALL_OB_FT:[id number]],[NS_DeliveryAssist1_OBGYN_ALL_OB_FT:[id number]=],[NS_DeliveryRN_OBGYN_ALL_OB_FT:[id number]]

## 2024-12-04 NOTE — OB PROVIDER DELIVERY SUMMARY - NSSELHIDDEN_OBGYN_ALL_OB_FT
[NS_DeliveryAttending1_OBGYN_ALL_OB_FT:[id number]=],[NS_DeliveryAssist1_OBGYN_ALL_OB_FT:[id number]=],[NS_DeliveryRN_OBGYN_ALL_OB_FT:[id number]=]

## 2024-12-04 NOTE — OB RN PATIENT PROFILE - NSSDOHLIVINGSIT_OBGYN_A_OB
Care Transitions Program Week 3 Follow-up Call    Current Issues/Problems reviewed on call:     Patient was in [place] for second opinion. She is waiting to hear from them regarding plan. She state that SOB with activity is sl better. Using O2 at 2L. No cough, feer, N/V or pain. Appetite is good. Sl diarrhea today. Lasix stopped and jardiance, toresemide and spironolactone added to med list. She declined [facility] services. She will make appt with PCP. Writer offered to assist with making appt but she declined.     Details of Interventions/Education completed on call:     Review of Systems:   Care Transition System Evaluation:    General Symptoms: None  Fever: is not present  Temperature:    Pain:  0  Pain Location:    Respiratory Symptoms: Shortness of breath  Shortness of Breath: SOB with mild exertion  Cardiovascular Symptoms: None  Hours of Uninterrupted Sleep:    Sleeping Behaviors:    GI Symptoms: Diarrhea  Abdominal Tenderness:     Symptoms: None  Bowel Ostomy Type:    Genitourinary Symptoms:    Urinary Elimination: Voiding, no difficulities  Feeding Tube Type: None  Skin Symptoms: None  Wound Type: None    The patient istaking all medications as prescribed. The patient did not have questions or concerns regarding the medications prescribed.    Transitional Care Management (TCM) appointment was not completed.  TCM appointment plan of care and upcoming appointments were reviewed with patient.  Transportation to upcoming appointments to be provided by .    Next Care Transitions follow-up call will be within 1 week.    Plan for next follow-up call: last call   I have a steady place to live

## 2024-12-04 NOTE — OB RN DELIVERY SUMMARY - NS_SEPSISRSKCALC_OBGYN_ALL_OB_FT
EOS calculated successfully. EOS Risk Factor: 0.5/1000 live births ([facility] national incidence); GA=39w;Temp=98.8; ROM=0.017; GBS='Negative'; Antibiotics='No antibiotics or any antibiotics < 2 hrs prior to birth'

## 2024-12-05 LAB
ADD ON TEST-SPECIMEN IN LAB: SIGNIFICANT CHANGE UP
ALBUMIN SERPL ELPH-MCNC: 3.1 G/DL — LOW (ref 3.3–5)
ALP SERPL-CCNC: 88 U/L — SIGNIFICANT CHANGE UP (ref 40–120)
ALT FLD-CCNC: 12 U/L — SIGNIFICANT CHANGE UP (ref 4–33)
ANION GAP SERPL CALC-SCNC: 15 MMOL/L — HIGH (ref 7–14)
AST SERPL-CCNC: 25 U/L — SIGNIFICANT CHANGE UP (ref 4–32)
BASOPHILS # BLD AUTO: 0.02 K/UL — SIGNIFICANT CHANGE UP (ref 0–0.2)
BASOPHILS NFR BLD AUTO: 0.1 % — SIGNIFICANT CHANGE UP (ref 0–2)
BILIRUB DIRECT SERPL-MCNC: <0.2 MG/DL — SIGNIFICANT CHANGE UP (ref 0–0.3)
BILIRUB INDIRECT FLD-MCNC: >0.1 MG/DL — SIGNIFICANT CHANGE UP (ref 0–1)
BILIRUB SERPL-MCNC: 0.3 MG/DL — SIGNIFICANT CHANGE UP (ref 0.2–1.2)
BUN SERPL-MCNC: 21 MG/DL — SIGNIFICANT CHANGE UP (ref 7–23)
CALCIUM SERPL-MCNC: 8.6 MG/DL — SIGNIFICANT CHANGE UP (ref 8.4–10.5)
CHLORIDE SERPL-SCNC: 103 MMOL/L — SIGNIFICANT CHANGE UP (ref 98–107)
CO2 SERPL-SCNC: 17 MMOL/L — LOW (ref 22–31)
CREAT SERPL-MCNC: 0.59 MG/DL — SIGNIFICANT CHANGE UP (ref 0.5–1.3)
EGFR: 120 ML/MIN/1.73M2 — SIGNIFICANT CHANGE UP
EOSINOPHIL # BLD AUTO: 0.01 K/UL — SIGNIFICANT CHANGE UP (ref 0–0.5)
EOSINOPHIL NFR BLD AUTO: 0.1 % — SIGNIFICANT CHANGE UP (ref 0–6)
GLUCOSE SERPL-MCNC: 132 MG/DL — HIGH (ref 70–99)
HCT VFR BLD CALC: 33.4 % — LOW (ref 34.5–45)
HGB BLD-MCNC: 10.5 G/DL — LOW (ref 11.5–15.5)
IANC: 12.75 K/UL — HIGH (ref 1.8–7.4)
IMM GRANULOCYTES NFR BLD AUTO: 0.9 % — SIGNIFICANT CHANGE UP (ref 0–0.9)
LYMPHOCYTES # BLD AUTO: 0.83 K/UL — LOW (ref 1–3.3)
LYMPHOCYTES # BLD AUTO: 5.9 % — LOW (ref 13–44)
MCHC RBC-ENTMCNC: 26.4 PG — LOW (ref 27–34)
MCHC RBC-ENTMCNC: 31.4 G/DL — LOW (ref 32–36)
MCV RBC AUTO: 83.9 FL — SIGNIFICANT CHANGE UP (ref 80–100)
MONOCYTES # BLD AUTO: 0.22 K/UL — SIGNIFICANT CHANGE UP (ref 0–0.9)
MONOCYTES NFR BLD AUTO: 1.6 % — LOW (ref 2–14)
NEUTROPHILS # BLD AUTO: 12.75 K/UL — HIGH (ref 1.8–7.4)
NEUTROPHILS NFR BLD AUTO: 91.4 % — HIGH (ref 43–77)
NRBC # BLD: 0 /100 WBCS — SIGNIFICANT CHANGE UP (ref 0–0)
NRBC # FLD: 0 K/UL — SIGNIFICANT CHANGE UP (ref 0–0)
PLATELET # BLD AUTO: 165 K/UL — SIGNIFICANT CHANGE UP (ref 150–400)
POTASSIUM SERPL-MCNC: 4.8 MMOL/L — SIGNIFICANT CHANGE UP (ref 3.5–5.3)
POTASSIUM SERPL-SCNC: 4.8 MMOL/L — SIGNIFICANT CHANGE UP (ref 3.5–5.3)
PROT SERPL-MCNC: 5.7 G/DL — LOW (ref 6–8.3)
RBC # BLD: 3.98 M/UL — SIGNIFICANT CHANGE UP (ref 3.8–5.2)
RBC # FLD: 13.7 % — SIGNIFICANT CHANGE UP (ref 10.3–14.5)
SODIUM SERPL-SCNC: 135 MMOL/L — SIGNIFICANT CHANGE UP (ref 135–145)
WBC # BLD: 13.95 K/UL — HIGH (ref 3.8–10.5)
WBC # FLD AUTO: 13.95 K/UL — HIGH (ref 3.8–10.5)

## 2024-12-05 RX ORDER — 0.9 % SODIUM CHLORIDE 0.9 %
500 INTRAVENOUS SOLUTION INTRAVENOUS ONCE
Refills: 0 | Status: COMPLETED | OUTPATIENT
Start: 2024-12-05 | End: 2024-12-05

## 2024-12-05 RX ORDER — IBUPROFEN 200 MG
600 TABLET ORAL EVERY 6 HOURS
Refills: 0 | Status: DISCONTINUED | OUTPATIENT
Start: 2024-12-05 | End: 2024-12-07

## 2024-12-05 RX ORDER — 0.9 % SODIUM CHLORIDE 0.9 %
1000 INTRAVENOUS SOLUTION INTRAVENOUS ONCE
Refills: 0 | Status: COMPLETED | OUTPATIENT
Start: 2024-12-05 | End: 2024-12-05

## 2024-12-05 RX ORDER — OXYCODONE HYDROCHLORIDE 30 MG/1
5 TABLET ORAL
Refills: 0 | Status: DISCONTINUED | OUTPATIENT
Start: 2024-12-05 | End: 2024-12-07

## 2024-12-05 RX ADMIN — KETOROLAC TROMETHAMINE 30 MILLIGRAM(S): 30 INJECTION INTRAMUSCULAR; INTRAVENOUS at 04:39

## 2024-12-05 RX ADMIN — ACETAMINOPHEN 500MG 975 MILLIGRAM(S): 500 TABLET, COATED ORAL at 16:06

## 2024-12-05 RX ADMIN — ACETAMINOPHEN 500MG 975 MILLIGRAM(S): 500 TABLET, COATED ORAL at 22:00

## 2024-12-05 RX ADMIN — Medication 10000 UNIT(S): at 17:57

## 2024-12-05 RX ADMIN — Medication 1000 MILLILITER(S): at 02:45

## 2024-12-05 RX ADMIN — Medication 1000 MILLILITER(S): at 00:10

## 2024-12-05 RX ADMIN — Medication 10000 UNIT(S): at 05:28

## 2024-12-05 RX ADMIN — Medication 30 MILLILITER(S): at 11:51

## 2024-12-05 RX ADMIN — KETOROLAC TROMETHAMINE 30 MILLIGRAM(S): 30 INJECTION INTRAMUSCULAR; INTRAVENOUS at 04:09

## 2024-12-05 RX ADMIN — KETOROLAC TROMETHAMINE 30 MILLIGRAM(S): 30 INJECTION INTRAMUSCULAR; INTRAVENOUS at 11:50

## 2024-12-05 RX ADMIN — KETOROLAC TROMETHAMINE 30 MILLIGRAM(S): 30 INJECTION INTRAMUSCULAR; INTRAVENOUS at 12:20

## 2024-12-05 RX ADMIN — Medication 80 MILLIGRAM(S): at 11:51

## 2024-12-05 RX ADMIN — ACETAMINOPHEN 500MG 975 MILLIGRAM(S): 500 TABLET, COATED ORAL at 15:36

## 2024-12-05 RX ADMIN — KETOROLAC TROMETHAMINE 30 MILLIGRAM(S): 30 INJECTION INTRAMUSCULAR; INTRAVENOUS at 18:21

## 2024-12-05 RX ADMIN — KETOROLAC TROMETHAMINE 30 MILLIGRAM(S): 30 INJECTION INTRAMUSCULAR; INTRAVENOUS at 17:51

## 2024-12-05 RX ADMIN — OXYCODONE HYDROCHLORIDE 5 MILLIGRAM(S): 30 TABLET ORAL at 21:09

## 2024-12-05 RX ADMIN — ACETAMINOPHEN 500MG 975 MILLIGRAM(S): 500 TABLET, COATED ORAL at 08:28

## 2024-12-05 RX ADMIN — OXYCODONE HYDROCHLORIDE 5 MILLIGRAM(S): 30 TABLET ORAL at 22:00

## 2024-12-05 RX ADMIN — ACETAMINOPHEN 500MG 975 MILLIGRAM(S): 500 TABLET, COATED ORAL at 07:58

## 2024-12-05 RX ADMIN — ACETAMINOPHEN 500MG 975 MILLIGRAM(S): 500 TABLET, COATED ORAL at 00:17

## 2024-12-05 RX ADMIN — ACETAMINOPHEN 500MG 975 MILLIGRAM(S): 500 TABLET, COATED ORAL at 21:09

## 2024-12-05 NOTE — CHART NOTE - NSCHARTNOTEFT_GEN_A_CORE
ANESTHESIA POSTOP CHECK    34y Female POSTOP DAY 1     Vital Signs Last 24 Hrs  T(C): 37.3 (05 Dec 2024 06:00), Max: 37.4 (04 Dec 2024 10:52)  T(F): 99.1 (05 Dec 2024 06:00), Max: 99.4 (04 Dec 2024 10:52)  HR: 73 (05 Dec 2024 06:00) (57 - 96)  BP: 127/63 (05 Dec 2024 06:00) (103/71 - 174/92)  BP(mean): 104 (05 Dec 2024 05:30) (81 - 106)  RR: 18 (05 Dec 2024 06:00) (11 - 25)  SpO2: 97% (05 Dec 2024 06:00) (94% - 100%)      I&O's Summary    04 Dec 2024 07:01  -  05 Dec 2024 07:00  --------------------------------------------------------  IN: 2975 mL / OUT: 852 mL / NET: 2123 mL        [X ] NO APPARENT ANESTHESIA COMPLICATIONS      Comments:

## 2024-12-05 NOTE — PROGRESS NOTE ADULT - SUBJECTIVE AND OBJECTIVE BOX
35yo POD#1 s/p LTCS . Her pain is well controlled. She is tolerating a regular diet and passing flatus. Denies N/V. Denies CP/SOB/lightheadedness/dizziness.   She is ambulating without difficulty.   Voiding spontaneously.     O:   Vital Signs Last 24 Hrs  T(C): 36.8 (04 Dec 2024 21:40), Max: 37.4 (04 Dec 2024 10:52)  T(F): 98.2 (04 Dec 2024 21:40), Max: 99.4 (04 Dec 2024 10:52)  HR: 71 (05 Dec 2024 05:00) (57 - 96)  BP: 147/90 (05 Dec 2024 05:00) (103/71 - 174/92)  BP(mean): 106 (05 Dec 2024 05:00) (81 - 106)  RR: 19 (05 Dec 2024 05:00) (11 - 24)  SpO2: 100% (05 Dec 2024 05:00) (94% - 100%)        MEDICATIONS  (STANDING):  acetaminophen     Tablet .. 975 milliGRAM(s) Oral <User Schedule>  diphtheria/tetanus/pertussis (acellular) Vaccine (Adacel) 0.5 milliLiter(s) IntraMuscular once  heparin   Injectable 26679 Unit(s) SubCutaneous every 12 hours  ibuprofen  Tablet. 600 milliGRAM(s) Oral every 6 hours  ketorolac   Injectable 30 milliGRAM(s) IV Push every 6 hours  lactated ringers. 1000 milliLiter(s) (75 mL/Hr) IV Continuous <Continuous>  morphine PF Spinal 0.1 milliGRAM(s) IntraThecal. once  oxytocin Infusion 42 milliUNIT(s)/Min (42 mL/Hr) IV Continuous <Continuous>      MEDICATIONS  (PRN):  dexAMETHasone  Injectable 4 milliGRAM(s) IV Push every 6 hours PRN Nausea  diphenhydrAMINE 25 milliGRAM(s) Oral every 6 hours PRN Pruritus  lanolin Ointment 1 Application(s) Topical every 6 hours PRN Sore Nipples  magnesium hydroxide Suspension 30 milliLiter(s) Oral two times a day PRN Constipation  nalbuphine Injectable 2.5 milliGRAM(s) IV Push every 6 hours PRN Pruritus  naloxone Injectable 0.1 milliGRAM(s) IV Push every 3 minutes PRN For ANY of the following changes in patient status:  A. Breaths Per Minute LESS THAN 10, B. Oxygen saturation LESS THAN 90%, C. Sedation score of 6 for Stop After: 4 Times  ondansetron Injectable 4 milliGRAM(s) IV Push every 6 hours PRN Nausea  oxyCODONE    IR 5 milliGRAM(s) Oral every 3 hours PRN Mild Pain (1 - 3)  oxyCODONE    IR 5 milliGRAM(s) Oral every 3 hours PRN Moderate to Severe Pain (4-10)  oxyCODONE    IR 5 milliGRAM(s) Oral once PRN Moderate to Severe Pain (4-10)  simethicone 80 milliGRAM(s) Chew every 4 hours PRN Gas        Labs:  Blood type: A Positive  Rubella IgG: RPR: Negative                          10.5[L]   13.95[H] >-----------< 165    ( 12-05 @ 03:44 )             33.4[L]                        11.9   9.13 >-----------< 170    ( 12-04 @ 11:30 )             36.8                        11.4[L]   9.01 >-----------< 171    ( 12-04 @ 09:40 )             34.2[L]    12-05-24 @ 03:44      135  |  103  |  21  ----------------------------<  132[H]  4.8   |  17[L]  |  0.59    12-04-24 @ 11:30      138  |  105  |  18  ----------------------------<  80  4.1   |  21[L]  |  0.54    12-04-24 @ 09:40      137  |  105  |  18  ----------------------------<  76  4.3   |  20[L]  |  0.60        Ca    8.6      05 Dec 2024 03:44  Ca    9.0      04 Dec 2024 11:30  Ca    9.2      04 Dec 2024 09:40    TPro  6.1  /  Alb  3.4  /  TBili  0.3  /  DBili  x   /  AST  14  /  ALT  12  /  AlkPhos  94  12-04-24 @ 11:30  TPro  5.7[L]  /  Alb  3.3  /  TBili  0.3  /  DBili  x   /  AST  14  /  ALT  13  /  AlkPhos  98  12-04-24 @ 09:40          PE:  General: NAD  Abdomen: Mildly distended, appropriately tender, incision c/d/i.  Extremities: No erythema, no pitting edema    A/P: 35yo POD#1 s/p LTCS.  Patient is stable and doing well post-operatively.      #Postop from LTCS  - Continue regular diet.  - Increase ambulation.  - Continue current pain regimen  - F/u AM CBC    Nora Mora, PGY-1 33yo POD#1 s/p rLTCS +BS complicated by PEC. Her pain is well controlled. She has only had broth since delivery but will try for breakfast. She is passing flatus. Denies N/V. Denies CP/SOB/lightheadedness/dizziness. Has not ambulated since delivery. Pt with low urine output since OR, s/p 1.5L bolus with improvement to 45cc/hr most recently. Pt PO hydrating.     O:   Vital Signs Last 24 Hrs  T(C): 36.8 (04 Dec 2024 21:40), Max: 37.4 (04 Dec 2024 10:52)  T(F): 98.2 (04 Dec 2024 21:40), Max: 99.4 (04 Dec 2024 10:52)  HR: 71 (05 Dec 2024 05:00) (57 - 96)  BP: 147/90 (05 Dec 2024 05:00) (103/71 - 174/92)  BP(mean): 106 (05 Dec 2024 05:00) (81 - 106)  RR: 19 (05 Dec 2024 05:00) (11 - 24)  SpO2: 100% (05 Dec 2024 05:00) (94% - 100%)        MEDICATIONS  (STANDING):  acetaminophen     Tablet .. 975 milliGRAM(s) Oral <User Schedule>  diphtheria/tetanus/pertussis (acellular) Vaccine (Adacel) 0.5 milliLiter(s) IntraMuscular once  heparin   Injectable 87018 Unit(s) SubCutaneous every 12 hours  ibuprofen  Tablet. 600 milliGRAM(s) Oral every 6 hours  ketorolac   Injectable 30 milliGRAM(s) IV Push every 6 hours  lactated ringers. 1000 milliLiter(s) (75 mL/Hr) IV Continuous <Continuous>  morphine PF Spinal 0.1 milliGRAM(s) IntraThecal. once  oxytocin Infusion 42 milliUNIT(s)/Min (42 mL/Hr) IV Continuous <Continuous>      MEDICATIONS  (PRN):  dexAMETHasone  Injectable 4 milliGRAM(s) IV Push every 6 hours PRN Nausea  diphenhydrAMINE 25 milliGRAM(s) Oral every 6 hours PRN Pruritus  lanolin Ointment 1 Application(s) Topical every 6 hours PRN Sore Nipples  magnesium hydroxide Suspension 30 milliLiter(s) Oral two times a day PRN Constipation  nalbuphine Injectable 2.5 milliGRAM(s) IV Push every 6 hours PRN Pruritus  naloxone Injectable 0.1 milliGRAM(s) IV Push every 3 minutes PRN For ANY of the following changes in patient status:  A. Breaths Per Minute LESS THAN 10, B. Oxygen saturation LESS THAN 90%, C. Sedation score of 6 for Stop After: 4 Times  ondansetron Injectable 4 milliGRAM(s) IV Push every 6 hours PRN Nausea  oxyCODONE    IR 5 milliGRAM(s) Oral every 3 hours PRN Mild Pain (1 - 3)  oxyCODONE    IR 5 milliGRAM(s) Oral every 3 hours PRN Moderate to Severe Pain (4-10)  oxyCODONE    IR 5 milliGRAM(s) Oral once PRN Moderate to Severe Pain (4-10)  simethicone 80 milliGRAM(s) Chew every 4 hours PRN Gas        Labs:  Blood type: A Positive  Rubella IgG: RPR: Negative                          10.5[L]   13.95[H] >-----------< 165    ( 12-05 @ 03:44 )             33.4[L]                        11.9   9.13 >-----------< 170    ( 12-04 @ 11:30 )             36.8                        11.4[L]   9.01 >-----------< 171    ( 12-04 @ 09:40 )             34.2[L]    12-05-24 @ 03:44      135  |  103  |  21  ----------------------------<  132[H]  4.8   |  17[L]  |  0.59    12-04-24 @ 11:30      138  |  105  |  18  ----------------------------<  80  4.1   |  21[L]  |  0.54    12-04-24 @ 09:40      137  |  105  |  18  ----------------------------<  76  4.3   |  20[L]  |  0.60        Ca    8.6      05 Dec 2024 03:44  Ca    9.0      04 Dec 2024 11:30  Ca    9.2      04 Dec 2024 09:40    TPro  6.1  /  Alb  3.4  /  TBili  0.3  /  DBili  x   /  AST  14  /  ALT  12  /  AlkPhos  94  12-04-24 @ 11:30  TPro  5.7[L]  /  Alb  3.3  /  TBili  0.3  /  DBili  x   /  AST  14  /  ALT  13  /  AlkPhos  98  12-04-24 @ 09:40          PE:  General: NAD  Abdomen: Mildly distended, appropriately tender, incision c/d/i with aquacel   : [place] draining yellow urine   Extremities: No erythema, no pitting edema

## 2024-12-05 NOTE — PROGRESS NOTE ADULT - SUBJECTIVE AND OBJECTIVE BOX
Pt complains of expected incisional pain  Vital Signs Last 24 Hrs  T(C): 37.1 (05 Dec 2024 09:40), Max: 37.3 (05 Dec 2024 06:00)  T(F): 98.8 (05 Dec 2024 09:40), Max: 99.1 (05 Dec 2024 06:00)  HR: 76 (05 Dec 2024 09:40) (57 - 96)  BP: 127/63 (05 Dec 2024 06:00) (103/71 - 150/84)  BP(mean): 104 (05 Dec 2024 05:30) (81 - 106)  RR: 20 (05 Dec 2024 09:40) (11 - 25)  SpO2: 98% (05 Dec 2024 09:40) (94% - 100%)    Parameters below as of 05 Dec 2024 09:40  Patient On (Oxygen Delivery Method): room air    Blood Type: A Positive                        10.5   13.95 )-----------( 165      ( 05 Dec 2024 03:44 )             33.4     abdomen - soft, appropriately tender, non-distended  incision - dressing dry and in place  extremeties - nontender    s/p repeat c/s with BS 12/4  preeclampsia without severe features  doing well - follow BPs  routine post op care  encouraged ambulation

## 2024-12-05 NOTE — PROVIDER CONTACT NOTE (OTHER) - SITUATION
patient has not meet urine output goal 68cc. last urine output 45 cc for 0500 and 40 cc for 0400. patient last recent Bp have been labile 147/90 Vaginal Delivery

## 2024-12-05 NOTE — PROGRESS NOTE ADULT - SUBJECTIVE AND OBJECTIVE BOX
Postop Day  __1_ s/p   C- Section    THERAPY:  [x  ] Spinal morphine   [  ] Epidural morphine   [  ] IV PCA Hydromorphone 1 mg/ml    acetaminophen     Tablet .. 975 milliGRAM(s) Oral <User Schedule>  dexAMETHasone  Injectable 4 milliGRAM(s) IV Push every 6 hours PRN  diphenhydrAMINE 25 milliGRAM(s) Oral every 6 hours PRN  diphtheria/tetanus/pertussis (acellular) Vaccine (Adacel) 0.5 milliLiter(s) IntraMuscular once  heparin   Injectable 48456 Unit(s) SubCutaneous every 12 hours  ibuprofen  Tablet. 600 milliGRAM(s) Oral every 6 hours  ketorolac   Injectable 30 milliGRAM(s) IV Push every 6 hours  lactated ringers. 1000 milliLiter(s) IV Continuous <Continuous>  lanolin Ointment 1 Application(s) Topical every 6 hours PRN  magnesium hydroxide Suspension 30 milliLiter(s) Oral two times a day PRN  morphine PF Spinal 0.1 milliGRAM(s) IntraThecal. once  nalbuphine Injectable 2.5 milliGRAM(s) IV Push every 6 hours PRN  naloxone Injectable 0.1 milliGRAM(s) IV Push every 3 minutes PRN  ondansetron Injectable 4 milliGRAM(s) IV Push every 6 hours PRN  oxyCODONE    IR 5 milliGRAM(s) Oral every 3 hours PRN  oxyCODONE    IR 5 milliGRAM(s) Oral every 3 hours PRN  oxyCODONE    IR 5 milliGRAM(s) Oral once PRN  oxytocin Infusion 42 milliUNIT(s)/Min IV Continuous <Continuous>  simethicone 80 milliGRAM(s) Chew every 4 hours PRN      T(C): 37.3 (12-05-24 @ 06:00), Max: 37.4 (12-04-24 @ 10:52)  HR: 73 (12-05-24 @ 06:00) (57 - 96)  BP: 127/63 (12-05-24 @ 06:00) (103/71 - 174/92)  RR: 18 (12-05-24 @ 06:00) (11 - 25)  SpO2: 97% (12-05-24 @ 06:00) (94% - 100%)    Pain:   ______mild_____ at rest;  _____moderate______with activity    Sedation Score:	  [ x ] Alert	    [  ] Drowsy        [  ] Arousable	[  ] Asleep	[  ] Unresponsive    Side Effects:	  [  ] None	     [  ] Nausea        [  ] Pruritus        [  ] Weakness   [  ] Numbness        ASSESSMENT/ PLAN  [   ] Side effects resolving      [   ] Patient made aware of PRN meds available     [ x] Discontinue & switch to PRN pain medications        [  ] Continue       Patient states lower extremities feel and move normally. No apparent anesthetic complications.

## 2024-12-05 NOTE — PROVIDER CONTACT NOTE (OTHER) - BACKGROUND
rpt  with BTL, , light to moderate bleeding, uterus firm and @. 1L bolus given @ 0010 and 500 cc bolus given @ 025, Labs sent @1762

## 2024-12-06 ENCOUNTER — APPOINTMENT (OUTPATIENT)
Dept: ANTEPARTUM | Facility: CLINIC | Age: 34
End: 2024-12-06

## 2024-12-06 ENCOUNTER — NON-APPOINTMENT (OUTPATIENT)
Age: 34
End: 2024-12-06

## 2024-12-06 PROBLEM — Z34.90 ENCOUNTER FOR SUPERVISION OF NORMAL PREGNANCY, UNSPECIFIED, UNSPECIFIED TRIMESTER: Chronic | Status: ACTIVE | Noted: 2024-12-04

## 2024-12-06 RX ORDER — TETANUS TOXOID, REDUCED DIPHTHERIA TOXOID AND ACELLULAR PERTUSSIS VACCINE, ADSORBED 5; 2.5; 8; 8; 2.5 [IU]/.5ML; [IU]/.5ML; UG/.5ML; UG/.5ML; UG/.5ML
0.5 SUSPENSION INTRAMUSCULAR ONCE
Refills: 0 | Status: DISCONTINUED | OUTPATIENT
Start: 2024-12-06 | End: 2024-12-06

## 2024-12-06 RX ORDER — PANTOPRAZOLE SODIUM 40 MG/1
40 TABLET, DELAYED RELEASE ORAL
Refills: 0 | Status: DISCONTINUED | OUTPATIENT
Start: 2024-12-06 | End: 2024-12-07

## 2024-12-06 RX ORDER — PANTOPRAZOLE SODIUM 40 MG/1
1 TABLET, DELAYED RELEASE ORAL
Qty: 0 | Refills: 0 | DISCHARGE
Start: 2024-12-06

## 2024-12-06 RX ORDER — PANTOPRAZOLE SODIUM 40 MG/1
1 TABLET, DELAYED RELEASE ORAL
Refills: 0 | DISCHARGE

## 2024-12-06 RX ORDER — ACETAMINOPHEN 500MG 500 MG/1
3 TABLET, COATED ORAL
Qty: 0 | Refills: 0 | DISCHARGE
Start: 2024-12-06

## 2024-12-06 RX ORDER — IBUPROFEN 200 MG
1 TABLET ORAL
Qty: 0 | Refills: 0 | DISCHARGE
Start: 2024-12-06

## 2024-12-06 RX ADMIN — OXYCODONE HYDROCHLORIDE 5 MILLIGRAM(S): 30 TABLET ORAL at 15:30

## 2024-12-06 RX ADMIN — ACETAMINOPHEN 500MG 975 MILLIGRAM(S): 500 TABLET, COATED ORAL at 16:25

## 2024-12-06 RX ADMIN — PANTOPRAZOLE SODIUM 40 MILLIGRAM(S): 40 TABLET, DELAYED RELEASE ORAL at 05:43

## 2024-12-06 RX ADMIN — Medication 10000 UNIT(S): at 05:43

## 2024-12-06 RX ADMIN — ACETAMINOPHEN 500MG 975 MILLIGRAM(S): 500 TABLET, COATED ORAL at 20:09

## 2024-12-06 RX ADMIN — ACETAMINOPHEN 500MG 975 MILLIGRAM(S): 500 TABLET, COATED ORAL at 15:57

## 2024-12-06 RX ADMIN — Medication 600 MILLIGRAM(S): at 06:16

## 2024-12-06 RX ADMIN — Medication 600 MILLIGRAM(S): at 00:00

## 2024-12-06 RX ADMIN — Medication 600 MILLIGRAM(S): at 12:30

## 2024-12-06 RX ADMIN — Medication 10000 UNIT(S): at 18:01

## 2024-12-06 RX ADMIN — ACETAMINOPHEN 500MG 975 MILLIGRAM(S): 500 TABLET, COATED ORAL at 03:16

## 2024-12-06 RX ADMIN — Medication 600 MILLIGRAM(S): at 12:00

## 2024-12-06 RX ADMIN — ACETAMINOPHEN 500MG 975 MILLIGRAM(S): 500 TABLET, COATED ORAL at 08:51

## 2024-12-06 RX ADMIN — Medication 80 MILLIGRAM(S): at 02:39

## 2024-12-06 RX ADMIN — ACETAMINOPHEN 500MG 975 MILLIGRAM(S): 500 TABLET, COATED ORAL at 09:21

## 2024-12-06 RX ADMIN — Medication 600 MILLIGRAM(S): at 18:00

## 2024-12-06 RX ADMIN — Medication 600 MILLIGRAM(S): at 00:55

## 2024-12-06 RX ADMIN — OXYCODONE HYDROCHLORIDE 5 MILLIGRAM(S): 30 TABLET ORAL at 16:00

## 2024-12-06 RX ADMIN — ACETAMINOPHEN 500MG 975 MILLIGRAM(S): 500 TABLET, COATED ORAL at 02:36

## 2024-12-06 RX ADMIN — ACETAMINOPHEN 500MG 975 MILLIGRAM(S): 500 TABLET, COATED ORAL at 20:57

## 2024-12-06 RX ADMIN — Medication 600 MILLIGRAM(S): at 05:43

## 2024-12-06 RX ADMIN — Medication 600 MILLIGRAM(S): at 18:30

## 2024-12-06 NOTE — PROGRESS NOTE ADULT - SUBJECTIVE AND OBJECTIVE BOX
OB Progress Note: LTCS, POD#2    S: 35yo POD#2 s/p LTCS+BS. Pain well controlled, tolerating regular diet, passing faltus, voiding spontaneously, ambulating without difficulty. Denies heavy vaginal bleeding, CP/SOB, lightheadedness/dizziness, nausea/vomiting,     O:  Vitals:  Vital Signs Last 24 Hrs  T(C): 36.6 (06 Dec 2024 06:00), Max: 37.1 (05 Dec 2024 09:40)  T(F): 97.8 (06 Dec 2024 06:00), Max: 98.8 (05 Dec 2024 09:40)  HR: 82 (06 Dec 2024 06:00) (70 - 91)  BP: 139/82 (06 Dec 2024 06:00) (113/63 - 139/82)  BP(mean): --  RR: 19 (06 Dec 2024 06:00) (18 - 20)  SpO2: 100% (06 Dec 2024 06:00) (98% - 100%)    Parameters below as of 05 Dec 2024 22:00  Patient On (Oxygen Delivery Method): room air        MEDICATIONS  (STANDING):  acetaminophen     Tablet .. 975 milliGRAM(s) Oral <User Schedule>  diphtheria/tetanus/pertussis (acellular) Vaccine (Adacel) 0.5 milliLiter(s) IntraMuscular once  heparin   Injectable 54383 Unit(s) SubCutaneous every 12 hours  ibuprofen  Tablet. 600 milliGRAM(s) Oral every 6 hours  pantoprazole    Tablet 40 milliGRAM(s) Oral before breakfast      MEDICATIONS  (PRN):  diphenhydrAMINE 25 milliGRAM(s) Oral every 6 hours PRN Pruritus  lanolin Ointment 1 Application(s) Topical every 6 hours PRN Sore Nipples  magnesium hydroxide Suspension 30 milliLiter(s) Oral two times a day PRN Constipation  oxyCODONE    IR 5 milliGRAM(s) Oral once PRN Moderate to Severe Pain (4-10)  oxyCODONE    IR 5 milliGRAM(s) Oral every 3 hours PRN Moderate to Severe Pain (4-10)  simethicone 80 milliGRAM(s) Chew every 4 hours PRN Gas      Labs:  Blood type: A Positive  Rubella IgG: RPR: Negative                          10.5[L]   13.95[H] >-----------< 165    ( 12-05 @ 03:44 )             33.4[L]                        11.9   9.13 >-----------< 170    ( 12-04 @ 11:30 )             36.8                        11.4[L]   9.01 >-----------< 171    ( 12-04 @ 09:40 )             34.2[L]    12-05-24 @ 03:44      135  |  103  |  21  ----------------------------<  132[H]  4.8   |  17[L]  |  0.59    12-04-24 @ 11:30      138  |  105  |  18  ----------------------------<  80  4.1   |  21[L]  |  0.54    12-04-24 @ 09:40      137  |  105  |  18  ----------------------------<  76  4.3   |  20[L]  |  0.60        Ca    8.6      05 Dec 2024 03:44  Ca    9.0      04 Dec 2024 11:30  Ca    9.2      04 Dec 2024 09:40    TPro  5.7[L]  /  Alb  3.1[L]  /  TBili  0.3  /  DBili  <0.2  /  AST  25  /  ALT  12  /  AlkPhos  88  12-05-24 @ 03:44  TPro  6.1  /  Alb  3.4  /  TBili  0.3  /  DBili  x   /  AST  14  /  ALT  12  /  AlkPhos  94  12-04-24 @ 11:30  TPro  5.7[L]  /  Alb  3.3  /  TBili  0.3  /  DBili  x   /  AST  14  /  ALT  13  /  AlkPhos  98  12-04-24 @ 09:40          PE:  General: NAD  CV: RRR   Resp: CTAB  Abdomen: Soft, appropriately tender, Fundus firm incision c/d/i.  : Nl lochia  Extremities: No erythema, no pitting edema

## 2024-12-07 VITALS
SYSTOLIC BLOOD PRESSURE: 139 MMHG | TEMPERATURE: 98 F | RESPIRATION RATE: 18 BRPM | OXYGEN SATURATION: 100 % | HEART RATE: 94 BPM | DIASTOLIC BLOOD PRESSURE: 80 MMHG

## 2024-12-07 RX ORDER — MEASLES,MUMPS,RUBELLA VACC/PF 12500/0.5
0.5 VIAL (EA) SUBCUTANEOUS ONCE
Refills: 0 | Status: COMPLETED | OUTPATIENT
Start: 2024-12-07 | End: 2024-12-07

## 2024-12-07 RX ADMIN — Medication 600 MILLIGRAM(S): at 06:10

## 2024-12-07 RX ADMIN — ACETAMINOPHEN 500MG 975 MILLIGRAM(S): 500 TABLET, COATED ORAL at 08:15

## 2024-12-07 RX ADMIN — Medication 600 MILLIGRAM(S): at 05:37

## 2024-12-07 RX ADMIN — ACETAMINOPHEN 500MG 975 MILLIGRAM(S): 500 TABLET, COATED ORAL at 02:23

## 2024-12-07 RX ADMIN — Medication 10000 UNIT(S): at 05:38

## 2024-12-07 RX ADMIN — ACETAMINOPHEN 500MG 975 MILLIGRAM(S): 500 TABLET, COATED ORAL at 02:53

## 2024-12-07 RX ADMIN — PANTOPRAZOLE SODIUM 40 MILLIGRAM(S): 40 TABLET, DELAYED RELEASE ORAL at 06:08

## 2024-12-07 RX ADMIN — Medication 600 MILLIGRAM(S): at 00:09

## 2024-12-07 RX ADMIN — Medication 0.5 MILLILITER(S): at 09:51

## 2024-12-07 RX ADMIN — ACETAMINOPHEN 500MG 975 MILLIGRAM(S): 500 TABLET, COATED ORAL at 08:45

## 2024-12-07 NOTE — PROGRESS NOTE ADULT - SUBJECTIVE AND OBJECTIVE BOX
SUBJECTIVE:    Pain: Controlled    Complaints: None    MILESTONES:    Alert and Oriented x 3  [ x ]  Out of bed/ ambulating. [ x ]  Flatus:   Positive [ x ]  Negative [  ]  Bowel movement  [ X ] Positive [  ] Negative   Voiding [x  ] Due to void [  ]   Magallon/Indwelling catheter in place [  ]  Diet: Regular [ x ]  Clears [  ]  NPO [  ]    Infant feeding:  Breast [  ]   Bottle [ X ]  Both [  ]  Feeding related issues and/or concerns:      OBJECTIVE:  T(C): 36.6 (24 @ 05:58), Max: 36.9 (24 @ 14:07)  HR: 91 (24 @ 05:58) (80 - 92)  BP: 138/73 (24 @ 05:58) (124/78 - 138/81)  RR: 18 (24 @ 05:58) (17 - 19)  SpO2: 99% (24 @ 05:58) (98% - 100%)  Wt(kg): --          Blood Type: A Positive    RPR: Negative          MEDICATIONS  (STANDING):  acetaminophen     Tablet .. 975 milliGRAM(s) Oral <User Schedule>  heparin   Injectable 67232 Unit(s) SubCutaneous every 12 hours  ibuprofen  Tablet. 600 milliGRAM(s) Oral every 6 hours  pantoprazole    Tablet 40 milliGRAM(s) Oral before breakfast    MEDICATIONS  (PRN):  diphenhydrAMINE 25 milliGRAM(s) Oral every 6 hours PRN Pruritus  lanolin Ointment 1 Application(s) Topical every 6 hours PRN Sore Nipples  magnesium hydroxide Suspension 30 milliLiter(s) Oral two times a day PRN Constipation  oxyCODONE    IR 5 milliGRAM(s) Oral once PRN Moderate to Severe Pain (4-10)  oxyCODONE    IR 5 milliGRAM(s) Oral every 3 hours PRN Moderate to Severe Pain (4-10)  simethicone 80 milliGRAM(s) Chew every 4 hours PRN Gas        ASSESSMENT:    34y     G  4    P 3013        PO Day#  3        Delivery: Primary [  ]    Repeat [ X ]    QBL - 557   PEC, HELLP - wnl                                  Indication of procedure: PEC    Condition: Stable    Past Medical History significant for: HPI:  's patient is a 33 y/o EDC 2024 EGA 39  presents from ED with blood pressure 174/92. Patient sent from presurgical testing by ambulance for blood pressures : 145/96 and 154/98 as per patient's memory. Patient denies headache, nausea, vomiting, visual disturbances, right upper quadrant pain, epigastric pain. Patient reports of fetal movement. Denies loss of fluid, vaginal bleeding, cramping, contractions.     Antepartum History:  -Scheduled for repeat c/s 2024  -Denies history of hypertension prior to pregnancy, in pregnancy or previous pregnancies   - BMI 45.6 (04 Dec 2024 12:06)      Current Issues:    Breasts:  Soft [x  ]   Engorged [  ]   A good tight support bra is suggested as she is bottle feeding only.  Nipples:  Abdomen: Soft [ x ]   Distended [  ] Nontender [  ]     Bowel sounds :  Present [  ]  Absent [  ]   Fundus:  Firm [x  ]  Boggy [  ]    Abdominal incision: Clean, Dry and Intact [x  ]  Staples [  ] Steri Strips [  ] Dermabond [  ] Sutures [  ]   Aquacel [  X  ]    Patient wearing abdominal binder for support.    Vaginal: Lochia:  Heavy [  ]  Moderate [ x ]   Scant [  ]    Extremities: Edema [ X ] Negative Ana Laura's Sign [ X ] Nontender Freddie  [ x ] Positive pedal pulses [  ]    Other relevant physical exam findings: The patient was reminded that the Aquacel Dressing has to be removed on PO Day #7. She stated that she would be returning to the office for this.      PLAN:    Plan: Increase ambulation, analgesia PRN and pain medication protocol standing oxycodone, ibuprofen and acetaminophen.    Diet: Regular diet    PEC - The patient stated that she  has a Blood Pressure Monitor at home . She is aware of the criteria for monitoring and reporting her B/P's to her OB.     Continue routine post-operative and postpartum care.     Discharge Planning [ x ]    For Discharge Today  [  X  ]    Consults:  Social Work [  ]  Lactation [ x ]  Other [         ]    SUBJECTIVE:    Pain: Controlled    Complaints: None    MILESTONES:    Alert and Oriented x 3  [ x ]  Out of bed/ ambulating. [ x ]  Flatus:   Positive [ x ]  Negative [  ]  Bowel movement  [ X ] Positive [  ] Negative   Voiding [x  ] Due to void [  ]   Magallon/Indwelling catheter in place [  ]  Diet: Regular [ x ]  Clears [  ]  NPO [  ]    Infant feeding:  Breast [  ]   Bottle [ X ]  Both [  ]  Feeding related issues and/or concerns:      OBJECTIVE:  T(C): 36.6 (24 @ 05:58), Max: 36.9 (24 @ 14:07)  HR: 91 (24 @ 05:58) (80 - 92)  BP: 138/73 (24 @ 05:58) (124/78 - 138/81)  RR: 18 (24 @ 05:58) (17 - 19)  SpO2: 99% (24 @ 05:58) (98% - 100%)  Wt(kg): --          Blood Type: A Positive    RPR: Negative          MEDICATIONS  (STANDING):  acetaminophen     Tablet .. 975 milliGRAM(s) Oral <User Schedule>  heparin   Injectable 49566 Unit(s) SubCutaneous every 12 hours  ibuprofen  Tablet. 600 milliGRAM(s) Oral every 6 hours  pantoprazole    Tablet 40 milliGRAM(s) Oral before breakfast    MEDICATIONS  (PRN):  diphenhydrAMINE 25 milliGRAM(s) Oral every 6 hours PRN Pruritus  lanolin Ointment 1 Application(s) Topical every 6 hours PRN Sore Nipples  magnesium hydroxide Suspension 30 milliLiter(s) Oral two times a day PRN Constipation  oxyCODONE    IR 5 milliGRAM(s) Oral once PRN Moderate to Severe Pain (4-10)  oxyCODONE    IR 5 milliGRAM(s) Oral every 3 hours PRN Moderate to Severe Pain (4-10)  simethicone 80 milliGRAM(s) Chew every 4 hours PRN Gas        ASSESSMENT:    34y     G  4    P 3013        PO Day#  3        Delivery: Primary [  ]    Repeat [ X ]    QBL - 557   PEC, HELLP - wnl                                  Indication of procedure: PEC    Condition: Stable    Past Medical History significant for: HPI:  's patient is a 33 y/o EDC 2024 EGA 39  presents from ED with blood pressure 174/92. Patient sent from presurgical testing by ambulance for blood pressures : 145/96 and 154/98 as per patient's memory. Patient denies headache, nausea, vomiting, visual disturbances, right upper quadrant pain, epigastric pain. Patient reports of fetal movement. Denies loss of fluid, vaginal bleeding, cramping, contractions.     Antepartum History:  -Scheduled for repeat c/s 2024  -Denies history of hypertension prior to pregnancy, in pregnancy or previous pregnancies   - BMI 45.6 (04 Dec 2024 12:06)      Current Issues:    Breasts:  Soft [x  ]   Engorged [  ]   A good tight support bra is suggested as she is bottle feeding only.  Nipples:  Abdomen: Soft [ x ]   Distended [  ] Nontender [  ]     Bowel sounds :  Present [  ]  Absent [  ]   Fundus:  Firm [x  ]  Boggy [  ]    Abdominal incision: Clean, Dry and Intact [x  ]  Staples [  ] Steri Strips [  ] Dermabond [  ] Sutures [  ]   Aquacel [  X  ]    Patient wearing abdominal binder for support.    Vaginal: Lochia:  Heavy [  ]  Moderate [ x ]   Scant [  ]    Extremities: Edema [ X ] Negative Ana Laura's Sign [ X ] Nontender Freddie  [ x ] Positive pedal pulses [  ]    Other relevant physical exam findings: The patient was reminded that the Aquacel Dressing has to be removed on PO Day #7. She stated that she would be returning to the office for this.      PLAN:    Plan: Increase ambulation, analgesia PRN and pain medication protocol standing oxycodone, ibuprofen and acetaminophen.    Diet: Regular diet    PEC - The patient stated that she  has a Blood Pressure Monitor at home . She is aware of the criteria for monitoring and reporting her B/P's to her OB.     Continue routine post-operative and postpartum care.     Discharge Planning [ x ]    For Discharge Today  [  X  ]    Consults:  Social Work [ X ]  Lactation [ x ]  Other [         ]

## 2024-12-07 NOTE — PROGRESS NOTE ADULT - ASSESSMENT
A/P: 33yo POD#3 s/p LTCS.  Patient is stable and is doing well post-operatively.  - Continue motrin, tylenol, oxycodone PRN for pain control.  - Increase ambulation  - Continue regular diet  - Discharge planning    Mary Callaway MD   
  A/P: 33yo POD#2 s/p LTCS+BS.  Patient is stable and doing well post-operatively.    - Continue regular diet  - Increase ambulation  - Continue motrin, tylenol, oxycodone PRN for pain control.   -PEC w/o severe features - BP well controlled. No s/s of sPEC. Cont monitoring    Mary Callaway MD 
33yo POD#1 s/p rLTCS +BS .  Patient is stable and doing well post-operatively.      #Urine output  -Pt with low urine output in PACU   -70cc/hr->10cc/hr->30cc/hr->30cc/hr->20cc/hr->40cc/hr->45cc/hr  -Improvement in urine output s/p 1.5L bolus and PO hydration  -Will continue to monitor  -Creatinine 0.5->0.5 stable    #Postop from LTCS  - Continue regular diet.  - Increase ambulation.  - Continue current pain regimen  - Hct: 36.8->33.4    Nora Mora, PGY-1

## 2024-12-07 NOTE — PROGRESS NOTE ADULT - SUBJECTIVE AND OBJECTIVE BOX
OB Postpartum Note:  Delivery, POD#3    S: 35yo POD#3 s/p LTCS. The patient feels well.  Pain is well controlled, tolerating a regular diet,  passing flatus, voiding spontaneously, and ambulating without difficulty. Denies heavy vaginal bleeding, CP/SOB, lightheadedness/dizziness, N/V.     O:  Vitals:  Vital Signs Last 24 Hrs  T(C): 36.6 (07 Dec 2024 05:58), Max: 36.9 (06 Dec 2024 14:07)  T(F): 97.8 (07 Dec 2024 05:58), Max: 98.4 (06 Dec 2024 14:07)  HR: 91 (07 Dec 2024 05:58) (80 - 92)  BP: 138/73 (07 Dec 2024 05:58) (124/78 - 138/81)  BP(mean): --  RR: 18 (07 Dec 2024 05:58) (17 - 19)  SpO2: 99% (07 Dec 2024 05:58) (98% - 100%)    Parameters below as of 06 Dec 2024 22:00  Patient On (Oxygen Delivery Method): room air        MEDICATIONS  (STANDING):  acetaminophen     Tablet .. 975 milliGRAM(s) Oral <User Schedule>  heparin   Injectable 47967 Unit(s) SubCutaneous every 12 hours  ibuprofen  Tablet. 600 milliGRAM(s) Oral every 6 hours  pantoprazole    Tablet 40 milliGRAM(s) Oral before breakfast    MEDICATIONS  (PRN):  diphenhydrAMINE 25 milliGRAM(s) Oral every 6 hours PRN Pruritus  lanolin Ointment 1 Application(s) Topical every 6 hours PRN Sore Nipples  magnesium hydroxide Suspension 30 milliLiter(s) Oral two times a day PRN Constipation  oxyCODONE    IR 5 milliGRAM(s) Oral once PRN Moderate to Severe Pain (4-10)  oxyCODONE    IR 5 milliGRAM(s) Oral every 3 hours PRN Moderate to Severe Pain (4-10)  simethicone 80 milliGRAM(s) Chew every 4 hours PRN Gas      LABS:  Blood type: A Positive  Rubella IgG: RPR: Negative                          10.5[L]   13.95[H] >-----------< 165    (  @ 03:44 )             33.4[L]                        11.9   9.13 >-----------< 170    (  @ 11:30 )             36.8                        11.4[L]   9.01 >-----------< 171    (  @ 09:40 )             34.2[L]    24 @ 03:44      135  |  103  |  21  ----------------------------<  132[H]  4.8   |  17[L]  |  0.59    24 @ 11:30      138  |  105  |  18  ----------------------------<  80  4.1   |  21[L]  |  0.54    24 @ 09:40      137  |  105  |  18  ----------------------------<  76  4.3   |  20[L]  |  0.60        Ca    8.6      05 Dec 2024 03:44  Ca    9.0      04 Dec 2024 11:30  Ca    9.2      04 Dec 2024 09:40    TPro  5.7[L]  /  Alb  3.1[L]  /  TBili  0.3  /  DBili  <0.2  /  AST  25  /  ALT  12  /  AlkPhos  88  24 @ 03:44  TPro  6.1  /  Alb  3.4  /  TBili  0.3  /  DBili  x   /  AST  14  /  ALT  12  /  AlkPhos  94  24 @ 11:30  TPro  5.7[L]  /  Alb  3.3  /  TBili  0.3  /  DBili  x   /  AST  14  /  ALT  13  /  AlkPhos  98  24 @ 09:40          Physical exam:  Gen: NAD  CV: RRR  Resp: CTAB  Abdomen: Soft, nontender, no distension , firm uterine fundus at umbilicus.  Incision: Clean, dry, and intact   : Nl lochia  Ext: No calf tenderness. No edema

## 2024-12-10 ENCOUNTER — APPOINTMENT (OUTPATIENT)
Dept: OBGYN | Facility: CLINIC | Age: 34
End: 2024-12-10
Payer: MEDICAID

## 2024-12-10 VITALS
DIASTOLIC BLOOD PRESSURE: 96 MMHG | WEIGHT: 293 LBS | BODY MASS INDEX: 41.95 KG/M2 | SYSTOLIC BLOOD PRESSURE: 158 MMHG | HEIGHT: 70 IN

## 2024-12-10 DIAGNOSIS — Z3A.37 37 WEEKS GESTATION OF PREGNANCY: ICD-10-CM

## 2024-12-10 DIAGNOSIS — Z3A.36 36 WEEKS GESTATION OF PREGNANCY: ICD-10-CM

## 2024-12-10 DIAGNOSIS — O44.40 LOW LYING PLACENTA NOS OR WITHOUT HEMORRHAGE, UNSPECIFIED TRIMESTER: ICD-10-CM

## 2024-12-10 DIAGNOSIS — Z36.89 ENCOUNTER FOR OTHER SPECIFIED ANTENATAL SCREENING: ICD-10-CM

## 2024-12-10 DIAGNOSIS — Z3A.34 34 WEEKS GESTATION OF PREGNANCY: ICD-10-CM

## 2024-12-10 DIAGNOSIS — O99.213 OBESITY COMPLICATING PREGNANCY, THIRD TRIMESTER: ICD-10-CM

## 2024-12-10 DIAGNOSIS — Z3A.27 27 WEEKS GESTATION OF PREGNANCY: ICD-10-CM

## 2024-12-10 DIAGNOSIS — O09.529 SUPERVISION OF ELDERLY MULTIGRAVIDA, UNSPECIFIED TRIMESTER: ICD-10-CM

## 2024-12-10 DIAGNOSIS — O36.80X0 PREGNANCY WITH INCONCLUSIVE FETAL VIABILITY, NOT APPLICABLE OR UNSPECIFIED: ICD-10-CM

## 2024-12-10 DIAGNOSIS — Z3A.25 25 WEEKS GESTATION OF PREGNANCY: ICD-10-CM

## 2024-12-10 DIAGNOSIS — Z3A.20 20 WEEKS GESTATION OF PREGNANCY: ICD-10-CM

## 2024-12-10 DIAGNOSIS — Z3A.16 16 WEEKS GESTATION OF PREGNANCY: ICD-10-CM

## 2024-12-10 DIAGNOSIS — E66.01 OBESITY COMPLICATING PREGNANCY, THIRD TRIMESTER: ICD-10-CM

## 2024-12-10 DIAGNOSIS — Z3A.30 30 WEEKS GESTATION OF PREGNANCY: ICD-10-CM

## 2024-12-10 DIAGNOSIS — Z3A.12 12 WEEKS GESTATION OF PREGNANCY: ICD-10-CM

## 2024-12-10 DIAGNOSIS — Z3A.38 38 WEEKS GESTATION OF PREGNANCY: ICD-10-CM

## 2024-12-10 DIAGNOSIS — I10 ESSENTIAL (PRIMARY) HYPERTENSION: ICD-10-CM

## 2024-12-10 DIAGNOSIS — Z32.01 ENCOUNTER FOR PREGNANCY TEST, RESULT POSITIVE: ICD-10-CM

## 2024-12-10 DIAGNOSIS — Z3A.32 32 WEEKS GESTATION OF PREGNANCY: ICD-10-CM

## 2024-12-10 DIAGNOSIS — O34.219 MATERNAL CARE FOR UNSPECIFIED TYPE SCAR FROM PREVIOUS CESAREAN DELIVERY: ICD-10-CM

## 2024-12-10 DIAGNOSIS — Z3A.10 10 WEEKS GESTATION OF PREGNANCY: ICD-10-CM

## 2024-12-10 LAB — SURGICAL PATHOLOGY STUDY: SIGNIFICANT CHANGE UP

## 2024-12-10 PROCEDURE — 0503F POSTPARTUM CARE VISIT: CPT

## 2024-12-11 ENCOUNTER — APPOINTMENT (OUTPATIENT)
Dept: OBGYN | Facility: HOSPITAL | Age: 34
End: 2024-12-11

## 2024-12-12 NOTE — OB PST NOTE - PROBLEM SELECTOR PROBLEM 1

## 2024-12-16 ENCOUNTER — NON-APPOINTMENT (OUTPATIENT)
Age: 34
End: 2024-12-16

## 2024-12-17 ENCOUNTER — NON-APPOINTMENT (OUTPATIENT)
Age: 34
End: 2024-12-17

## 2024-12-19 ENCOUNTER — NON-APPOINTMENT (OUTPATIENT)
Age: 34
End: 2024-12-19

## 2024-12-20 ENCOUNTER — APPOINTMENT (OUTPATIENT)
Dept: OBGYN | Facility: CLINIC | Age: 34
End: 2024-12-20
Payer: MEDICAID

## 2024-12-20 VITALS
DIASTOLIC BLOOD PRESSURE: 82 MMHG | WEIGHT: 270 LBS | SYSTOLIC BLOOD PRESSURE: 140 MMHG | BODY MASS INDEX: 38.65 KG/M2 | HEIGHT: 70 IN

## 2024-12-20 PROCEDURE — 0503F POSTPARTUM CARE VISIT: CPT

## 2024-12-20 RX ORDER — LABETALOL HYDROCHLORIDE 200 MG/1
200 TABLET, FILM COATED ORAL TWICE DAILY
Qty: 120 | Refills: 0 | Status: DISCONTINUED | COMMUNITY
Start: 2024-12-10 | End: 2024-12-20

## 2024-12-20 RX ORDER — NIFEDIPINE 30 MG/1
30 TABLET, EXTENDED RELEASE ORAL DAILY
Qty: 30 | Refills: 1 | Status: DISCONTINUED | COMMUNITY
Start: 2024-12-10 | End: 2024-12-20

## 2025-01-06 ENCOUNTER — NON-APPOINTMENT (OUTPATIENT)
Age: 35
End: 2025-01-06

## 2025-01-07 ENCOUNTER — APPOINTMENT (OUTPATIENT)
Dept: OBGYN | Facility: CLINIC | Age: 35
End: 2025-01-07
Payer: MEDICAID

## 2025-01-07 VITALS
HEIGHT: 70 IN | WEIGHT: 272 LBS | BODY MASS INDEX: 38.94 KG/M2 | SYSTOLIC BLOOD PRESSURE: 120 MMHG | DIASTOLIC BLOOD PRESSURE: 62 MMHG

## 2025-01-07 PROCEDURE — 0503F POSTPARTUM CARE VISIT: CPT

## 2025-01-31 NOTE — OB RN TRIAGE NOTE - NS_FETALHEARTHEAR_OBGYN_ALL_OB
Detail Level: Zone Amount Injected At This Location In Cc (Will Not Render If 0): 0 Consent: Written consent obtained, risks reviewed including but not limited to pain, scarring, infection, bruising, HA, vascular events and incomplete improvement.  Patient understands the procedure is cosmetic in nature and will require out of pocket payment. Post-Care Instructions: After the procedure, take precautions agains sun exposure.  Advised would continue use of minoxidil within 48 hours of treatment.\\n\\nPt was doing maintenance at [id number] but do to amount of flare will f/u in 2-3 months to recapture; pt to start on ketoconazole shampoo to control seb derm; discussed concerns with possible scalp psoriasis.  Pt advised to be consistent with ketoconazole and f/u in 3-4 weeks to evaluate Treatment Number (Optional): 15 Location #1: scalp Yes Price (Use Numbers Only, No Special Characters Or $): 450 Which Technique?: Default Show Additional Techniques: Yes Standard Default Technique For Making Prp: Pt was advised on adequate hydration and eating 24 hours prior to procedure.  Pt was brought into room and in sterile fashion blood was drawn into 22 ml cellenis tube with 24g butterfly needle.  Tube was mixed adequately with inversion 5x and was centrifuged at 3300rpm for a total 10 min.  PRP was then drawn up in sterile fashion into 3cc syringes.  Scalp was cleansed with hibiclens and alcohol preparatory wipes.  Injections were made every 1-2cm with 0.1-0.3cc aliquotes intradermally as well as subcutaneous (3-6mm depth) with 30g 1/2in needle.  A total of 12-15cc injected in scalp.  Hemostasis was obtained at all areas.  Scalp was cleansed with wound wash saline.  Pt advised on Ibuprofen for pain and may continue with normal hair regimen.  Call with any complications experienced. Site Of Venipuncture?: left arm Who Performed The Venipuncture?: Dalton Number Of Tubes Drawn: 1 Venipuncture Paragraph: An alcohol pad was applied to the venipuncture site. Venipuncture was performed using a butterfly needle. Pressure and a bandaid was applied to the site. No complications were noted.

## 2025-02-05 ENCOUNTER — APPOINTMENT (OUTPATIENT)
Dept: INTERNAL MEDICINE | Facility: CLINIC | Age: 35
End: 2025-02-05
Payer: MEDICAID

## 2025-02-05 VITALS
SYSTOLIC BLOOD PRESSURE: 117 MMHG | RESPIRATION RATE: 16 BRPM | HEART RATE: 75 BPM | DIASTOLIC BLOOD PRESSURE: 82 MMHG | HEIGHT: 70 IN | OXYGEN SATURATION: 99 %

## 2025-02-05 DIAGNOSIS — Z82.49 FAMILY HISTORY OF ISCHEMIC HEART DISEASE AND OTHER DISEASES OF THE CIRCULATORY SYSTEM: ICD-10-CM

## 2025-02-05 DIAGNOSIS — E66.9 OBESITY, UNSPECIFIED: ICD-10-CM

## 2025-02-05 DIAGNOSIS — M54.50 LOW BACK PAIN, UNSPECIFIED: ICD-10-CM

## 2025-02-05 DIAGNOSIS — G89.29 LOW BACK PAIN, UNSPECIFIED: ICD-10-CM

## 2025-02-05 PROCEDURE — 36415 COLL VENOUS BLD VENIPUNCTURE: CPT

## 2025-02-05 PROCEDURE — 99385 PREV VISIT NEW AGE 18-39: CPT

## 2025-02-06 LAB
25(OH)D3 SERPL-MCNC: 23.6 NG/ML
ALBUMIN SERPL ELPH-MCNC: 4.7 G/DL
ALP BLD-CCNC: 91 U/L
ALT SERPL-CCNC: 22 U/L
ANION GAP SERPL CALC-SCNC: 13 MMOL/L
APPEARANCE: CLEAR
AST SERPL-CCNC: 18 U/L
BASOPHILS # BLD AUTO: 0.03 K/UL
BASOPHILS NFR BLD AUTO: 0.4 %
BILIRUB SERPL-MCNC: 0.3 MG/DL
BILIRUBIN URINE: NEGATIVE
BLOOD URINE: NEGATIVE
BUN SERPL-MCNC: 14 MG/DL
CALCIUM SERPL-MCNC: 9.6 MG/DL
CHLORIDE SERPL-SCNC: 105 MMOL/L
CHOLEST SERPL-MCNC: 211 MG/DL
CO2 SERPL-SCNC: 23 MMOL/L
COLOR: YELLOW
CREAT SERPL-MCNC: 0.45 MG/DL
EGFR: 128 ML/MIN/1.73M2
EOSINOPHIL # BLD AUTO: 0.11 K/UL
EOSINOPHIL NFR BLD AUTO: 1.3 %
ESTIMATED AVERAGE GLUCOSE: 94 MG/DL
GLUCOSE QUALITATIVE U: NEGATIVE MG/DL
GLUCOSE SERPL-MCNC: 86 MG/DL
HBA1C MFR BLD HPLC: 4.9 %
HCT VFR BLD CALC: 42.4 %
HDLC SERPL-MCNC: 78 MG/DL
HGB BLD-MCNC: 13.1 G/DL
IMM GRANULOCYTES NFR BLD AUTO: 0.2 %
KETONES URINE: NEGATIVE MG/DL
LDLC SERPL CALC-MCNC: 119 MG/DL
LEUKOCYTE ESTERASE URINE: NEGATIVE
LYMPHOCYTES # BLD AUTO: 2.08 K/UL
LYMPHOCYTES NFR BLD AUTO: 25.3 %
MAN DIFF?: NORMAL
MCHC RBC-ENTMCNC: 25.9 PG
MCHC RBC-ENTMCNC: 30.9 G/DL
MCV RBC AUTO: 84 FL
MONOCYTES # BLD AUTO: 0.43 K/UL
MONOCYTES NFR BLD AUTO: 5.2 %
NEUTROPHILS # BLD AUTO: 5.55 K/UL
NEUTROPHILS NFR BLD AUTO: 67.6 %
NITRITE URINE: NEGATIVE
NONHDLC SERPL-MCNC: 132 MG/DL
PH URINE: 6
PLATELET # BLD AUTO: 266 K/UL
POTASSIUM SERPL-SCNC: 4.6 MMOL/L
PROT SERPL-MCNC: 7.4 G/DL
PROTEIN URINE: NEGATIVE MG/DL
RBC # BLD: 5.05 M/UL
RBC # FLD: 14.6 %
SODIUM SERPL-SCNC: 141 MMOL/L
SPECIFIC GRAVITY URINE: 1.01
TRIGL SERPL-MCNC: 73 MG/DL
TSH SERPL-ACNC: 1.12 UIU/ML
UROBILINOGEN URINE: 0.2 MG/DL
VIT B12 SERPL-MCNC: 486 PG/ML
WBC # FLD AUTO: 8.22 K/UL

## 2025-02-19 DIAGNOSIS — E55.9 VITAMIN D DEFICIENCY, UNSPECIFIED: ICD-10-CM

## 2025-02-19 RX ORDER — MULTIVIT-MIN/FOLIC/VIT K/LYCOP 400-300MCG
25 MCG TABLET ORAL DAILY
Qty: 90 | Refills: 1 | Status: ACTIVE | COMMUNITY
Start: 2025-02-19 | End: 1900-01-01

## 2025-02-19 RX ORDER — CYANOCOBALAMIN (VITAMIN B-12) 1000 MCG
1000 TABLET ORAL DAILY
Qty: 90 | Refills: 1 | Status: ACTIVE | COMMUNITY
Start: 2025-02-19 | End: 1900-01-01

## 2025-03-04 NOTE — ED PROVIDER NOTE - CONSTITUTIONAL APPEARANCE HYGIENE, MLM
Has chronic back pain (has been an issue as she states even before her cancer diagnosis years ago).  -discussed offer of Spine and Pain specialist referral which she declines at this time.          well appearing

## 2025-03-11 ENCOUNTER — NON-APPOINTMENT (OUTPATIENT)
Age: 35
End: 2025-03-11

## 2025-03-11 ENCOUNTER — LABORATORY RESULT (OUTPATIENT)
Age: 35
End: 2025-03-11

## 2025-03-11 ENCOUNTER — APPOINTMENT (OUTPATIENT)
Dept: OBGYN | Facility: CLINIC | Age: 35
End: 2025-03-11
Payer: MEDICAID

## 2025-03-11 VITALS
HEIGHT: 70 IN | BODY MASS INDEX: 40.94 KG/M2 | WEIGHT: 286 LBS | SYSTOLIC BLOOD PRESSURE: 128 MMHG | DIASTOLIC BLOOD PRESSURE: 85 MMHG

## 2025-03-11 DIAGNOSIS — D06.9 CARCINOMA IN SITU OF CERVIX, UNSPECIFIED: ICD-10-CM

## 2025-03-11 DIAGNOSIS — Z01.419 ENCOUNTER FOR GYNECOLOGICAL EXAMINATION (GENERAL) (ROUTINE) W/OUT ABNORMAL FINDINGS: ICD-10-CM

## 2025-03-11 DIAGNOSIS — Z11.3 ENCOUNTER FOR SCREENING FOR INFECTIONS WITH A PREDOMINANTLY SEXUAL MODE OF TRANSMISSION: ICD-10-CM

## 2025-03-11 PROCEDURE — 99395 PREV VISIT EST AGE 18-39: CPT

## 2025-03-11 PROCEDURE — 99459 PELVIC EXAMINATION: CPT

## 2025-03-14 LAB
C TRACH RRNA SPEC QL NAA+PROBE: NOT DETECTED
HPV HIGH+LOW RISK DNA PNL CVX: NOT DETECTED
N GONORRHOEA RRNA SPEC QL NAA+PROBE: NOT DETECTED
SOURCE TP AMPLIFICATION: NORMAL

## 2025-03-17 LAB — CYTOLOGY CVX/VAG DOC THIN PREP: NORMAL

## 2025-04-05 ENCOUNTER — EMERGENCY (EMERGENCY)
Facility: HOSPITAL | Age: 35
LOS: 1 days | End: 2025-04-05
Attending: EMERGENCY MEDICINE | Admitting: EMERGENCY MEDICINE
Payer: MEDICAID

## 2025-04-05 VITALS
HEART RATE: 92 BPM | OXYGEN SATURATION: 100 % | DIASTOLIC BLOOD PRESSURE: 78 MMHG | RESPIRATION RATE: 17 BRPM | WEIGHT: 279.99 LBS | SYSTOLIC BLOOD PRESSURE: 129 MMHG | TEMPERATURE: 99 F

## 2025-04-05 DIAGNOSIS — Z98.891 HISTORY OF UTERINE SCAR FROM PREVIOUS SURGERY: Chronic | ICD-10-CM

## 2025-04-05 DIAGNOSIS — K08.409 PARTIAL LOSS OF TEETH, UNSPECIFIED CAUSE, UNSPECIFIED CLASS: Chronic | ICD-10-CM

## 2025-04-05 LAB
ADD ON TEST-SPECIMEN IN LAB: SIGNIFICANT CHANGE UP
ALBUMIN SERPL ELPH-MCNC: 3.9 G/DL — SIGNIFICANT CHANGE UP (ref 3.3–5)
ALP SERPL-CCNC: 69 U/L — SIGNIFICANT CHANGE UP (ref 40–120)
ALT FLD-CCNC: 18 U/L — SIGNIFICANT CHANGE UP (ref 4–33)
ANION GAP SERPL CALC-SCNC: 14 MMOL/L — SIGNIFICANT CHANGE UP (ref 7–14)
AST SERPL-CCNC: 15 U/L — SIGNIFICANT CHANGE UP (ref 4–32)
BASOPHILS # BLD AUTO: 0.04 K/UL — SIGNIFICANT CHANGE UP (ref 0–0.2)
BASOPHILS NFR BLD AUTO: 0.5 % — SIGNIFICANT CHANGE UP (ref 0–2)
BILIRUB SERPL-MCNC: <0.2 MG/DL — SIGNIFICANT CHANGE UP (ref 0.2–1.2)
BUN SERPL-MCNC: 15 MG/DL — SIGNIFICANT CHANGE UP (ref 7–23)
CALCIUM SERPL-MCNC: 9.1 MG/DL — SIGNIFICANT CHANGE UP (ref 8.4–10.5)
CHLORIDE SERPL-SCNC: 107 MMOL/L — SIGNIFICANT CHANGE UP (ref 98–107)
CO2 SERPL-SCNC: 21 MMOL/L — LOW (ref 22–31)
CREAT SERPL-MCNC: 0.48 MG/DL — LOW (ref 0.5–1.3)
EGFR: 126 ML/MIN/1.73M2 — SIGNIFICANT CHANGE UP
EGFR: 126 ML/MIN/1.73M2 — SIGNIFICANT CHANGE UP
EOSINOPHIL # BLD AUTO: 0.07 K/UL — SIGNIFICANT CHANGE UP (ref 0–0.5)
EOSINOPHIL NFR BLD AUTO: 0.9 % — SIGNIFICANT CHANGE UP (ref 0–6)
GLUCOSE SERPL-MCNC: 121 MG/DL — HIGH (ref 70–99)
HCT VFR BLD CALC: 36.3 % — SIGNIFICANT CHANGE UP (ref 34.5–45)
HGB BLD-MCNC: 11.9 G/DL — SIGNIFICANT CHANGE UP (ref 11.5–15.5)
IANC: 5.95 K/UL — SIGNIFICANT CHANGE UP (ref 1.8–7.4)
IMM GRANULOCYTES NFR BLD AUTO: 0.4 % — SIGNIFICANT CHANGE UP (ref 0–0.9)
LYMPHOCYTES # BLD AUTO: 1.54 K/UL — SIGNIFICANT CHANGE UP (ref 1–3.3)
LYMPHOCYTES # BLD AUTO: 19.2 % — SIGNIFICANT CHANGE UP (ref 13–44)
MCHC RBC-ENTMCNC: 26.4 PG — LOW (ref 27–34)
MCHC RBC-ENTMCNC: 32.8 G/DL — SIGNIFICANT CHANGE UP (ref 32–36)
MCV RBC AUTO: 80.7 FL — SIGNIFICANT CHANGE UP (ref 80–100)
MONOCYTES # BLD AUTO: 0.41 K/UL — SIGNIFICANT CHANGE UP (ref 0–0.9)
MONOCYTES NFR BLD AUTO: 5.1 % — SIGNIFICANT CHANGE UP (ref 2–14)
NEUTROPHILS # BLD AUTO: 5.95 K/UL — SIGNIFICANT CHANGE UP (ref 1.8–7.4)
NEUTROPHILS NFR BLD AUTO: 73.9 % — SIGNIFICANT CHANGE UP (ref 43–77)
NRBC # BLD AUTO: 0 K/UL — SIGNIFICANT CHANGE UP (ref 0–0)
NRBC # FLD: 0 K/UL — SIGNIFICANT CHANGE UP (ref 0–0)
NRBC BLD AUTO-RTO: 0 /100 WBCS — SIGNIFICANT CHANGE UP (ref 0–0)
PLATELET # BLD AUTO: 228 K/UL — SIGNIFICANT CHANGE UP (ref 150–400)
POTASSIUM SERPL-MCNC: 4 MMOL/L — SIGNIFICANT CHANGE UP (ref 3.5–5.3)
POTASSIUM SERPL-SCNC: 4 MMOL/L — SIGNIFICANT CHANGE UP (ref 3.5–5.3)
PROT SERPL-MCNC: 6.7 G/DL — SIGNIFICANT CHANGE UP (ref 6–8.3)
RBC # BLD: 4.5 M/UL — SIGNIFICANT CHANGE UP (ref 3.8–5.2)
RBC # FLD: 14.3 % — SIGNIFICANT CHANGE UP (ref 10.3–14.5)
SODIUM SERPL-SCNC: 142 MMOL/L — SIGNIFICANT CHANGE UP (ref 135–145)
WBC # BLD: 8.04 K/UL — SIGNIFICANT CHANGE UP (ref 3.8–10.5)
WBC # FLD AUTO: 8.04 K/UL — SIGNIFICANT CHANGE UP (ref 3.8–10.5)

## 2025-04-05 PROCEDURE — 99284 EMERGENCY DEPT VISIT MOD MDM: CPT

## 2025-04-05 PROCEDURE — 93010 ELECTROCARDIOGRAM REPORT: CPT

## 2025-04-05 NOTE — ED ADULT TRIAGE NOTE - CHIEF COMPLAINT QUOTE
Pt 4 months post partum presenting to ED for palpitation, hypertension, back pain and neck pain starting 10 days ago worsening today. Pt took bp medication just prior to arrival. Pt was found to have preeclampsia 1 day prior to delivery and started on labetalol. Pt states she stopped taking bp meds 3 weeks post . Denies other medical problems. Denies chest pain.

## 2025-04-05 NOTE — ED PROVIDER NOTE - OBJECTIVE STATEMENT
35-year-old female with PMHx pre-eclampsia presenting secondary to palpitations, elevated BP readings at home x 10 days. Palpitations are intermittent. Resolves without intervention. Systolic BP today 160s.  Patient's son has SVT, patient has EKG device at home for him, used it during episode today and tracing showed , sinus rhythm.  No ST changes.  Patient endorses tingling to toes and cramping to extremities during episode.  Patient has a 10-year-old, 5-year-old, 4-month-old at home.  Delivery was via . Pt never had a seizure.  Patient had her first menses last month, currently menstruating, periods are heavy.  Denies fever, CP, SOB, abd pain, n/v, d/c, dysuria, HA. Not on birth control.

## 2025-04-05 NOTE — ED ADULT NURSE NOTE - OBJECTIVE STATEMENT
Pt is A&O x 4, ambulatory w/o assistance, shows no signs of acute distress. Presents to the ED w/ intermittent palpitations and high blood pressure x 10 days. Pt is 4 months post partum, hx of pre-eclampsia. States her BP today was 160/103 and highest HR was 130. Endorses taking a Labetalol prior to arrival in the ED. Denies dizziness/vision changes, diaphoresis, h/a or SOB. VSS upon arrival. Respirations even and unlabored. ECG NSR. Labs drawn as per MAR. Pending lab results and reassessment.

## 2025-04-05 NOTE — ED ADULT NURSE NOTE - PRIMARY CARE PROVIDER
Chief Complaint   Patient presents with     Urgent Care     URI     x 3-4 weeks congestion     Cough     x 3-4 weeks, wheezing, non productive     Sinus Problem     Sinus pain and pressure.      SUBJECTIVE:  Maia Miranda, a 83 year old female scheduled an appointment to discuss the following issues:     Congestion of paranasal sinus  Cough  Post-nasal drip     She has been noticing some symptoms of sinus congestion with post nasal drip and coughing for last 3 weeks   Has no sob or chest pain or chest congestion symptoms , she is afebrile and denies any chills     Past Medical History   Diagnosis Date     Esophageal reflux 2001     Abstracted 06/10/02     EROSIVE EYE DISORDER 1994     Dr. Warner, [facility] yearly     Osteoporosis, unspecified 2003     T = -2.66     Undiagnosed cardiac murmurs      Unspecified essential hypertension      Other pulmonary embolism and infarction 1971     no source found     LACTOSE INTOLERANCE      Occlusion and stenosis of carotid artery without mention of cerebral infarction 2003     CAROTID US NORMAL, bruit in neck     Chronic duodenal ulcer without mention of hemorrhage, perforation, or obstruction 1974     Ulcer, Duod     Depressive disorder, not elsewhere classified 2003     Generalized osteoarthrosis, unspecified site      Hands     Essential and other specified forms of tremor 2004     resting tremor     Unspecified tinnitus 2005     mild hearing loss, saw ENT     Lumbago      sees Kodi Guidry     Hiatal hernia      diagnosed late 1990s [place], [place]     Amnestic MCI (mild cognitive impairment with memory loss) 2014      Past Surgical History   Procedure Laterality Date     C nonspecific procedure       D&C x 2 in 1957 & 1962 -- Abstracted 06/10/02     C nonspecific procedure       Left breast biopsy x 2 in 1988 & 1989 -- Abstracted 06/10/02     C nonspecific procedure  1958     Influenza resulting in hospitalization -- Abstracted 06/10/02     C nonspecific procedure   1971     10 day hospitalization from bilateral pulmonary enboli -- Abstracted 06/10/02     C nonspecific procedure  1/03     colonoscopy  negative     Esophagoscopy, gastroscopy, duodenoscopy (egd), combined  7/8/2013     Procedure: COMBINED ESOPHAGOSCOPY, GASTROSCOPY, DUODENOSCOPY (EGD);   ESOPHAGOSCOPY, GASTROSCOPY, DUODENOSCOPY (EGD)   ;  Surgeon: Shawn Soto MD;  Location: [facility]     Corneal scraping       Cataract iol, rt/lt       Cystoscopy, biopsy bladder, combined N/A 7/25/2016     Procedure: COMBINED CYSTOSCOPY, BIOPSY BLADDER;  Surgeon: Bernadine Maria MD;  Location: [facility]        Social History     Social History     Marital Status:      Spouse Name: N/A     Number of Children: 4     Years of Education: N/A     Occupational History      Retired      at [employer]     Social History Main Topics     Smoking status: Never Smoker      Smokeless tobacco: Never Used     Alcohol Use: No     Drug Use: No     Sexual Activity: No     Other Topics Concern     Not on file     Social History Narrative    .Living situation (location, living with others?): Lives alone in a condo. Lives 2 miles from daughter and son.     Activities of daily living (e.g., dressing, eating, walking): Independent        Instrumental activities of daily living (e.g., finance management, housekeeping, meal planning/ prep): Independent        Advance Care plan: has a living will and POLST        Driving: Yes    Medication: manages by self    Meaningful Activities (e.g., hobbies, work): Reads, has coffee out with girlfriends every week, has a hummingbird feeder, likes to watch baseball        Support: Help with cleaning every few weeks    Community Resources Used/ Interested in: None at this time        Current Outpatient Prescriptions   Medication Sig Dispense Refill     warfarin (COUMADIN) 2.5 MG tablet Take 2.5mg (1 tablet)  MTWTFSS or as directed by INR Clinic. 100 tablet 3     metoprolol (TOPROL-XL) 50 MG 24 hr  tablet Take 1 tablet (50 mg) by mouth daily 90 tablet 1     methenamine hippurate (HIPREX) 1 G TABS Take 1 tablet (1 g) by mouth daily 90 tablet 3     lisinopril (PRINIVIL,ZESTRIL) 10 MG tablet Take 1 tablet (10 mg) by mouth daily (Patient taking differently: Take 10 mg by mouth daily AM) 90 tablet 3     polyethylene glycol (MIRALAX/GLYCOLAX) packet Take 1 packet by mouth daily       ketoprofen 10% in PLO 10% Place onto the skin every 4 hours as needed for moderate pain       conjugated estrogens (PREMARIN) vaginal cream Place vaginally twice a week       fluocinonide (LIDEX) 0.05 % cream Apply topically 2 times daily as needed 30 g 2     acetaminophen (TYLENOL) 500 MG tablet Take 500-1,000 mg by mouth every 6 hours as needed       Menthol, Topical Analgesic, 10 % GEL Use topical cream on hands twice a day, prn for pain 60 g 0     Catheters (GENTLECATH URINARY CATHETER) MISC 1 catheter 4 times daily 120 each 12     Lubricants (SM LUBRICATING JELLY) GEL Externally apply 1 packet topically 4 times daily 144 Tube 12     ascorbic acid (VITAMIN C) 1000 MG TABS Take 1 tablet (1,000 mg) by mouth 2 times daily (Patient taking differently: Take 1,000 mg by mouth daily With HYprex) 180 tablet 3     Omega-3 Fatty Acids (OMEGA-3 FISH OIL PO) Take 1 g by mouth 2 times daily (with meals)       sodium chloride (PETER 128) 5 % ophthalmic ointment Place 1 Application into both eyes At Bedtime       carboxymethylcellulose (CELLUVISC/REFRESH LIQUIGEL) 1 % ophthalmic solution Place 1 drop into both eyes every morning        MAGNESIUM 250 MG OR TABS Take  by mouth. 1 tablet daily at HS  0       Health Maintenance   Topic Date Due     DEXA Q2 YR  04/30/2017     MAMMO Q1 YR INBASKET MESSAGE  05/06/2017     BMP Q1 YR (NO INBASKET)  05/16/2017     HEMOGLOBIN Q1 YR (NO INBASKET)  05/16/2017     FALL RISK ASSESSMENT  05/16/2017     LIPID MONITORING Q1 YEAR( NO INBASKET)  05/16/2017     MICROALBUMIN Q1 YEAR( NO INBASKET)  05/16/2017      INFLUENZA VACCINE (SYSTEM ASSIGNED)  09/01/2017     OP ANNUAL INR REFERRAL  09/21/2017     ADVANCE DIRECTIVE PLANNING Q5 YRS (NO INBASKET)  01/15/2018     COLONOSCOPY Q10 YR INBASKET MESSAGE  06/14/2021     TETANUS Q10 YR  11/05/2023     PNEUMOCOCCAL  Completed        ROS:  CONSTITUTIONAL:no fever, no chills or sweats, no excessive fatigue, no significant change in weight  CV: neg   RESP -sinus congestion   GI:  Neg   NEURO: neg   MSK - neg   Skin - neg   Pyschiatry-neg     OBJECTIVE:  /58 mmHg  Pulse 58  Temp(Src) 95.6  F (35.3  C) (Tympanic)  Resp 16  Wt 159 lb (72.122 kg)  SpO2 99%      EXAM:  GENERAL APPEARANCE: healthy, alert and no distress  EYES: EOMI,  PERRL  HENT: ear canals and TM's normal and nose clear nasal drainage noted  and mouth without ulcers or lesions  RESP: lungs clear to auscultation - no rales, rhonchi or wheezes  ABDOMEN:  soft, nontender, no HSM or masses and bowel sounds normal  PSYCH: mentation appears normal and affect normal/bright        ASSESSMENT/PLAN:  Maia was seen today for urgent care, uri, cough and sinus problem.    Diagnoses and all orders for this visit:    Congestion of paranasal sinus    Cough    Post-nasal drip    related to post nasal drip   Discussed with pt symptoms seem to be from allergy  Advised to do saline nasal spray   Also discussed about doing otc antihistamines   i do not think she needs antibiotic  But if symptoms get changed with thick drainage then consider doing antibiotics       Follow up if  symptoms fail to improve or worsens   Pt understood and agreed with plan       Spent>25 minutes with patient and > 50% of the time was for counselling       Shirley Dominguez MD          unknown

## 2025-04-05 NOTE — ED ADULT NURSE NOTE - NSFALLUNIVINTERV_ED_ALL_ED
Bed/Stretcher in lowest position, wheels locked, appropriate side rails in place/Call bell, personal items and telephone in reach/Instruct patient to call for assistance before getting out of bed/chair/stretcher/Non-slip footwear applied when patient is off stretcher/[place] to call system/Physically safe environment - no spills, clutter or unnecessary equipment/Purposeful proactive rounding/Room/bathroom lighting operational, light cord in reach

## 2025-04-05 NOTE — ED PROVIDER NOTE - PHYSICAL EXAMINATION
Const: Awake, alert, no acute distress.  Appears well.  Moving comfortably on stretcher. Morbidly obese.   HEENT: NC/AT.  Moist mucous membranes.  Eyes: Extraocular movements intact b/l.  No scleral icterus.  Neck: Full ROM without pain. Supple.  Cardiac: Regular rate and regular rhythm. S1 S2 present. No peripheral edema.   Resp: No evidence of respiratory distress.  No stridor or wheeze.  Good air entry b/l, breath sounds clear to auscultation b/l.  Abd: Non-distended. Soft, nontender - limited exam 2/2 body habitus.  Skin: Normal coloration.  No rashes, abrasions or lacerations.  Neuro: Awake, alert & oriented x 3.  Moves all extremities symmetrically.  No obvious focal deficits.

## 2025-04-05 NOTE — ED PROVIDER NOTE - CLINICAL SUMMARY MEDICAL DECISION MAKING FREE TEXT BOX
34 yo F with PMHx pre-eclampsia presenting with 10 days of intermittent palpitations and elevated BP readings. Currently menstruating - heavy periods. VS: wnl. PE: morbidly obese, benign exam.  DDx includes but is not limited to primary HTN, anemia, anxiety,   Work up: basic labs  Tx: asymptomatic at this time  Plan: if work up wnl will DC for PCP f/u

## 2025-04-05 NOTE — ED PROVIDER NOTE - NSFOLLOWUPINSTRUCTIONS_ED_ALL_ED_FT
You were seen in the ED for palpitations and high BP readings at home. You had blood work done here today. The results are attached within this packet, please bring it with you when you follow up with your PCP in the next 2-3 days.  If you have issues obtaining follow up, please call: [phone] to obtain a doctor or specialist who takes your insurance in your area.    Rest, drink plenty of fluids.  Advance activity as tolerated.      A palpitation is the feeling that your heartbeat is irregular or is faster than normal. It may feel like your heart is fluttering or skipping a beat. They may be caused by many things, including smoking, caffeine, alcohol, stress, and certain medicines. Although most causes of palpitations are not serious, palpitations can be a sign of a serious medical problem. Avoid caffeine, alcohol, and tobacco products at home. Try to reduce stress and anxiety and make sure to get plenty of rest.     Hypertension, commonly called high blood pressure, is when the force of blood pumping through your arteries is too strong. Hypertension forces your heart to work harder to pump blood. Your arteries may become narrow or stiff. Having untreated or uncontrolled hypertension for a long period of time can cause heart attack, stroke, kidney disease, and other problems. Maintain a healthy lifestyle. Follow up with your PCP to see if your blood pressure medication needs to be adjusted.    SEEK IMMEDIATE MEDICAL CARE IF YOU HAVE ANY OF THE FOLLOWING SYMPTOMS: severe headache, confusion, chest pain, abdominal pain, vomiting, or shortness of breath, dizziness/lightheadedness, or fainting.

## 2025-04-05 NOTE — ED ADULT NURSE NOTE - CAS ELECT INFOMATION PROVIDED
BP is adequately controlled  Continue Benazepril 20 mg daily, HCTZ 12 5 mg daily 
Blood work done in 11/19 showed normal CBC with dif  Patient is currently in remission  Follow- up with hematology- oncology Dr Jr Gonzalez in 6 months 
Follow a low-cholesterol, low-fat diet, regular exercise  Check CMP, lipid panel in 3 months 
Likely viral etiology  Recommended to increase fluid intake, take Tylenol Cold PRN, Robitussin DM pas needed for cough  Use throat lozenges  Call office if symptoms persist or worsen 
Symptoms are stable  Patient is no longer using Breo Ellipta  Rx sent to the pharmacy for Ventolin HFA inhaler to use PRN 
Left prior to d/c VS assessment./DC instructions

## 2025-04-05 NOTE — ED PROVIDER NOTE - PATIENT PORTAL LINK FT
You can access the FollowMyHealth Patient Portal offered by [facility] by registering at the following website: [email or web address]. By joining [facility]’s FollowMyHealth portal, you will also be able to view your health information using other applications (apps) compatible with our system.

## 2025-04-05 NOTE — ED ADULT NURSE NOTE - CAS TRG GENERAL AIRWAY, MLM
Airway patent, Nasal mucosa clear. Mouth with normal mucosa. Throat has no vesicles, no oropharyngeal exudates and uvula is midline.
Patent

## 2025-04-05 NOTE — ED PROVIDER NOTE - PROGRESS NOTE DETAILS
Pt asymptomatic throughout her time in ED. Lab results reviewed with pt at bedside. All questions answered. Care instructions and return precautions discussed.  Patient is stable and ready for DC. Will follow with PCP outpatient for work up of elevated BP readings.

## 2025-04-07 ENCOUNTER — APPOINTMENT (OUTPATIENT)
Dept: INTERNAL MEDICINE | Facility: CLINIC | Age: 35
End: 2025-04-07
Payer: MEDICAID

## 2025-04-07 VITALS
OXYGEN SATURATION: 98 % | WEIGHT: 288 LBS | SYSTOLIC BLOOD PRESSURE: 117 MMHG | HEIGHT: 70 IN | DIASTOLIC BLOOD PRESSURE: 83 MMHG | TEMPERATURE: 98.1 F | HEART RATE: 87 BPM | RESPIRATION RATE: 16 BRPM | BODY MASS INDEX: 41.23 KG/M2

## 2025-04-07 DIAGNOSIS — R05.9 COUGH, UNSPECIFIED: ICD-10-CM

## 2025-04-07 PROCEDURE — G2211 COMPLEX E/M VISIT ADD ON: CPT | Mod: NC

## 2025-04-07 PROCEDURE — 99213 OFFICE O/P EST LOW 20 MIN: CPT

## 2025-04-07 RX ORDER — PROMETHAZINE HYDROCHLORIDE AND DEXTROMETHORPHAN HYDROBROMIDE ORAL SOLUTION 15; 6.25 MG/5ML; MG/5ML
6.25-15 SOLUTION ORAL
Qty: 1 | Refills: 0 | Status: ACTIVE | COMMUNITY
Start: 2025-04-07 | End: 1900-01-01

## 2025-04-10 ENCOUNTER — NON-APPOINTMENT (OUTPATIENT)
Age: 35
End: 2025-04-10

## 2025-04-10 ENCOUNTER — APPOINTMENT (OUTPATIENT)
Dept: CARDIOLOGY | Facility: CLINIC | Age: 35
End: 2025-04-10
Payer: MEDICAID

## 2025-04-10 VITALS
OXYGEN SATURATION: 99 % | DIASTOLIC BLOOD PRESSURE: 78 MMHG | HEIGHT: 70 IN | TEMPERATURE: 97.9 F | SYSTOLIC BLOOD PRESSURE: 112 MMHG | RESPIRATION RATE: 16 BRPM | BODY MASS INDEX: 41.09 KG/M2 | HEART RATE: 72 BPM | WEIGHT: 287 LBS

## 2025-04-10 DIAGNOSIS — R00.2 PALPITATIONS: ICD-10-CM

## 2025-04-10 PROCEDURE — 93246 EXT ECG>7D<15D RECORDING: CPT

## 2025-04-10 PROCEDURE — 99214 OFFICE O/P EST MOD 30 MIN: CPT | Mod: 25

## 2025-04-10 PROCEDURE — 93000 ELECTROCARDIOGRAM COMPLETE: CPT | Mod: 59

## 2025-04-28 ENCOUNTER — APPOINTMENT (OUTPATIENT)
Dept: INTERNAL MEDICINE | Facility: CLINIC | Age: 35
End: 2025-04-28
Payer: MEDICAID

## 2025-04-28 VITALS
BODY MASS INDEX: 41.09 KG/M2 | HEART RATE: 96 BPM | TEMPERATURE: 98.2 F | DIASTOLIC BLOOD PRESSURE: 74 MMHG | OXYGEN SATURATION: 98 % | SYSTOLIC BLOOD PRESSURE: 135 MMHG | HEIGHT: 70 IN | WEIGHT: 287 LBS

## 2025-04-28 DIAGNOSIS — K21.9 GASTRO-ESOPHAGEAL REFLUX DISEASE W/OUT ESOPHAGITIS: ICD-10-CM

## 2025-04-28 DIAGNOSIS — R16.1 SPLENOMEGALY, NOT ELSEWHERE CLASSIFIED: ICD-10-CM

## 2025-04-28 DIAGNOSIS — R00.2 PALPITATIONS: ICD-10-CM

## 2025-04-28 DIAGNOSIS — R09.A2 FOREIGN BODY SENSATION, THROAT: ICD-10-CM

## 2025-04-28 PROCEDURE — 99214 OFFICE O/P EST MOD 30 MIN: CPT

## 2025-04-28 PROCEDURE — G2211 COMPLEX E/M VISIT ADD ON: CPT | Mod: NC

## 2025-04-28 RX ORDER — PANTOPRAZOLE 40 MG/1
40 TABLET, DELAYED RELEASE ORAL DAILY
Qty: 90 | Refills: 0 | Status: ACTIVE | COMMUNITY
Start: 2025-04-28 | End: 1900-01-01

## 2025-05-21 ENCOUNTER — APPOINTMENT (OUTPATIENT)
Dept: GASTROENTEROLOGY | Facility: CLINIC | Age: 35
End: 2025-05-21
Payer: MEDICAID

## 2025-05-21 VITALS
HEART RATE: 80 BPM | OXYGEN SATURATION: 98 % | TEMPERATURE: 97.5 F | BODY MASS INDEX: 43.25 KG/M2 | SYSTOLIC BLOOD PRESSURE: 120 MMHG | WEIGHT: 292 LBS | HEIGHT: 69 IN | DIASTOLIC BLOOD PRESSURE: 74 MMHG

## 2025-05-21 PROCEDURE — 99203 OFFICE O/P NEW LOW 30 MIN: CPT

## 2025-05-21 RX ORDER — PANTOPRAZOLE 40 MG/1
40 TABLET, DELAYED RELEASE ORAL TWICE DAILY
Qty: 60 | Refills: 2 | Status: ACTIVE | COMMUNITY
Start: 2025-05-21 | End: 1900-01-01

## 2025-05-22 ENCOUNTER — APPOINTMENT (OUTPATIENT)
Dept: INTERNAL MEDICINE | Facility: CLINIC | Age: 35
End: 2025-05-22
Payer: MEDICAID

## 2025-05-22 ENCOUNTER — APPOINTMENT (OUTPATIENT)
Dept: CARDIOLOGY | Facility: CLINIC | Age: 35
End: 2025-05-22
Payer: MEDICAID

## 2025-05-22 VITALS
WEIGHT: 292 LBS | HEART RATE: 79 BPM | BODY MASS INDEX: 43.25 KG/M2 | SYSTOLIC BLOOD PRESSURE: 113 MMHG | HEIGHT: 69 IN | DIASTOLIC BLOOD PRESSURE: 72 MMHG | OXYGEN SATURATION: 100 % | TEMPERATURE: 98.1 F

## 2025-05-22 DIAGNOSIS — R09.A2 FOREIGN BODY SENSATION, THROAT: ICD-10-CM

## 2025-05-22 DIAGNOSIS — R00.2 PALPITATIONS: ICD-10-CM

## 2025-05-22 DIAGNOSIS — K21.9 GASTRO-ESOPHAGEAL REFLUX DISEASE W/OUT ESOPHAGITIS: ICD-10-CM

## 2025-05-22 PROCEDURE — 99214 OFFICE O/P EST MOD 30 MIN: CPT

## 2025-05-22 PROCEDURE — G2211 COMPLEX E/M VISIT ADD ON: CPT | Mod: NC

## 2025-05-22 PROCEDURE — 93248 EXT ECG>7D<15D REV&INTERPJ: CPT

## 2025-05-30 NOTE — ASU PATIENT PROFILE, ADULT - PRO ARRIVE FROM
Arterial Line Insertion  Performed by: Latia Escalona DO  Authorized by: Latia Escalona DO   Consent: Written consent obtained  Risks and benefits: risks, benefits and alternatives were discussed  Consent given by: parent  Patient understanding: patient states understanding of the procedure being performed  Patient consent: the patient's understanding of the procedure matches consent given  Patient identity confirmed: provided demographic data, hospital-assigned identification number and arm band  Preparation: Patient was prepped and draped in the usual sterile fashion    Indications: hemodynamic monitoring  Orientation:  Right  Location: radial artery  Procedure Details:  Needle gauge: 20  Seldinger technique: Seldinger technique used  Number of attempts: 1    Post-procedure:  Post-procedure: dressing applied  Waveform: good waveform  Post-procedure CNS: normal  Patient tolerance: Patient tolerated the procedure well with no immediate complications home

## 2025-05-30 NOTE — ASU PATIENT PROFILE, ADULT - FALL HARM RISK - UNIVERSAL INTERVENTIONS
Bed in lowest position, wheels locked, appropriate side rails in place/Call bell, personal items and telephone in reach/Instruct patient to call for assistance before getting out of bed or chair/Non-slip footwear when patient is out of bed/[place] to call system/Physically safe environment - no spills, clutter or unnecessary equipment/Purposeful Proactive Rounding/Room/bathroom lighting operational, light cord in reach

## 2025-06-02 ENCOUNTER — RESULT REVIEW (OUTPATIENT)
Age: 35
End: 2025-06-02

## 2025-06-02 ENCOUNTER — APPOINTMENT (OUTPATIENT)
Dept: GASTROENTEROLOGY | Facility: HOSPITAL | Age: 35
End: 2025-06-02
Payer: MEDICAID

## 2025-06-02 ENCOUNTER — TRANSCRIPTION ENCOUNTER (OUTPATIENT)
Age: 35
End: 2025-06-02

## 2025-06-02 ENCOUNTER — OUTPATIENT (OUTPATIENT)
Dept: OUTPATIENT SERVICES | Facility: HOSPITAL | Age: 35
LOS: 1 days | End: 2025-06-02
Payer: MEDICAID

## 2025-06-02 VITALS
RESPIRATION RATE: 14 BRPM | DIASTOLIC BLOOD PRESSURE: 84 MMHG | SYSTOLIC BLOOD PRESSURE: 128 MMHG | HEIGHT: 70 IN | OXYGEN SATURATION: 97 % | WEIGHT: 289.91 LBS | TEMPERATURE: 98 F | HEART RATE: 90 BPM

## 2025-06-02 VITALS
OXYGEN SATURATION: 98 % | SYSTOLIC BLOOD PRESSURE: 131 MMHG | RESPIRATION RATE: 21 BRPM | DIASTOLIC BLOOD PRESSURE: 58 MMHG | HEART RATE: 87 BPM

## 2025-06-02 DIAGNOSIS — K21.9 GASTRO-ESOPHAGEAL REFLUX DISEASE WITHOUT ESOPHAGITIS: ICD-10-CM

## 2025-06-02 DIAGNOSIS — Z98.891 HISTORY OF UTERINE SCAR FROM PREVIOUS SURGERY: Chronic | ICD-10-CM

## 2025-06-02 DIAGNOSIS — K08.409 PARTIAL LOSS OF TEETH, UNSPECIFIED CAUSE, UNSPECIFIED CLASS: Chronic | ICD-10-CM

## 2025-06-02 DIAGNOSIS — Z98.890 OTHER SPECIFIED POSTPROCEDURAL STATES: Chronic | ICD-10-CM

## 2025-06-02 LAB — HCG UR QL: NEGATIVE — SIGNIFICANT CHANGE UP

## 2025-06-02 PROCEDURE — 88305 TISSUE EXAM BY PATHOLOGIST: CPT | Mod: 26

## 2025-06-02 PROCEDURE — 43239 EGD BIOPSY SINGLE/MULTIPLE: CPT

## 2025-06-02 NOTE — PRE PROCEDURE NOTE - PRE PROCEDURE EVALUATION
Attending Physician: Steven Dietz MD                           Procedure: Esophagogastroduodenoscopy    Indication for Procedure: Gastroesophageal reflux disease (GERD), Globus sensation  ________________________________________________________  PAST MEDICAL & SURGICAL HISTORY:  Carcinoma in situ of cervix  Pregnancy  Willis teeth extracted  H/O  section  S/P LEEP of cervix    ALLERGIES:  No Known Allergies    HOME MEDICATIONS:  acetaminophen 325 mg oral tablet: 3 tab(s) orally every 6 hours as needed for  mild pain  ibuprofen 600 mg oral tablet: 1 tab(s) orally every 6 hours as needed for  moderate pain  pantoprazole 40 mg oral delayed release tablet: 1 tab(s) orally once a day before breakfast  PNV Prenatal oral tablet: 1 tab(s) orally      PHYSICAL EXAMINATION:    Height (cm): 177.8  Weight (kg): 131.5  BMI (kg/m2): 41.6  BSA (m2): 2.44T(C): 36.5  HR: 90  BP: 128/84  RR: 14  SpO2: 97%    Constitutional: In no acute distress, alert and oriented x3  HEENT: Normocephalic/atraumatic, anicteric sclera  Neck:  No JVD  Respiratory: bilateral air entry, normal respiratory effort  Cardiovascular: normal rate  Gastrointestinal: +BS, soft, non-tender, non-distended  Extremities: No peripheral edema  Neurological: alert and oriented x3, no focal deficits  Psychiatric: Normal mood, normal affect    ASA Class: II    COMMENTS:    The patient is a suitable candidate for the planned procedure unless box checked [ ]  No, explain:

## 2025-06-02 NOTE — ASU DISCHARGE PLAN (ADULT/PEDIATRIC) - FINANCIAL ASSISTANCE
[facility] provides services at a reduced cost to those who are determined to be eligible through [facility]’s financial assistance program. Information regarding [facility]’s financial assistance program can be found by going to [email or web address] or by calling [phone].

## 2025-06-02 NOTE — ASU PREOP CHECKLIST - RESPIRATORY RATE (BREATHS/MIN)
-- DO NOT REPLY / DO NOT REPLY ALL --  -- Message is from [facility] [employer] (ECO) --    Order Request  Lab: bloodwork    Message / reason: patient requesting blood work, due to low back pain    Insurance type: see below  Payor: [facility] MEDICAID / Plan: [facility] MEDICAID HMO / Product Type: T19 HMO    Preferred Delivery Method   Call when ready for pickup - phone number to notify: ***     Caller Information       Type Contact Phone/Fax    09/02/2022 10:15 AM CDT Phone (Incoming) Jj Rodney (Self)           Alternative phone number: [phone]  Can a detailed message be left? Yes    Message Turnaround:     [place]:    Please give this turnaround time to the caller:   \"This message will be sent to [state Provider's name]. The clinical team will fulfill your request as soon as they review your message.\"-- DO NOT REPLY / DO NOT REPLY ALL --  -- Message is from [facility] [employer] ([facility]O) --    Order Request  {Type of order being requested:997281}    Message / reason: ***    Insurance type: ***  Payor: [facility] MEDICAID / Plan: [facility] MEDICAID HMO / Product Type: T19 HMO    Preferred Delivery Method   {Method:267438}         Alternative phone number: ***    Can a detailed message be left? {Yes=1 or No:[id number]::\"No\"}    Message Turnaround: {Is the call about an [place], [facility] or [facility] practice site?:[id number]}   Speaking Coherently 14

## 2025-06-02 NOTE — ASU DISCHARGE PLAN (ADULT/PEDIATRIC) - NS MD DC FALL RISK RISK
For information on Fall & Injury Prevention, visit: [email or web address] OR  [email or web address] OR  https://www.cdc.gov/steadi/patient.html

## 2025-06-03 ENCOUNTER — OUTPATIENT (OUTPATIENT)
Dept: OUTPATIENT SERVICES | Facility: HOSPITAL | Age: 35
LOS: 1 days | End: 2025-06-03
Payer: MEDICAID

## 2025-06-03 ENCOUNTER — APPOINTMENT (OUTPATIENT)
Dept: ULTRASOUND IMAGING | Facility: CLINIC | Age: 35
End: 2025-06-03
Payer: MEDICAID

## 2025-06-03 DIAGNOSIS — K08.409 PARTIAL LOSS OF TEETH, UNSPECIFIED CAUSE, UNSPECIFIED CLASS: Chronic | ICD-10-CM

## 2025-06-03 DIAGNOSIS — R09.A2 FOREIGN BODY SENSATION, THROAT: ICD-10-CM

## 2025-06-03 DIAGNOSIS — Z98.891 HISTORY OF UTERINE SCAR FROM PREVIOUS SURGERY: Chronic | ICD-10-CM

## 2025-06-03 DIAGNOSIS — Z98.890 OTHER SPECIFIED POSTPROCEDURAL STATES: Chronic | ICD-10-CM

## 2025-06-03 PROCEDURE — 76536 US EXAM OF HEAD AND NECK: CPT | Mod: 26

## 2025-06-03 PROCEDURE — 76536 US EXAM OF HEAD AND NECK: CPT

## 2025-06-04 LAB — SURGICAL PATHOLOGY STUDY: SIGNIFICANT CHANGE UP

## 2025-06-17 ENCOUNTER — TRANSCRIPTION ENCOUNTER (OUTPATIENT)
Age: 35
End: 2025-06-17

## 2025-06-19 NOTE — ED ADULT NURSE NOTE - CAS DISCH CONDITION
The CT scan did not show any signs of acute infection.  The ultrasound did not show obvious abnormalities either.  Unfortunately, the ultrasound was not able to fully visualize your right ovary but your pain seems to be most consistent with gas pain.  Please monitor your symptoms carefully at home.  Please take simethicone, ibuprofen, Tylenol for your pain.  If your symptoms worsen or you develop fever please return to the emergency department otherwise please follow-up with primary care doctor.  
Stable

## 2025-06-30 ENCOUNTER — APPOINTMENT (OUTPATIENT)
Dept: INTERNAL MEDICINE | Facility: CLINIC | Age: 35
End: 2025-06-30
Payer: MEDICAID

## 2025-06-30 VITALS
SYSTOLIC BLOOD PRESSURE: 124 MMHG | HEART RATE: 74 BPM | WEIGHT: 293 LBS | BODY MASS INDEX: 43.4 KG/M2 | OXYGEN SATURATION: 98 % | DIASTOLIC BLOOD PRESSURE: 85 MMHG | TEMPERATURE: 97.9 F | HEIGHT: 69 IN

## 2025-06-30 PROCEDURE — G2211 COMPLEX E/M VISIT ADD ON: CPT | Mod: NC

## 2025-06-30 PROCEDURE — 99214 OFFICE O/P EST MOD 30 MIN: CPT

## 2025-07-15 ENCOUNTER — APPOINTMENT (OUTPATIENT)
Dept: RADIOLOGY | Facility: HOSPITAL | Age: 35
End: 2025-07-15

## 2025-07-27 NOTE — OB PROVIDER TRIAGE NOTE - NS_FHRVARIABILITY_OBGYN_ALL_OB
Patient with c/o rectal bleeding with bowel movements. Patient describes bright red blood in toilet. Feels well otherwise, denies abdominal pain or rectal pain. No changes in stool denies urinary symptoms   
Moderate Variability

## 2025-07-29 ENCOUNTER — APPOINTMENT (OUTPATIENT)
Dept: OBGYN | Facility: CLINIC | Age: 35
End: 2025-07-29
Payer: MEDICAID

## 2025-07-29 VITALS
WEIGHT: 293 LBS | SYSTOLIC BLOOD PRESSURE: 124 MMHG | DIASTOLIC BLOOD PRESSURE: 72 MMHG | HEIGHT: 69 IN | BODY MASS INDEX: 43.4 KG/M2

## 2025-07-29 DIAGNOSIS — Z87.42 PERSONAL HISTORY OF OTHER DISEASES OF THE FEMALE GENITAL TRACT: ICD-10-CM

## 2025-07-29 DIAGNOSIS — D64.9 ANEMIA, UNSPECIFIED: ICD-10-CM

## 2025-07-29 DIAGNOSIS — N92.0 EXCESSIVE AND FREQUENT MENSTRUATION WITH REGULAR CYCLE: ICD-10-CM

## 2025-07-29 PROCEDURE — 99213 OFFICE O/P EST LOW 20 MIN: CPT

## 2025-07-29 RX ORDER — ETONOGESTREL AND ETHINYL ESTRADIOL 11.7; 2.7 MG/1; MG/1
0.12-0.015 INSERT, EXTENDED RELEASE VAGINAL
Qty: 3 | Refills: 1 | Status: ACTIVE | COMMUNITY
Start: 2025-07-29 | End: 1900-01-01

## 2025-07-30 ENCOUNTER — APPOINTMENT (OUTPATIENT)
Dept: OTOLARYNGOLOGY | Facility: CLINIC | Age: 35
End: 2025-07-30
Payer: MEDICAID

## 2025-07-30 ENCOUNTER — TRANSCRIPTION ENCOUNTER (OUTPATIENT)
Age: 35
End: 2025-07-30

## 2025-07-30 ENCOUNTER — NON-APPOINTMENT (OUTPATIENT)
Age: 35
End: 2025-07-30

## 2025-07-30 VITALS
SYSTOLIC BLOOD PRESSURE: 119 MMHG | WEIGHT: 293 LBS | HEART RATE: 87 BPM | HEIGHT: 69 IN | BODY MASS INDEX: 43.4 KG/M2 | DIASTOLIC BLOOD PRESSURE: 76 MMHG

## 2025-07-30 DIAGNOSIS — R05.9 COUGH, UNSPECIFIED: ICD-10-CM

## 2025-07-30 DIAGNOSIS — R09.89 OTHER SPECIFIED SYMPTOMS AND SIGNS INVOLVING THE CIRCULATORY AND RESPIRATORY SYSTEMS: ICD-10-CM

## 2025-07-30 DIAGNOSIS — J34.2 DEVIATED NASAL SEPTUM: ICD-10-CM

## 2025-07-30 DIAGNOSIS — R09.82 POSTNASAL DRIP: ICD-10-CM

## 2025-07-30 PROCEDURE — 99203 OFFICE O/P NEW LOW 30 MIN: CPT | Mod: 25

## 2025-07-30 PROCEDURE — 31231 NASAL ENDOSCOPY DX: CPT

## 2025-07-30 RX ORDER — AZELASTINE HYDROCHLORIDE 137 UG/1
0.1 SPRAY, METERED NASAL
Qty: 1 | Refills: 3 | Status: ACTIVE | COMMUNITY
Start: 2025-07-30 | End: 1900-01-01

## 2025-07-30 RX ORDER — FLUTICASONE PROPIONATE 50 UG/1
50 SPRAY NASAL DAILY
Qty: 1 | Refills: 2 | Status: ACTIVE | COMMUNITY
Start: 2025-07-30 | End: 1900-01-01

## 2025-08-05 ENCOUNTER — APPOINTMENT (OUTPATIENT)
Dept: INTERNAL MEDICINE | Facility: CLINIC | Age: 35
End: 2025-08-05
Payer: MEDICAID

## 2025-08-05 VITALS
WEIGHT: 293 LBS | OXYGEN SATURATION: 100 % | SYSTOLIC BLOOD PRESSURE: 114 MMHG | BODY MASS INDEX: 43.4 KG/M2 | HEIGHT: 69 IN | HEART RATE: 69 BPM | DIASTOLIC BLOOD PRESSURE: 81 MMHG | TEMPERATURE: 97.6 F

## 2025-08-05 DIAGNOSIS — K21.9 GASTRO-ESOPHAGEAL REFLUX DISEASE W/OUT ESOPHAGITIS: ICD-10-CM

## 2025-08-05 DIAGNOSIS — R06.83 SNORING: ICD-10-CM

## 2025-08-05 DIAGNOSIS — R21 RASH AND OTHER NONSPECIFIC SKIN ERUPTION: ICD-10-CM

## 2025-08-05 DIAGNOSIS — D50.9 IRON DEFICIENCY ANEMIA, UNSPECIFIED: ICD-10-CM

## 2025-08-05 PROCEDURE — 99214 OFFICE O/P EST MOD 30 MIN: CPT

## 2025-08-05 PROCEDURE — G2211 COMPLEX E/M VISIT ADD ON: CPT | Mod: NC

## 2025-08-05 RX ORDER — TRIAMCINOLONE ACETONIDE 1 MG/ML
0.1 LOTION TOPICAL
Qty: 1 | Refills: 0 | Status: ACTIVE | COMMUNITY
Start: 2025-08-05 | End: 1900-01-01

## 2025-08-19 ENCOUNTER — APPOINTMENT (OUTPATIENT)
Dept: PULMONOLOGY | Facility: CLINIC | Age: 35
End: 2025-08-19
Payer: MEDICAID

## 2025-08-19 VITALS — HEART RATE: 82 BPM | OXYGEN SATURATION: 97 % | SYSTOLIC BLOOD PRESSURE: 127 MMHG | DIASTOLIC BLOOD PRESSURE: 83 MMHG

## 2025-08-19 DIAGNOSIS — R06.83 SNORING: ICD-10-CM

## 2025-08-19 PROCEDURE — G2211 COMPLEX E/M VISIT ADD ON: CPT | Mod: NC

## 2025-08-19 PROCEDURE — 99203 OFFICE O/P NEW LOW 30 MIN: CPT

## 2025-08-20 ENCOUNTER — APPOINTMENT (OUTPATIENT)
Dept: PULMONOLOGY | Facility: CLINIC | Age: 35
End: 2025-08-20
Payer: MEDICAID

## 2025-08-20 PROCEDURE — 95800 SLP STDY UNATTENDED: CPT

## 2025-08-21 PROCEDURE — 95800 SLP STDY UNATTENDED: CPT

## 2025-09-04 ENCOUNTER — APPOINTMENT (OUTPATIENT)
Dept: PULMONOLOGY | Facility: CLINIC | Age: 35
End: 2025-09-04
Payer: MEDICAID

## 2025-09-04 DIAGNOSIS — R06.83 SNORING: ICD-10-CM

## 2025-09-04 PROCEDURE — G2211 COMPLEX E/M VISIT ADD ON: CPT | Mod: NC,95

## 2025-09-04 PROCEDURE — 99213 OFFICE O/P EST LOW 20 MIN: CPT | Mod: 95

## 2025-09-10 ENCOUNTER — APPOINTMENT (OUTPATIENT)
Dept: OTOLARYNGOLOGY | Facility: CLINIC | Age: 35
End: 2025-09-10
Payer: MEDICAID

## 2025-09-10 VITALS — SYSTOLIC BLOOD PRESSURE: 112 MMHG | HEART RATE: 73 BPM | TEMPERATURE: 98 F | DIASTOLIC BLOOD PRESSURE: 73 MMHG

## 2025-09-10 DIAGNOSIS — R09.A2 FOREIGN BODY SENSATION, THROAT: ICD-10-CM

## 2025-09-10 DIAGNOSIS — J34.2 DEVIATED NASAL SEPTUM: ICD-10-CM

## 2025-09-10 DIAGNOSIS — R05.9 COUGH, UNSPECIFIED: ICD-10-CM

## 2025-09-10 PROCEDURE — 99213 OFFICE O/P EST LOW 20 MIN: CPT | Mod: 25

## 2025-09-10 PROCEDURE — 31231 NASAL ENDOSCOPY DX: CPT

## (undated) DEVICE — POSITIONER STRAP ARMBOARD VELCRO TS-30

## (undated) DEVICE — BASIN SET DOUBLE

## (undated) DEVICE — PACK IV START WITH CHG

## (undated) DEVICE — DRAPE TOWEL BLUE 17" X 24"

## (undated) DEVICE — BITE BLOCK ADULT 20 X 27MM (GREEN)

## (undated) DEVICE — PREP BETADINE SPONGE STICKS

## (undated) DEVICE — TUBING SUCTION NONCONDUCTIVE 6MM X 12FT

## (undated) DEVICE — BASIN SET SINGLE

## (undated) DEVICE — PROTECTOR HEEL / ELBOW FLUFFY

## (undated) DEVICE — GLV 8 PROTEXIS (CREAM) MICRO

## (undated) DEVICE — UNDERPAD LINEN SAVER 17 X 24"

## (undated) DEVICE — GOWN LG

## (undated) DEVICE — CATH IV SAFE BC 22G X 1" (BLUE)

## (undated) DEVICE — DRSG 2X2

## (undated) DEVICE — CONNECTOR 5 IN1 SUCTION TUBING

## (undated) DEVICE — LABELS BLANK W PEN

## (undated) DEVICE — MARKING PEN W RULER

## (undated) DEVICE — NDL SPINAL 22G X 3.5" (BLACK)

## (undated) DEVICE — VISITEC 4X4

## (undated) DEVICE — SYR LUER LOK 10CC

## (undated) DEVICE — CONTAINER FORMALIN 80ML YELLOW

## (undated) DEVICE — PACK PERI GYN

## (undated) DEVICE — BIOPSY FORCEP COLD DISP

## (undated) DEVICE — TUBING MEDI-VAC W MAXIGRIP CONNECTORS 1/4"X6'

## (undated) DEVICE — GOWN XXL

## (undated) DEVICE — CLAMP BX HOT RAD JAW 3

## (undated) DEVICE — ELCTR ECG CONDUCTIVE ADHESIVE

## (undated) DEVICE — APPLICATOR Q TIP 6" WOOD STEM

## (undated) DEVICE — LUBRICATING JELLY HR ONE SHOT 3G

## (undated) DEVICE — TUBING IV SET GRAVITY 3Y 100" MACRO

## (undated) DEVICE — DRSG CURITY GAUZE SPONGE 4 X 4" 12-PLY NON-STERILE

## (undated) DEVICE — BASIN EMESIS 10IN GRADUATED MAUVE

## (undated) DEVICE — Device

## (undated) DEVICE — SALIVA EJECTOR (BLUE)

## (undated) DEVICE — NDL SPINAL 25G X 3.5" (BLUE)

## (undated) DEVICE — DENTURE CUP PINK

## (undated) DEVICE — SUT SILK 2-0 30" SH

## (undated) DEVICE — DRSG BANDAID 0.75X3"

## (undated) DEVICE — ELCTR BALL LLETZ LG 5MM

## (undated) DEVICE — BIOPSY FORCEP RADIAL JAW 4 STANDARD WITH NEEDLE

## (undated) DEVICE — VENODYNE/SCD SLEEVE CALF MEDIUM

## (undated) DEVICE — DRSG PAD SANITARY OB

## (undated) DEVICE — ELCTR BOVIE PENCIL BLADE 10FT